# Patient Record
Sex: FEMALE | Race: WHITE | NOT HISPANIC OR LATINO | Employment: FULL TIME | URBAN - METROPOLITAN AREA
[De-identification: names, ages, dates, MRNs, and addresses within clinical notes are randomized per-mention and may not be internally consistent; named-entity substitution may affect disease eponyms.]

---

## 2017-05-12 ENCOUNTER — OFFICE VISIT (OUTPATIENT)
Dept: FAMILY MEDICINE | Facility: CLINIC | Age: 22
End: 2017-05-12
Payer: COMMERCIAL

## 2017-05-12 VITALS
SYSTOLIC BLOOD PRESSURE: 131 MMHG | DIASTOLIC BLOOD PRESSURE: 83 MMHG | HEIGHT: 67 IN | WEIGHT: 175.2 LBS | HEART RATE: 81 BPM | OXYGEN SATURATION: 100 % | TEMPERATURE: 97.9 F | BODY MASS INDEX: 27.5 KG/M2

## 2017-05-12 DIAGNOSIS — Z23 ENCOUNTER FOR IMMUNIZATION: ICD-10-CM

## 2017-05-12 DIAGNOSIS — Z30.011 ENCOUNTER FOR INITIAL PRESCRIPTION OF CONTRACEPTIVE PILLS: Primary | ICD-10-CM

## 2017-05-12 DIAGNOSIS — Z11.3 SCREEN FOR STD (SEXUALLY TRANSMITTED DISEASE): ICD-10-CM

## 2017-05-12 PROCEDURE — 87591 N.GONORRHOEAE DNA AMP PROB: CPT | Performed by: FAMILY MEDICINE

## 2017-05-12 PROCEDURE — 87491 CHLMYD TRACH DNA AMP PROBE: CPT | Performed by: FAMILY MEDICINE

## 2017-05-12 PROCEDURE — 90471 IMMUNIZATION ADMIN: CPT | Performed by: FAMILY MEDICINE

## 2017-05-12 PROCEDURE — 90649 4VHPV VACCINE 3 DOSE IM: CPT | Performed by: FAMILY MEDICINE

## 2017-05-12 PROCEDURE — 99203 OFFICE O/P NEW LOW 30 MIN: CPT | Mod: 25 | Performed by: FAMILY MEDICINE

## 2017-05-12 RX ORDER — LEVONORGESTREL/ETHIN.ESTRADIOL 0.1-0.02MG
1 TABLET ORAL DAILY
Qty: 84 TABLET | Refills: 4 | Status: SHIPPED | OUTPATIENT
Start: 2017-05-12 | End: 2018-04-23

## 2017-05-12 NOTE — MR AVS SNAPSHOT
After Visit Summary   5/12/2017    Blanche Lyman    MRN: [id number]           Patient Information     Date Of Birth          1995        Visit Information        Provider Department      5/12/2017 7:00 AM Wilder Whitfield MD [facility]        Today's Diagnoses     Encounter for initial prescription of contraceptive pills    -  1      Care Instructions    ORAL CONTRACEPTIVE START INSTRUCTION  If the first day of period is day 1 then start the birth control on day 5-7     Take pill by mouth daily at same time every day  Expect the period during blank Pill section  Start next packet on time, if late start use condom in addition that month  - should use  back up the pill with a condom during the first cycle.  - You  need  additional protection, such as a condom, to prevent exposure to sexually transmitted diseases  and HIV     MISSED PILL INSTRUCTION ( expect breakthrough bleeding)   - If miss one pill take it when you remember, if its the next day take 2 at once.   - If you miss 2 pills in a row - take 2 pills for the next 2 days and use a back up condom until the next  cycle    SIDE EFFECTS  - bleeding in between periods is normal for first 2 months - should decrease by the third month  - blood clots in the leg that can travel to heart, lungs or brain are rare.    smoking increases that risk.   - redness, heat, swelling in the legs may represent a blood clot so call immediately  - Hormones can cause mood changes, headaches, and stomach upset.  These usually go down after a few months but if they don't we can change the pill to a different hormone pill.    MEDICATION INTERACTION  - some medication can interfere with oral contraceptive pills such as antibiotics. Use condoms during the month on antibiotics or other prescription medication that may decrease oral contraceptive pill  effectiveness          Thank you for choosing [facility] ([facility]  You may be receiving a survey in the  "mail from Enriqueta Ocampo regarding your visit today.  Please take a few minutes to complete and return the survey to let us know how we are doing.      If you have questions or concerns, please contact us via Jeeran or you can contact your care team at [phone].    Our Clinic hours are:  Monday 6:40 am  to 7:00 pm  Tuesday -Friday 6:40 am to 5:00 pm    The [place] outpatient lab hours are:  Monday - Friday 6:10 am to 4:45 pm  Saturdays 7:00 am to 11:00 am  Appointments are required, call [phone]    If you have clinical questions after hours or would like to schedule an appointment,  call the clinic at [phone].        Follow-ups after your visit        Who to contact     If you have questions or need follow up information about today's clinic visit or your schedule please contact [facility] directly at [phone].  Normal or non-critical lab and imaging results will be communicated to you by CipherHealthhart, letter or phone within 4 business days after the clinic has received the results. If you do not hear from us within 7 days, please contact the clinic through Jeeran or phone. If you have a critical or abnormal lab result, we will notify you by phone as soon as possible.  Submit refill requests through Jeeran or call your pharmacy and they will forward the refill request to us. Please allow 3 business days for your refill to be completed.          Additional Information About Your Visit        Jeeran Information     Jeeran lets you send messages to your doctor, view your test results, renew your prescriptions, schedule appointments and more. To sign up, go to [email or web address] . Click on \"Log in\" on the left side of the screen, which will take you to the Welcome page. Then click on \"Sign up Now\" on the right side of the page.     You will be asked to enter the access code listed below, as well as some personal information. Please follow the directions to create your username and " "password.     Your access code is: [email or web address]  Expires: 8/10/2017  7:29 AM     Your access code will  in 90 days. If you need help or a new code, please call your [facility] or [phone].        Care EveryWhere ID     This is your Care EveryWhere ID. This could be used by other organizations to access your [facility] medical records  [id number]        Your Vitals Were     Pulse Temperature Height Last Period Pulse Oximetry BMI (Body Mass Index)    81 97.9  F (36.6  C) (Tympanic) 5' 6.5\" (1.689 m) 2017 (Exact Date) 100% 27.85 kg/m2       Blood Pressure from Last 3 Encounters:   17 131/83   11 143/77   11 135/84    Weight from Last 3 Encounters:   17 175 lb 3.2 oz (79.5 kg)   11 158 lb 12.8 oz (72 kg) (92 %)*   11 154 lb 6.4 oz (70 kg) (90 %)*     * Growth percentiles are based on [facility] 2-20 Years data.              Today, you had the following     No orders found for display         Today's Medication Changes          These changes are accurate as of: 17  7:29 AM.  If you have any questions, ask your nurse or doctor.               Start taking these medicines.        Dose/Directions    levonorgestrel-ethinyl estradiol 0.1-20 MG-MCG per tablet   Commonly known as:  AVIANE,DEIDRE,KIMBERLY   Used for:  Encounter for initial prescription of contraceptive pills   Started by:  Wilder Whitfield MD        Dose:  1 tablet   Take 1 tablet by mouth daily   Quantity:  84 tablet   Refills:  4            Where to get your medicines      These [facility] - [address]  [address], [place] [address]    Hours:  [place] [place] [facility] Phone:  [phone]     levonorgestrel-ethinyl estradiol 0.1-20 MG-MCG per tablet                Primary Care Provider Office Phone # Fax #    Mar Gretta Salcedo [phone]       [facility] [address] [facility] [address]        Thank you!     Thank you for choosing [facility]  for your care. Our goal is always to provide you with excellent care. Hearing back from our patients is one way we can continue to improve our services. Please take a few minutes to complete the written survey that you may receive in the mail after your visit with us. Thank you!             Your Updated Medication List - Protect others around you: Learn how to safely use, store and throw away your medicines at www.disposemymeds.org.          This list is accurate as of: 5/12/17  7:29 AM.  Always use your most recent med list.                   Brand Name Dispense Instructions for use    levonorgestrel-ethinyl estradiol 0.1-20 MG-MCG per tablet    AVIANE,ALESSE,LESSINA    84 tablet    Take 1 tablet by mouth daily       NO ACTIVE MEDICATIONS

## 2017-05-12 NOTE — NURSING NOTE
"Chief Complaint   Patient presents with     Contraception     wants to discuss options for birth control       Initial /83  Pulse 81  Temp 97.9  F (36.6  C) (Tympanic)  Ht 5' 6.5\" (1.689 m)  Wt 175 lb 3.2 oz (79.5 kg)  LMP 04/20/2017 (Exact Date)  SpO2 100%  BMI 27.85 kg/m2 Estimated body mass index is 27.85 kg/(m^2) as calculated from the following:    Height as of this encounter: 5' 6.5\" (1.689 m).    Weight as of this encounter: 175 lb 3.2 oz (79.5 kg).  Medication Reconciliation: complete    Screening Questionnaire for Adult Immunization    Are you sick today?   No   Do you have allergies to medications, food, a vaccine component or latex?   No   Have you ever had a serious reaction after receiving a vaccination?   No   Do you have a long-term health problem with heart disease, lung disease, asthma, kidney disease, metabolic disease (e.g. diabetes), anemia, or other blood disorder?   No   Do you have cancer, leukemia, HIV/AIDS, or any other immune system problem?   No   In the past 3 months, have you taken medications that affect  your immune system, such as prednisone, other steroids, or anticancer drugs; drugs for the treatment of rheumatoid arthritis, Crohn s disease, or psoriasis; or have you had radiation treatments?   No   Have you had a seizure, or a brain or other nervous system problem?   No   During the past year, have you received a transfusion of blood or blood     products, or been given immune (gamma) globulin or antiviral drug?   No   For women: Are you pregnant or is there a chance you could become        pregnant during the next month?   No   Have you received any vaccinations in the past 4 weeks?   No     Immunization questionnaire answers were all negative.      MNVFC doesn't apply on this patient    Per orders of Dr. Wilder Whitfield, injection of Gardasil given by Angela Milner. Patient instructed to remain in clinic for 20 minutes afterwards, and to report any adverse reaction to me " immediately.       Screening performed by Angela Milner on 5/12/2017 at 7:08 AM.

## 2017-05-12 NOTE — PATIENT INSTRUCTIONS
ORAL CONTRACEPTIVE START INSTRUCTION  If the first day of period is day 1 then start the birth control on day 5-7     Take pill by mouth daily at same time every day  Expect the period during blank Pill section  Start next packet on time, if late start use condom in addition that month  - should use  back up the pill with a condom during the first cycle.  - You  need  additional protection, such as a condom, to prevent exposure to sexually transmitted diseases  and HIV     MISSED PILL INSTRUCTION ( expect breakthrough bleeding)   - If miss one pill take it when you remember, if its the next day take 2 at once.   - If you miss 2 pills in a row - take 2 pills for the next 2 days and use a back up condom until the next  cycle    SIDE EFFECTS  - bleeding in between periods is normal for first 2 months - should decrease by the third month  - blood clots in the leg that can travel to heart, lungs or brain are rare.    smoking increases that risk.   - redness, heat, swelling in the legs may represent a blood clot so call immediately  - Hormones can cause mood changes, headaches, and stomach upset.  These usually go down after a few months but if they don't we can change the pill to a different hormone pill.    MEDICATION INTERACTION  - some medication can interfere with oral contraceptive pills such as antibiotics. Use condoms during the month on antibiotics or other prescription medication that may decrease oral contraceptive pill  effectiveness          Thank you for choosing [facility].  You may be receiving a survey in the mail from Enriqueta Ocampo regarding your visit today.  Please take a few minutes to complete and return the survey to let us know how we are doing.      If you have questions or concerns, please contact us via Joroto or you can contact your care team at [phone].    Our Clinic hours are:  Monday 6:40 am  to 7:00 pm  Tuesday -Friday 6:40 am to 5:00 pm    The [place] outpatient lab hours  are:  Monday - Friday 6:10 am to 4:45 pm  Saturdays 7:00 am to 11:00 am  Appointments are required, call [phone]    If you have clinical questions after hours or would like to schedule an appointment,  call the clinic at [phone].

## 2017-05-12 NOTE — PROGRESS NOTES
"  SUBJECTIVE:                                                    Blanche Lyman is a 22 year old female who presents to clinic today for the following health issues:      Chief Complaint   Patient presents with     Contraception     wants to discuss options for birth control     Blanche Lyman is a 22 year old  female   Here for contraception.  Periods are regular occuring every 28 days, takes IBP for cramping pain  Last Menstrual Period Patient's last menstrual period was 04/20/2017 (exact date).  Is sexually active, with male partner.  Has used Plan B in the past.  Denies hx of migraine, seizure, liver disease, high blood pressure or blood clots. No aura with migraine.  Non Smoker.    Denies fam hx of cancers or blood clots.         Review of Systems:   RESP: no significant cough, no shortness of breath  CV: no chest pain, no palpitations, no new or worsening peripheral edema  GI: no nausea, no vomiting, no constipation, no diarrhea  : no frequency, no dysuria, no hematuria, no vaginal discharge or itching  CONSTITUTIONAL: no fever, no chills,   SKIN: no worrisome rashes, no worrisome lesions          Physical Exam:     Vitals:    05/12/17 0704   BP: 131/83   Pulse: 81   Temp: 97.9  F (36.6  C)   TempSrc: Tympanic   SpO2: 100%   Weight: 175 lb 3.2 oz (79.5 kg)   Height: 5' 6.5\" (1.689 m)     Body mass index is 27.85 kg/(m^2).  GENERAL:: healthy, alert and no distress  PSYCH: Alert and oriented times 3; speech- coherent , normal rate and volume; able to articulate logical thoughts, able to abstract reason, no tangential thoughts, no hallucinations or delusions, affect- normal  No results found for this or any previous visit (from the past 24 hour(s)).  Assessment and Plan   Blanche was seen today for contraception and imm/inj.    Diagnoses and all orders for this visit:    Encounter for initial prescription of contraceptive pills  --discussed risks, benefits, and side effects of this new medication. Patient " understands and is in agreement.    -     levonorgestrel-ethinyl estradiol (AVIANE,DEIDRE,LESSINA) 0.1-20 MG-MCG per tablet; Take 1 tablet by mouth daily    Screen for STD (sexually transmitted disease)  -     Chlamydia trachomatis PCR  -     Neisseria gonorrhoeae PCR    Encounter for immunization  -     HUMAN PAPILLOMA VIRUS VACCINE  -     ADMIN 1st VACCINE      Patient interested in birth control.  Reviewed risks, benefits, of choices including abstinence, OCP, Patch, Nuvaring, Depo-Provera, IUD, and condoms.  Reviewed need for back-up contraception for the first month of hormonal methods. Reviewed that only abstinence and condoms provide protection from STD's.    Options for treatment and follow-up care were reviewed with the patient and/or guardian. Blanche Lyman and/or guardian engaged in the decision making process and verbalized understanding of the options discussed and agreed with the final plan.  Return to clinic for physical for PAP, offered today and patient declined.  Wilder Whitfield MD  [facility]

## 2017-05-14 LAB
C TRACH DNA SPEC QL NAA+PROBE: NORMAL
N GONORRHOEA DNA SPEC QL NAA+PROBE: NORMAL
SPECIMEN SOURCE: NORMAL
SPECIMEN SOURCE: NORMAL

## 2017-10-29 ENCOUNTER — HEALTH MAINTENANCE LETTER (OUTPATIENT)
Age: 22
End: 2017-10-29

## 2018-03-04 ENCOUNTER — HEALTH MAINTENANCE LETTER (OUTPATIENT)
Age: 23
End: 2018-03-04

## 2018-04-23 DIAGNOSIS — Z30.011 ENCOUNTER FOR INITIAL PRESCRIPTION OF CONTRACEPTIVE PILLS: ICD-10-CM

## 2018-04-23 NOTE — TELEPHONE ENCOUNTER
"Requested Prescriptions   Pending Prescriptions Disp Refills     levonorgestrel-ethinyl estradiol (AVIANE,DEIDRE,LESSINA) 0.1-20 MG-MCG per tablet  Last Written Prescription Date:  05/12/17  Last Fill Quantity: 84,  # refills: 4   Last office visit: 5/12/2017 with prescribing provider:  05/12/17   Future Office Visit:     84 tablet 4     Sig: Take 1 tablet by mouth daily    Contraceptives Protocol Passed    4/23/2018  2:37 PM       Passed - Patient is not a current smoker if age is 35 or older       Passed - Recent (12 mo) or future (30 days) visit within the authorizing provider's specialty    Patient had office visit in the last 12 months or has a visit in the next 30 days with authorizing provider or within the authorizing provider's specialty.  See \"Patient Info\" tab in inbasket, or \"Choose Columns\" in Meds & Orders section of the refill encounter.         Passed - No active pregnancy on record       Passed - No positive pregnancy test in past 12 months          "

## 2018-04-25 RX ORDER — LEVONORGESTREL/ETHIN.ESTRADIOL 0.1-0.02MG
1 TABLET ORAL DAILY
Qty: 84 TABLET | Refills: 0 | Status: SHIPPED | OUTPATIENT
Start: 2018-04-25 | End: 2018-07-06

## 2018-04-25 NOTE — TELEPHONE ENCOUNTER
Prescription approved per [facility] Refill Protocol.    Lilliana ARMENTA RN

## 2018-05-27 ENCOUNTER — APPOINTMENT (OUTPATIENT)
Dept: GENERAL RADIOLOGY | Facility: CLINIC | Age: 23
End: 2018-05-27
Attending: NURSE PRACTITIONER
Payer: COMMERCIAL

## 2018-05-27 ENCOUNTER — HOSPITAL ENCOUNTER (EMERGENCY)
Facility: CLINIC | Age: 23
Discharge: HOME | End: 2018-05-27
Attending: NURSE PRACTITIONER | Admitting: NURSE PRACTITIONER
Payer: COMMERCIAL

## 2018-05-27 VITALS
WEIGHT: 163 LBS | OXYGEN SATURATION: 100 % | HEART RATE: 84 BPM | TEMPERATURE: 98.8 F | DIASTOLIC BLOOD PRESSURE: 102 MMHG | SYSTOLIC BLOOD PRESSURE: 154 MMHG | BODY MASS INDEX: 25.91 KG/M2

## 2018-05-27 DIAGNOSIS — S60.112A CONTUSION OF LEFT THUMB WITH DAMAGE TO NAIL, INITIAL ENCOUNTER: Primary | ICD-10-CM

## 2018-05-27 PROCEDURE — 73140 X-RAY EXAM OF FINGER(S): CPT | Mod: LT

## 2018-05-27 PROCEDURE — G0463 HOSPITAL OUTPT CLINIC VISIT: HCPCS | Mod: 25 | Performed by: NURSE PRACTITIONER

## 2018-05-27 PROCEDURE — 29130 APPL FINGER SPLINT STATIC: CPT | Mod: FA | Performed by: NURSE PRACTITIONER

## 2018-05-27 PROCEDURE — 99214 OFFICE O/P EST MOD 30 MIN: CPT | Mod: Z6 | Performed by: NURSE PRACTITIONER

## 2018-05-27 ASSESSMENT — ENCOUNTER SYMPTOMS
ABDOMINAL PAIN: 0
SORE THROAT: 0
VOMITING: 0
SHORTNESS OF BREATH: 0
NAUSEA: 0
CHILLS: 0
DIFFICULTY URINATING: 0
WOUND: 1
FEVER: 0
SEIZURES: 0
CONSTIPATION: 0
EYE PAIN: 0
DIARRHEA: 0
FATIGUE: 0
COUGH: 0
DYSURIA: 0

## 2018-05-27 NOTE — ED AVS SNAPSHOT
[facility] Emergency Department    [address]    [facility] [address]    Phone:  [phone]    Fax:  [phone]                                       Blanche Lyman   MRN: [id number]    Department:  [facility] Emergency Department   Date of Visit:  5/27/2018           Patient Information     Date Of Birth          1995        Your diagnoses for this visit were:     Contusion of left thumb with damage to nail, initial encounter        You were seen by Leida Falcon APRN CNP.      Follow-up Information     Follow up with Mar Salcedo MD In 1 week.    Specialty:  Family Practice    Why:  If symptoms worsen, As needed    Contact information:    [facility]  [address] [address]  [phone]          Discharge Instructions         Finger Contusion  You have a contusion. This is also called a bruise. There is swelling and some bleeding under the skin, but no broken bones. This injury generally takes a few days to a few weeks to heal. During that time, the bruise will typically change in color from reddish, to purple-blue, to greenish-yellow, then to yellow-brown.  A finger contusion may be treated with a splint or buddy tape (taping the injured finger to the one next to it for support). Minor contusions likely will need no other treatment.  Home care    Elevate the hand to reduce pain and swelling. As much as possible, sit or lie down with the hand raised about the level of your heart. This is especially important during the first 48 hours.    Ice the finger to help reduce pain and swelling. Wrap a cold source (ice pack or ice cubes in a plastic bag) in a thin towel. Apply to the bruised finger for 20 minutes every 1 to 2 hours the first day. Continue this 3 to 4 times a day until the pain and swelling goes away.    If buddy tape was applied and it becomes wet or dirty, change it. You may replace it with paper, plastic, or cloth  tape. Before taping, put a thin strip of cotton or gauze between the fingers to absorb sweat. This will help prevent any breakdown of skin or fungal infections.     Unless another medicine was prescribed, you can take acetaminophen, ibuprofen, or naproxen to control pain. (If you have chronic liver or kidney disease or ever had a stomach ulcer or gastrointestinal bleeding, talk with your doctor before using these medicines.)  Follow up  Follow up with your healthcare provider, or as advised. Call if you are not improving within 1 to 2 weeks.  When to seek medical advice   Call your healthcare provider right away if you have any of the following:    Increased pain or swelling    Hand or arm becomes cold, blue, numb or tingly    Signs of infection: Warmth, drainage, or increased redness or pain around the bruise    Inability to move the injured finger or hand     Frequent bruising for unknown reasons  Date Last Reviewed: 5/1/2017 2000-2017 [employer]. [address] [place] [address]. All rights reserved. This information is not intended as a substitute for professional medical care. Always follow your healthcare professional's instructions.          24 Hour Appointment Hotline       To make an appointment at any [facility], call [phone] ([phone]). If you don't have a family doctor or clinic, we will help you find one. [place] clinics are conveniently located to serve the needs of you and your family.             Review of your medicines      Our records show that you are taking the medicines listed below. If these are incorrect, please call your family doctor or clinic.        Dose / Directions Last dose taken    levonorgestrel-ethinyl estradiol 0.1-20 MG-MCG per tablet   Commonly known as:  DEIDRE ESPARZA LESSINA   Dose:  1 tablet   Quantity:  84 tablet        Take 1 tablet by mouth daily   Refills:  0                Procedures and tests performed during your visit      "Fingers XR, 2-3 views, left      Orders Needing Specimen Collection     None      Pending Results     Date and Time Order Name Status Description    2018 1303 Fingers XR, 2-3 views, left Preliminary             Pending Culture Results     No orders found from 2018 to 2018.            Pending Results Instructions     If you had any lab results that were not finalized at the time of your Discharge, you can call the ED Lab Result RN at [phone]. You will be contacted by this team for any positive Lab results or changes in treatment. The nurses are available 7 days a week from 10A to 6:30P.  You can leave a message 24 hours per day and they will return your call.        Test Results From Your Hospital Stay        2018  1:26 PM      Narrative     LEFT FINGER TWO OR MORE VIEWS   2018 1:13 PM     HISTORY: Smashed thumb 4 days ago.     COMPARISON: None.        Impression     IMPRESSION: Normal.                Thank you for choosing [facility]       Thank you for choosing [facility] for your care. Our goal is always to provide you with excellent care. Hearing back from our patients is one way we can continue to improve our services. Please take a few minutes to complete the written survey that you may receive in the mail after you visit with us. Thank you!        NoiseFreehart Information     [email or web address] lets you send messages to your doctor, view your test results, renew your prescriptions, schedule appointments and more. To sign up, go to [email or web address] . Click on \"Log in\" on the left side of the screen, which will take you to the Welcome page. Then click on \"Sign up Now\" on the right side of the page.     You will be asked to enter the access code listed below, as well as some personal information. Please follow the directions to create your username and password.     Your access code is: [email or web address]  Expires: 2018  1:33 PM     Your access code will  in 90 days. If you need help or a " new code, please call your [place] clinic or [phone].        Care EveryWhere ID     This is your Care EveryWhere ID. This could be used by other organizations to access your [facility] medical records  [id number]        Equal Access to Services     YONI HARMAN : Blade colon Sostevenson, waaxda luqadaha, qaybta kaalmada santa, idris velasquez. So [facility] [phone].    ATENCIÓN: Si habla español, tiene a lobato disposición servicios gratuitos de asistencia lingüística. Llame al [phone].    We comply with applicable federal civil rights laws and [place] laws. We do not discriminate on the basis of race, color, national origin, age, disability, sex, sexual orientation, or gender identity.            After Visit Summary       This is your record. Keep this with you and show to your community pharmacist(s) and doctor(s) at your next visit.

## 2018-05-27 NOTE — DISCHARGE INSTRUCTIONS
Finger Contusion  You have a contusion. This is also called a bruise. There is swelling and some bleeding under the skin, but no broken bones. This injury generally takes a few days to a few weeks to heal. During that time, the bruise will typically change in color from reddish, to purple-blue, to greenish-yellow, then to yellow-brown.  A finger contusion may be treated with a splint or pratibha tape (taping the injured finger to the one next to it for support). Minor contusions likely will need no other treatment.  Home care    Elevate the hand to reduce pain and swelling. As much as possible, sit or lie down with the hand raised about the level of your heart. This is especially important during the first 48 hours.    Ice the finger to help reduce pain and swelling. Wrap a cold source (ice pack or ice cubes in a plastic bag) in a thin towel. Apply to the bruised finger for 20 minutes every 1 to 2 hours the first day. Continue this 3 to 4 times a day until the pain and swelling goes away.    If pratibha tape was applied and it becomes wet or dirty, change it. You may replace it with paper, plastic, or cloth tape. Before taping, put a thin strip of cotton or gauze between the fingers to absorb sweat. This will help prevent any breakdown of skin or fungal infections.     Unless another medicine was prescribed, you can take acetaminophen, ibuprofen, or naproxen to control pain. (If you have chronic liver or kidney disease or ever had a stomach ulcer or gastrointestinal bleeding, talk with your doctor before using these medicines.)  Follow up  Follow up with your healthcare provider, or as advised. Call if you are not improving within 1 to 2 weeks.  When to seek medical advice   Call your healthcare provider right away if you have any of the following:    Increased pain or swelling    Hand or arm becomes cold, blue, numb or tingly    Signs of infection: Warmth, drainage, or increased redness or pain around the  bruise    Inability to move the injured finger or hand     Frequent bruising for unknown reasons  Date Last Reviewed: 5/1/2017 2000-2017 [employer]. [address] [place] [address], [place] [address]. All rights reserved. This information is not intended as a substitute for professional medical care. Always follow your healthcare professional's instructions.

## 2018-05-27 NOTE — ED AVS SNAPSHOT
[facility] Emergency Department    [address] [address]    Phone:  [phone]    Fax:  [phone]                                       Blanche Lyman   MRN: [id number]    Department:  [facility] Emergency Department   Date of Visit:  5/27/2018           After Visit Summary Signature Page     I have received my discharge instructions, and my questions have been answered. I have discussed any challenges I see with this plan with the nurse or doctor.    ..........................................................................................................................................  Patient/Patient Representative Signature      ..........................................................................................................................................  Patient Representative Print Name and Relationship to Patient    ..................................................               ................................................  Date                                            Time    ..........................................................................................................................................  Reviewed by Signature/Title    ...................................................              ..............................................  Date                                                            Time

## 2018-06-27 ENCOUNTER — HOSPITAL ENCOUNTER (EMERGENCY)
Facility: CLINIC | Age: 23
Discharge: HOME | End: 2018-06-27
Attending: PHYSICIAN ASSISTANT | Admitting: PHYSICIAN ASSISTANT
Payer: COMMERCIAL

## 2018-06-27 VITALS
OXYGEN SATURATION: 99 % | RESPIRATION RATE: 16 BRPM | SYSTOLIC BLOOD PRESSURE: 140 MMHG | DIASTOLIC BLOOD PRESSURE: 88 MMHG | TEMPERATURE: 98.7 F

## 2018-06-27 DIAGNOSIS — M72.2 PLANTAR FASCIITIS: ICD-10-CM

## 2018-06-27 PROCEDURE — G0463 HOSPITAL OUTPT CLINIC VISIT: HCPCS

## 2018-06-27 PROCEDURE — 99213 OFFICE O/P EST LOW 20 MIN: CPT | Performed by: PHYSICIAN ASSISTANT

## 2018-06-27 NOTE — ED AVS SNAPSHOT
[facility] Emergency Department    [address] [address]    Phone:  [phone]    Fax:  [phone]                                       Blanche Lyman   MRN: [id number]    Department:  [facility] Emergency Department   Date of Visit:  6/27/2018           After Visit Summary Signature Page     I have received my discharge instructions, and my questions have been answered. I have discussed any challenges I see with this plan with the nurse or doctor.    ..........................................................................................................................................  Patient/Patient Representative Signature      ..........................................................................................................................................  Patient Representative Print Name and Relationship to Patient    ..................................................               ................................................  Date                                            Time    ..........................................................................................................................................  Reviewed by Signature/Title    ...................................................              ..............................................  Date                                                            Time

## 2018-06-27 NOTE — ED PROVIDER NOTES
History     Chief Complaint   Patient presents with     Foreign Body in Skin     sense Sunday rt foot      HPI  Blanche Lyman is a 23 year old female who presents to urgent care with concern over possible foreign body in her right foot.  Beginning Monday patient began complaining of pain in her right heel.  Pain is worse upon waking and intravaginally improved throughout the day. However has been present for the last three days.  She denies remembering stepping on anything however believes that there could have been something on the day preceding onset of symptoms.  Her boyfriend states concern that he was able to see something with a magnifying glass in the area.  She has not had any surrounding erythema.  No distal numbness or paresthesias. She has not attempted any OTC treatments.  She denies any recent changes in footwear.  No new changes in activity level or new exercise programs.      Problem List:    There are no active problems to display for this patient.       Past Medical History:    History reviewed. No pertinent past medical history.    Past Surgical History:    History reviewed. No pertinent surgical history.    Family History:    Family History   Problem Relation Age of Onset     HEART DISEASE Maternal Grandfather      Cancer Paternal Grandfather      HEART DISEASE Paternal Grandfather      Social History:  Marital Status:  Single [1]  Social History   Substance Use Topics     Smoking status: Never Smoker     Smokeless tobacco: Never Used     Alcohol use Not on file      Medications:      levonorgestrel-ethinyl estradiol (AVIANE,ALESSE,LESSINA) 0.1-20 MG-MCG per tablet     Review of Systems  CONSTITUTIONAL:NEGATIVE for fever, chills, change in weight  INTEGUMENTARY/SKIN: NEGATIVE for worrisome rashes, moles or lesions  RESP:NEGATIVE for significant cough or SOB  MUSCULOSKELETAL: POSITIVE  for right foot pain and NEGATIVE for other significant arthralgias or myalgias   NEURO: NEGATIVE for numbness  or paresthesias  Physical Exam   BP: 140/88  Heart Rate: 78  Temp: 98.7  F (37.1  C)  Resp: 16  SpO2: 99 %  Physical Exam   Constitutional: She is oriented to person, place, and time. She appears well-developed and well-nourished. No distress.   Cardiovascular:   Pulses:       Dorsalis pedis pulses are 2+ on the right side        Posterior tibial pulses are 2+ on the right side   Musculoskeletal:        Right ankle: Normal.        Right foot: There is tenderness (right heel and medial arch, pain exacerbated by dorsiflexion of foot). There is normal range of motion, no swelling, normal capillary refill, no crepitus, no deformity and no laceration.   Neurological: She is alert and oriented to person, place, and time. No sensory deficit.   Skin: Skin is warm and dry. No erythema.   No visible puncture wound in patients are of pain      ED Course     ED Course     Procedures        Critical Care time:  none          No results found for this or any previous visit (from the past 24 hour(s)).  Medications - No data to display  Assessments & Plan (with Medical Decision Making)     I have reviewed the nursing notes.    I have reviewed the findings, diagnosis, plan and need for follow up with the patient.       New Prescriptions    No medications on file     Final diagnoses:   Plantar fasciitis     23 year old female presents to  with concern over suspected foreign body after developing right heel pain without known injury.  She had stable vital signs upon arrival.  Physical exam findings as described above were classic for plantar fasciitis. There was no evidence of recent puncture wound or foreign body.  Symptomatic treatment including rest, ice, stretching discussed with patient.  follow up with PCP if no improvement in 5-7 days. Worrisome reasons to return to ER/UC sooner discussed.     Disclaimer: This note consists of symbols derived from keyboarding, dictation, and/or voice recognition software. As a result, there  may be errors in the script that have gone undetected.  Please consider this when interpreting information found in the chart.         6/27/2018   [facility] EMERGENCY DEPARTMENT     Whitney Weinberg PA-C  07/01/18 1935

## 2018-06-27 NOTE — ED AVS SNAPSHOT
[facility] Emergency Department    [address] [address]    Phone:  [phone]    Fax:  [phone]                                       Blanche Lyman   MRN: [id number]    Department:  [facility] Emergency Department   Date of Visit:  6/27/2018           Patient Information     Date Of Birth          1995        Your diagnoses for this visit were:     Plantar fasciitis        You were seen by Whitney Weinberg PA-C.      Follow-up Information     Follow up with Wilder Whitfield MD In 7 days.    Specialty:  Family Practice    Why:  if no improvement or sooner if new or worsening symptoms     Contact information:    [address] [address]  [phone]        Discharge References/Attachments     PLANTAR FASCIITIS (ENGLISH)      Your next 10 appointments already scheduled     Jul 06, 2018 11:00 AM CDT   PHYSICAL with Wilder Whitfield MD   [facility]    [address] [address] [phone]              24 Hour Appointment Hotline       To make an appointment at any [facility], call [phone] ([phone]). If you don't have a family doctor or clinic, we will help you find one. [facility] are conveniently located to serve the needs of you and your family.             Review of your medicines      Our records show that you are taking the medicines listed below. If these are incorrect, please call your family doctor or clinic.        Dose / Directions Last dose taken    levonorgestrel-ethinyl estradiol 0.1-20 MG-MCG per tablet   Commonly known as:  DEIDRE ESPARZA LESSINA   Dose:  1 tablet   Quantity:  84 tablet        Take 1 tablet by mouth daily   Refills:  0                Orders Needing Specimen Collection     None      Pending Results     No orders found from 6/25/2018 to 6/28/2018.            Pending Culture Results     No orders found from 6/25/2018 to 6/28/2018.           "  Pending Results Instructions     If you had any lab results that were not finalized at the time of your Discharge, you can call the ED Lab Result RN at [phone]. You will be contacted by this team for any positive Lab results or changes in treatment. The nurses are available 7 days a week from 10A to 6:30P.  You can leave a message 24 hours per day and they will return your call.        Test Results From Your Hospital Stay               Thank you for choosing [facility]       Thank you for choosing [facility] for your care. Our goal is always to provide you with excellent care. Hearing back from our patients is one way we can continue to improve our services. Please take a few minutes to complete the written survey that you may receive in the mail after you visit with us. Thank you!        Local [employer] Information     [employer] lets you send messages to your doctor, view your test results, renew your prescriptions, schedule appointments and more. To sign up, go to [email or web address] . Click on \"Log in\" on the left side of the screen, which will take you to the Welcome page. Then click on \"Sign up Now\" on the right side of the page.     You will be asked to enter the access code listed below, as well as some personal information. Please follow the directions to create your username and password.     Your access code is: [email or web address]  Expires: 2018  1:33 PM     Your access code will  in 90 days. If you need help or a new code, please call your [facility] clinic or [phone].        Care EveryWhere ID     This is your Care EveryWhere ID. This could be used by other organizations to access your [facility] medical records  [id number]        Equal Access to Services     [facility] : Blade Godfrey, efren carrasco, idris bai. So [facility] [phone].    ATENCIÓN: Si habla español, tiene a lobato disposición servicios gratuitos de asistencia " roberto Rodriguezriver al [phone].    We comply with applicable federal civil rights laws and [place] laws. We do not discriminate on the basis of race, color, national origin, age, disability, sex, sexual orientation, or gender identity.            After Visit Summary       This is your record. Keep this with you and show to your community pharmacist(s) and doctor(s) at your next visit.

## 2018-07-06 ENCOUNTER — OFFICE VISIT (OUTPATIENT)
Dept: FAMILY MEDICINE | Facility: CLINIC | Age: 23
End: 2018-07-06
Payer: COMMERCIAL

## 2018-07-06 VITALS
BODY MASS INDEX: 33.06 KG/M2 | SYSTOLIC BLOOD PRESSURE: 128 MMHG | WEIGHT: 210.6 LBS | TEMPERATURE: 98.4 F | HEART RATE: 73 BPM | HEIGHT: 67 IN | DIASTOLIC BLOOD PRESSURE: 78 MMHG | OXYGEN SATURATION: 100 %

## 2018-07-06 DIAGNOSIS — Z11.3 SCREEN FOR STD (SEXUALLY TRANSMITTED DISEASE): ICD-10-CM

## 2018-07-06 DIAGNOSIS — Z30.011 ENCOUNTER FOR INITIAL PRESCRIPTION OF CONTRACEPTIVE PILLS: ICD-10-CM

## 2018-07-06 DIAGNOSIS — Z01.419 WELL WOMAN EXAM WITH ROUTINE GYNECOLOGICAL EXAM: Primary | ICD-10-CM

## 2018-07-06 DIAGNOSIS — Z23 ENCOUNTER FOR IMMUNIZATION: ICD-10-CM

## 2018-07-06 PROCEDURE — 90471 IMMUNIZATION ADMIN: CPT | Performed by: FAMILY MEDICINE

## 2018-07-06 PROCEDURE — 90472 IMMUNIZATION ADMIN EACH ADD: CPT | Performed by: FAMILY MEDICINE

## 2018-07-06 PROCEDURE — 87491 CHLMYD TRACH DNA AMP PROBE: CPT | Performed by: FAMILY MEDICINE

## 2018-07-06 PROCEDURE — 90714 TD VACC NO PRESV 7 YRS+ IM: CPT | Performed by: FAMILY MEDICINE

## 2018-07-06 PROCEDURE — 90651 9VHPV VACCINE 2/3 DOSE IM: CPT | Performed by: FAMILY MEDICINE

## 2018-07-06 PROCEDURE — 99395 PREV VISIT EST AGE 18-39: CPT | Performed by: FAMILY MEDICINE

## 2018-07-06 PROCEDURE — G0145 SCR C/V CYTO,THINLAYER,RESCR: HCPCS | Performed by: FAMILY MEDICINE

## 2018-07-06 RX ORDER — LEVONORGESTREL/ETHIN.ESTRADIOL 0.1-0.02MG
1 TABLET ORAL DAILY
Qty: 84 TABLET | Refills: 4 | Status: SHIPPED | OUTPATIENT
Start: 2018-07-06 | End: 2019-08-28

## 2018-07-06 NOTE — MR AVS SNAPSHOT
After Visit Summary   7/6/2018    Blanche Lyman    MRN: [id number]           Patient Information     Date Of Birth          1995        Visit Information        Provider Department      7/6/2018 11:00 AM Wilder Whitfield MD [facility]        Today's Diagnoses     Well woman exam with routine gynecological exam    -  1    Encounter for initial prescription of contraceptive pills        Screen for STD (sexually transmitted disease)          Care Instructions    Keep up the exercise! Good for you for starting this.  One pound is 3500 calories.  So to lose one pound per week need to cut out 500 calories per day or 3500 calories per week.  Do not count exercise toward or against your calories.  Write down everything that you eat and count calories or use an nory like [employer] or MyFitness Pal or website like [employer] or [employer] or [employer]    1500 Calorie Menu: This is a well balanced healthy 1500 calorie menu so you can follow it for as long as you need!    Day 1    Breakfast - 2 whole grain toast, 1 tablespoon of jelly, 1 teaspoon of butter, 1 cup of tea or coffee,   cup of orange juice.  Snack -   bagel, 1 cup of yogurt.  Lunch - 1 oz of sliced turkey or chicken breast, 1 tossed vegetable salad with 1 tablespoon of olive oil and lemon juice, 1 whole grain roll.  Snack -   cup of fresh strawberries, 1 cup of yogurt, 1 tablespoon of granola cereal.  Dinner - 3 oz of beef, grilled or broiled, 1 cup of rice, 1 teaspoon of butter,   cup of steamed carrots, mixed green salad with olive oil and lemon juice.  Snack - 1 apple or orange.    Day 2    Breakfast - 1 orange, 1 cup of whole grain cereal, 1 cup of milk, 1 cup of strawberries.  Snack - 2 teaspoons of peanut butter, 2 rice cakes.  Lunch - 1 cup of vegetable soup, 1 oz of mozzarella cheese, 1 mixed greens salad with olive oil and lemon juice, 1 cup of yogurt, few whole grain crackers.  Snack - 1 apple.  Dinner - 5 oz of white  fish, baked, broiled or grilled, 1 baked potato, 1 cup of steamed broccoli, mixed greens salad with oil and lemon juice, 1 whole grain roll.  Snack - 3 cups of plain popcorn.    Day 3    Breakfast - 2 pancakes with 1 tablespoon of maple syrup or fruit spread.  Snack - 1 cup of milk, 1 peach.  Lunch - 6 oz of fish, grilled or broiled, mixed greens salad with 1 tablespoon of olive oil and lemon juice, 1 apple, and few whole grain crackers.  Snack - 1 granola bar.  Dinner - 2 cup of whole grain pasta,   cup of tomato sauce, mixed greens salad with olive oil and lemon juice.  Snack - 1 cup of milk, few peanuts.    Day 4    Breakfast -   cup of oatmeal, cooked with 1 teaspoons of brown sugar, 1 cup of milk, 1 orange.  Snack - 1 apple, 2 oz of almonds.  Lunch - 1 oz of sliced chicken or turkey breast, 1 teaspoon of mustard, 1 slices of whole wheat bread, 2 slices of tomato, 1   cup of sliced raw vegetables.  Snack -   cup of milk, 1 cup of strawberries.  Dinner - 3 oz skinless chicken breast, baked, broiled or grilled, 1 medium baked potato, 2 teaspoons of butter, mixed greens salad with 1 tablespoon of olive oil and lemon juice.  Snack - 1/2 cup of cottage cheese, 1 pear.    Day 5    Breakfast - 1 whole wheat bagel, 1 table spoon of cream cheese, 1 cup of orange juice.  Snack - 1 cup of yogurt, 1 apple.  Lunch - 2 oz lean hamburger, grilled or broiled, 1 cup of steamed asparagus, large tossed vegetable salad with yogurt dressing,   cup of cottage cheese.  Dinner - 2 cup of pasta with 3 oz of cooked shrimp and 1 /2 cup of broccoli, 1 slice of Italian bread, 1 teaspoon of garlic olive oil, mixed greens salad with oil and vinegar.    Day 6    Breakfast - 1 poached egg, 1 tomato, 1 whole wheat muffin,   grapefruit.  Snack - 1 cup of fruit salad, 1 cup of yogurt, 1 granola bar.  Lunch - 3 oz of sliced turkey or chicken breast, 1 yosef bread, 1 cup of sliced carrots and celery.  Snack - 1 peach,   cup of cottage cheese.  Dinner  - 3 oz of cheese, few crackers, 1 mixed greens salad with olive oil and lemon juice, 1 glass of dry red wine.  Snack - 1 cup of fresh strawberries.    Day 7    Breakfast - 1 whole wheat toast, 1 hard boiled egg,   cup of blueberries, 1 cup of yogurt.  Snack - 1 pear, 1 oz of pretzels.  Lunch - 4 oz of cheese, 1 large vegetable salad with olive oil and lemon juice, 1 cup of milk.  Snack -   cup of fruit salad, 1 granola bar.  Dinner - 3 oz of broiled or baked white fish, 1   cup of rice, 1 cup of steamed vegetables, 2 teaspoons of butter.            Thank you for choosing [facility].  You may be receiving a survey in the mail from Enriqueta Ocampo regarding your visit today.  Please take a few minutes to complete and return the survey to let us know how we are doing.      If you have questions or concerns, please contact us via [employer] or you can contact your care team at [phone].    Our Clinic hours are:  Monday 6:40 am  to 7:00 pm  Tuesday -Friday 6:40 am to 5:00 pm    The [place] outpatient lab hours are:  Monday - Friday 6:10 am to 4:45 pm  Saturdays 7:00 am to 11:00 am  Appointments are required, call [phone]    If you have clinical questions after hours or would like to schedule an appointment,  call the clinic at [phone].    Preventive Health Recommendations  Female Ages 21 to 25     Yearly exam:     See your health care provider every year in order to  o Review health changes.   o Discuss preventive care.    o Review your medicines if your doctor has prescribed any.      You should be tested each year for STDs (sexually transmitted diseases).       Talk to your provider about how often you should have cholesterol testing.      Get a Pap test every three years. If you have an abnormal result, your doctor may have you test more often.      If you are at risk for diabetes, you should have a diabetes test (fasting glucose).     Shots:     Get a flu shot each year.     Get a tetanus shot every 10  "years.     Consider getting the shot (vaccine) that prevents cervical cancer (Gardasil).    Nutrition:     Eat at least 5 servings of fruits and vegetables each day.    Eat whole-grain bread, whole-wheat pasta and brown rice instead of white grains and rice.    Get adequate Calcium and Vitamin D.     Lifestyle    Exercise at least 150 minutes a week each week (30 minutes a day, 5 days a week). This will help you control your weight and prevent disease.    Limit alcohol to one drink per day.    No smoking.     Wear sunscreen to prevent skin cancer.    See your dentist every six months for an exam and cleaning.          Follow-ups after your visit        Who to contact     If you have questions or need follow up information about today's clinic visit or your schedule please contact [facility] directly at [phone].  Normal or non-critical lab and imaging results will be communicated to you by MyChart, letter or phone within 4 business days after the clinic has received the results. If you do not hear from us within 7 days, please contact the clinic through MyChart or phone. If you have a critical or abnormal lab result, we will notify you by phone as soon as possible.  Submit refill requests through [employer] or call your pharmacy and they will forward the refill request to us. Please allow 3 business days for your refill to be completed.          Additional Information About Your Visit        Care EveryWhere ID     This is your Care EveryWhere ID. This could be used by other organizations to access your [facility] medical records  [id number]        Your Vitals Were     Pulse Temperature Height Last Period Pulse Oximetry BMI (Body Mass Index)    73 98.4  F (36.9  C) (Tympanic) 5' 6.75\" (1.695 m) 06/12/2018 (Approximate) 100% 33.23 kg/m2       Blood Pressure from Last 3 Encounters:   07/06/18 128/78   06/27/18 140/88   05/27/18 (!) 154/102    Weight from Last 3 Encounters:   07/06/18 210 lb 9.6 oz (95.5 " kg)   05/27/18 163 lb (73.9 kg)   05/12/17 175 lb 3.2 oz (79.5 kg)              We Performed the Following     Chlamydia trachomatis PCR     Pap imaged thin layer screen only - recommended age 21 - 24 years          Where to get your medicines      These medications were sent to [place] [facility] - - BHARATHI Schwab - [id number] [facility]  [id number] [place] [address], [place] [address]    Hours:  [place] Phone:  [phone]     levonorgestrel-ethinyl estradiol 0.1-20 MG-MCG per tablet          Primary Care Provider Office Phone # Fax #    Wilder Whitfield [phone] [address] [facility] [address] [facility] [address]        Equal Access to Services     YONI HARMAN : Hadal colon Sostevenson, waaxda luqadaha, qaybta kaalmada adeegyaaugusta, idris esquivel . So [facility] [phone].    ATENCIÓN: Si habla español, tiene a lobato disposición servicios gratuitos de asistencia lingüística. Kermit al [phone].    We comply with applicable federal civil rights laws and [place] laws. We do not discriminate on the basis of race, color, national origin, age, disability, sex, sexual orientation, or gender identity.            Thank you!     Thank you for choosing [facility]  for your care. Our goal is always to provide you with excellent care. Hearing back from our patients is one way we can continue to improve our services. Please take a few minutes to complete the written survey that you may receive in the mail after your visit with us. Thank you!             Your Updated Medication List - Protect others around you: Learn how to safely use, store and throw away your medicines at www.disposemymeds.org.          This list is accurate as of 7/6/18 11:48 AM.  Always use your most recent med list.                   Brand Name Dispense Instructions for use Diagnosis    levonorgestrel-ethinyl estradiol 0.1-20 MG-MCG per tablet    AVIANE,ALESSE,LESSINA     84 tablet    Take 1 tablet by mouth daily    Encounter for initial prescription of contraceptive pills

## 2018-07-06 NOTE — LETTER
July 11, 2018      Blanche Lyman  [address]   [facility] [address]    Dear ,      I am happy to inform you that your recent cervical cancer screening test (PAP smear) was normal.      Preventative screenings such as this help to ensure your health for years to come. You should repeat a pap smear in 3 years, unless otherwise directed.      You will still need to return to the clinic every year for your annual exam and other preventive tests.     Please contact the clinic at [phone] if you have further questions.       Sincerely,      Wilder Whitfield MD/lionel

## 2018-07-06 NOTE — PATIENT INSTRUCTIONS
Keep up the exercise! Good for you for starting this.  One pound is 3500 calories.  So to lose one pound per week need to cut out 500 calories per day or 3500 calories per week.  Do not count exercise toward or against your calories.  Write down everything that you eat and count calories or use an nory like [employer] or MyFitness Pal or website like [employer] or MyPlate or [employer]    1500 Calorie Menu: This is a well balanced healthy 1500 calorie menu so you can follow it for as long as you need!    Day 1    Breakfast   2 whole grain toast, 1 tablespoon of jelly, 1 teaspoon of butter, 1 cup of tea or coffee,   cup of orange juice.  Snack     bagel, 1 cup of yogurt.  Lunch   1 oz of sliced turkey or chicken breast, 1 tossed vegetable salad with 1 tablespoon of olive oil and lemon juice, 1 whole grain roll.  Snack     cup of fresh strawberries, 1 cup of yogurt, 1 tablespoon of granola cereal.  Dinner   3 oz of beef, grilled or broiled, 1 cup of rice, 1 teaspoon of butter,   cup of steamed carrots, mixed green salad with olive oil and lemon juice.  Snack   1 apple or orange.    Day 2    Breakfast   1 orange, 1 cup of whole grain cereal, 1 cup of milk, 1 cup of strawberries.  Snack   2 teaspoons of peanut butter, 2 rice cakes.  Lunch   1 cup of vegetable soup, 1 oz of mozzarella cheese, 1 mixed greens salad with olive oil and lemon juice, 1 cup of yogurt, few whole grain crackers.  Snack   1 apple.  Dinner   5 oz of white fish, baked, broiled or grilled, 1 baked potato, 1 cup of steamed broccoli, mixed greens salad with oil and lemon juice, 1 whole grain roll.  Snack   3 cups of plain popcorn.    Day 3    Breakfast   2 pancakes with 1 tablespoon of maple syrup or fruit spread.  Snack   1 cup of milk, 1 peach.  Lunch   6 oz of fish, grilled or broiled, mixed greens salad with 1 tablespoon of olive oil and lemon juice, 1 apple, and few whole grain crackers.  Snack   1 granola bar.  Dinner   2 cup of whole grain pasta,    cup of tomato sauce, mixed greens salad with olive oil and lemon juice.  Snack   1 cup of milk, few peanuts.    Day 4    Breakfast     cup of oatmeal, cooked with 1 teaspoons of brown sugar, 1 cup of milk, 1 orange.  Snack   1 apple, 2 oz of almonds.  Lunch   1 oz of sliced chicken or turkey breast, 1 teaspoon of mustard, 1 slices of whole wheat bread, 2 slices of tomato, 1   cup of sliced raw vegetables.  Snack     cup of milk, 1 cup of strawberries.  Dinner   3 oz skinless chicken breast, baked, broiled or grilled, 1 medium baked potato, 2 teaspoons of butter, mixed greens salad with 1 tablespoon of olive oil and lemon juice.  Snack   1/2 cup of cottage cheese, 1 pear.    Day 5    Breakfast   1 whole wheat bagel, 1 table spoon of cream cheese, 1 cup of orange juice.  Snack   1 cup of yogurt, 1 apple.  Lunch   2 oz lean hamburger, grilled or broiled, 1 cup of steamed asparagus, large tossed vegetable salad with yogurt dressing,   cup of cottage cheese.  Dinner   2 cup of pasta with 3 oz of cooked shrimp and 1 /2 cup of broccoli, 1 slice of Italian bread, 1 teaspoon of garlic olive oil, mixed greens salad with oil and vinegar.    Day 6    Breakfast   1 poached egg, 1 tomato, 1 whole wheat muffin,   grapefruit.  Snack   1 cup of fruit salad, 1 cup of yogurt, 1 granola bar.  Lunch   3 oz of sliced turkey or chicken breast, 1 yosef bread, 1 cup of sliced carrots and celery.  Snack   1 peach,   cup of cottage cheese.  Dinner   3 oz of cheese, few crackers, 1 mixed greens salad with olive oil and lemon juice, 1 glass of dry red wine.  Snack   1 cup of fresh strawberries.    Day 7    Breakfast   1 whole wheat toast, 1 hard boiled egg,   cup of blueberries, 1 cup of yogurt.  Snack   1 pear, 1 oz of pretzels.  Lunch   4 oz of cheese, 1 large vegetable salad with olive oil and lemon juice, 1 cup of milk.  Snack     cup of fruit salad, 1 granola bar.  Dinner   3 oz of broiled or baked white fish, 1   cup of rice, 1 cup of  steamed vegetables, 2 teaspoons of butter.            Thank you for choosing [facility].  You may be receiving a survey in the mail from [employer] regarding your visit today.  Please take a few minutes to complete and return the survey to let us know how we are doing.      If you have questions or concerns, please contact us via [employer] or you can contact your care team at [phone].    Our Clinic hours are:  Monday 6:40 am  to 7:00 pm  Tuesday -Friday 6:40 am to 5:00 pm    The [place] outpatient lab hours are:  Monday - Friday 6:10 am to 4:45 pm  Saturdays 7:00 am to 11:00 am  Appointments are required, call [phone]    If you have clinical questions after hours or would like to schedule an appointment,  call the clinic at [phone].    Preventive Health Recommendations  Female Ages 21 to 25     Yearly exam:     See your health care provider every year in order to  o Review health changes.   o Discuss preventive care.    o Review your medicines if your doctor has prescribed any.      You should be tested each year for STDs (sexually transmitted diseases).       Talk to your provider about how often you should have cholesterol testing.      Get a Pap test every three years. If you have an abnormal result, your doctor may have you test more often.      If you are at risk for diabetes, you should have a diabetes test (fasting glucose).     Shots:     Get a flu shot each year.     Get a tetanus shot every 10 years.     Consider getting the shot (vaccine) that prevents cervical cancer (Gardasil).    Nutrition:     Eat at least 5 servings of fruits and vegetables each day.    Eat whole-grain bread, whole-wheat pasta and brown rice instead of white grains and rice.    Get adequate Calcium and Vitamin D.     Lifestyle    Exercise at least 150 minutes a week each week (30 minutes a day, 5 days a week). This will help you control your weight and prevent disease.    Limit alcohol to one drink per day.    No  smoking.     Wear sunscreen to prevent skin cancer.    See your dentist every six months for an exam and cleaning.

## 2018-07-06 NOTE — PROGRESS NOTES
SUBJECTIVE:   CC: Blanche Lyman is an 23 year old woman who presents for preventive health visit.     Healthy Habits:    Do you get at least three servings of calcium containing foods daily (dairy, green leafy vegetables, etc.)? yes    Amount of exercise or daily activities, outside of work: 4 day(s) per week    Problems taking medications regularly No    Medication side effects: No    Have you had an eye exam in the past two years? no    Do you see a dentist twice per year? no    Do you have sleep apnea, excessive snoring or daytime drowsiness?no      PROBLEMS TO ADD ON...  none    Today's PHQ-2 Score:   PHQ-2 ( 1999 Pfizer) 7/6/2018 5/12/2017   Q1: Little interest or pleasure in doing things 0 0   Q2: Feeling down, depressed or hopeless 0 0   PHQ-2 Score 0 0       Abuse: Current or Past(Physical, Sexual or Emotional)- No  Do you feel safe in your environment - Yes    Social History   Substance Use Topics     Smoking status: Never Smoker     Smokeless tobacco: Never Used     Alcohol use Not on file     If you drink alcohol do you typically have >3 drinks per day or >7 drinks per week? No                     Reviewed orders with patient.  Reviewed health maintenance and updated orders accordingly - Yes  BP Readings from Last 3 Encounters:   07/06/18 128/78   06/27/18 140/88   05/27/18 (!) 154/102    Wt Readings from Last 3 Encounters:   07/06/18 210 lb 9.6 oz (95.5 kg)   05/27/18 163 lb (73.9 kg)   05/12/17 175 lb 3.2 oz (79.5 kg)                    Mammogram not appropriate for this patient based on age.    Pertinent mammograms are reviewed under the imaging tab.  History of abnormal Pap smear: NO - age 21-29 PAP every 3 years recommended     Reviewed and updated as needed this visit by clinical staff  Tobacco  Allergies  Meds  Med Hx  Surg Hx  Fam Hx  Soc Hx        Reviewed and updated as needed this visit by Provider            ROS:  CONSTITUTIONAL: NEGATIVE for fever, chills, change in  "weight  INTEGUMENTARU/SKIN: NEGATIVE for worrisome rashes, moles or lesions  EYES: NEGATIVE for vision changes or irritation  ENT: NEGATIVE for ear, mouth and throat problems  RESP: NEGATIVE for significant cough or SOB  BREAST: NEGATIVE for masses, tenderness or discharge  CV: NEGATIVE for chest pain, palpitations or peripheral edema  GI: NEGATIVE for nausea, abdominal pain, heartburn, or change in bowel habits  : NEGATIVE for unusual urinary or vaginal symptoms. Periods are regular.  MUSCULOSKELETAL: NEGATIVE for significant arthralgias or myalgia  NEURO: NEGATIVE for weakness, dizziness or paresthesias  PSYCHIATRIC: NEGATIVE for changes in mood or affect    OBJECTIVE:   /78  Pulse 73  Temp 98.4  F (36.9  C) (Tympanic)  Ht 5' 6.75\" (1.695 m)  Wt 210 lb 9.6 oz (95.5 kg)  LMP 06/12/2018 (Approximate)  SpO2 100%  BMI 33.23 kg/m2  EXAM:  GENERAL: healthy, alert and no distress  EYES: Eyes grossly normal to inspection, PERRL and conjunctivae and sclerae normal  HENT: ear canals and TM's normal, nose and mouth without ulcers or lesions  NECK: no adenopathy, no asymmetry, masses, or scars and thyroid normal to palpation  RESP: lungs clear to auscultation - no rales, rhonchi or wheezes  BREAST: normal without masses, tenderness or nipple discharge and no palpable axillary masses or adenopathy  CV: regular rate and rhythm, normal S1 S2, no S3 or S4, no murmur, click or rub, no peripheral edema and peripheral pulses strong  ABDOMEN: soft, nontender, no hepatosplenomegaly, no masses and bowel sounds normal   (female): normal female external genitalia, normal urethral meatus, vaginal mucosa pink, moist, well rugated, and normal cervix/adnexa/uterus without masses or discharge  MS: no gross musculoskeletal defects noted, no edema  SKIN: no suspicious lesions or rashes  NEURO: Normal strength and tone, mentation intact and speech normal  PSYCH: mentation appears normal, affect normal/bright    Diagnostic Test " "Results:  none     ASSESSMENT/PLAN:   Blanche was seen today for physical.    Diagnoses and all orders for this visit:    Well woman exam with routine gynecological exam  -     Pap imaged thin layer screen only - recommended age 21 - 24 years    Encounter for initial prescription of contraceptive pills  -     levonorgestrel-ethinyl estradiol (AVIANE,ALESSE,LESSINA) 0.1-20 MG-MCG per tablet; Take 1 tablet by mouth daily    Screen for STD (sexually transmitted disease)  -     Chlamydia trachomatis PCR        COUNSELING:   Reviewed preventive health counseling, as reflected in patient instructions       Regular exercise       Healthy diet/nutrition       Vision screening       Contraception       Family planning    BP Readings from Last 1 Encounters:   07/06/18 128/78     Estimated body mass index is 33.23 kg/(m^2) as calculated from the following:    Height as of this encounter: 5' 6.75\" (1.695 m).    Weight as of this encounter: 210 lb 9.6 oz (95.5 kg).      Weight management plan: Discussed healthy diet and exercise guidelines and patient will follow up in 12 months in clinic to re-evaluate.     reports that she has never smoked. She has never used smokeless tobacco.      Counseling Resources:  ATP IV Guidelines  Pooled Cohorts Equation Calculator  Breast Cancer Risk Calculator  FRAX Risk Assessment  ICSI Preventive Guidelines  Dietary Guidelines for Americans, 2010  USDA's MyPlate  ASA Prophylaxis  Lung CA Screening    Wilder Whitfield MD  [facility]  "

## 2018-07-10 LAB
C TRACH DNA SPEC QL NAA+PROBE: NEGATIVE
COPATH REPORT: NORMAL
PAP: NORMAL
SPECIMEN SOURCE: NORMAL

## 2019-07-17 ENCOUNTER — HOSPITAL ENCOUNTER (EMERGENCY)
Facility: CLINIC | Age: 24
Discharge: HOME | End: 2019-07-17
Attending: NURSE PRACTITIONER | Admitting: NURSE PRACTITIONER
Payer: COMMERCIAL

## 2019-07-17 ENCOUNTER — APPOINTMENT (OUTPATIENT)
Dept: GENERAL RADIOLOGY | Facility: CLINIC | Age: 24
End: 2019-07-17
Attending: NURSE PRACTITIONER
Payer: COMMERCIAL

## 2019-07-17 VITALS
HEIGHT: 67 IN | RESPIRATION RATE: 16 BRPM | WEIGHT: 210 LBS | BODY MASS INDEX: 32.96 KG/M2 | HEART RATE: 74 BPM | TEMPERATURE: 98.1 F | SYSTOLIC BLOOD PRESSURE: 134 MMHG | OXYGEN SATURATION: 100 % | DIASTOLIC BLOOD PRESSURE: 94 MMHG

## 2019-07-17 DIAGNOSIS — S93.402A MODERATE LEFT ANKLE SPRAIN, INITIAL ENCOUNTER: Primary | ICD-10-CM

## 2019-07-17 PROCEDURE — 73610 X-RAY EXAM OF ANKLE: CPT | Mod: LT

## 2019-07-17 PROCEDURE — 99214 OFFICE O/P EST MOD 30 MIN: CPT | Mod: Z6 | Performed by: NURSE PRACTITIONER

## 2019-07-17 PROCEDURE — G0463 HOSPITAL OUTPT CLINIC VISIT: HCPCS | Performed by: NURSE PRACTITIONER

## 2019-07-17 ASSESSMENT — MIFFLIN-ST. JEOR: SCORE: 1740.18

## 2019-07-17 NOTE — DISCHARGE INSTRUCTIONS
You may take 3 tablets or 4 tablets of ibuprofen 3 times daily as needed for pain management.  You may utilize ice and elevation to help with pain and swelling as well.  Follow-up with orthopedics for recheck of the ankle.  Return sooner if you are having worsening pain or difficulty.   no fever and no chills.

## 2019-07-17 NOTE — LETTER
July 17, 2019      To Whom It May Concern:      Blanche Lyman was seen in our Emergency Department today, 07/17/19.  I expect her condition to improve over the next several days.      Sincerely,        MARIO Pierre CNP

## 2019-07-17 NOTE — ED AVS SNAPSHOT
[facility] Emergency Department  [address] [address]  Phone:  [phone]  Fax:  [phone]                                    Blanche Lyman   MRN: [id number]    Department:  [facility] Emergency Department   Date of Visit:  7/17/2019           After Visit Summary Signature Page    I have received my discharge instructions, and my questions have been answered. I have discussed any challenges I see with this plan with the nurse or doctor.    ..........................................................................................................................................  Patient/Patient Representative Signature      ..........................................................................................................................................  Patient Representative Print Name and Relationship to Patient    ..................................................               ................................................  Date                                   Time    ..........................................................................................................................................  Reviewed by Signature/Title    ...................................................              ..............................................  Date                                               Time          22EPIC Rev 08/18

## 2019-07-17 NOTE — ED PROVIDER NOTES
History     Chief Complaint   Patient presents with     Ankle Pain     twisted left ankle     HPI  Blanche Lyman is a 24 year old female who presents here urgent care with left ankle injury.  Patient reports that she was at home and was walking her dog and her foot stepped in a hole in the yard and twisted with immediate popping noise, crunching noise, and pain.  Patient describes the pain as aching and throbbing and rates the pain a 5 out of 10.  Patient denies loss of sensation and motion distal to the injury.  Patient reports feeling well otherwise and denies any fever, aches, chills, sweats, chest pain, shortness of air, ear pain, eye pain, throat pain, dysuria, vomiting, abdominal pain.    Allergies:  Allergies   Allergen Reactions     Eggs [Chicken-Derived Products (Egg)] Other (See Comments)     Headaches, stomach aches       Milk Protein Extract Other (See Comments)     Headaches, stomach aches       Peanuts [Nuts] Other (See Comments)     Headaches, stomach aches       Wheat Gluten [Gluten Meal] Other (See Comments)     Headaches, stomach aches         Problem List:    There are no active problems to display for this patient.       Past Medical History:    History reviewed. No pertinent past medical history.    Past Surgical History:    History reviewed. No pertinent surgical history.    Family History:    Family History   Problem Relation Age of Onset     Heart Disease Maternal Grandfather      Cancer Paternal Grandfather      Heart Disease Paternal Grandfather        Social History:  Marital Status:  Single [1]  Social History     Tobacco Use     Smoking status: Never Smoker     Smokeless tobacco: Never Used   Substance Use Topics     Alcohol use: None     Drug use: None        Medications:      levonorgestrel-ethinyl estradiol (AVIANE,ALESSE,LESSINA) 0.1-20 MG-MCG per tablet     Review of Systems  Pertinent positives and negatives listed in the HPI, all other systems reviewed and are  "negative.    Physical Exam   BP: (!) 134/94  Pulse: 74  Temp: 98.1  F (36.7  C)  Resp: 16  Height: 170.2 cm (5' 7\")  Weight: 95.3 kg (210 lb)(stated)  SpO2: 100 %      Physical Exam   Constitutional: She appears well-developed and well-nourished. No distress.   HENT:   Head: Normocephalic and atraumatic.   Right Ear: External ear normal.   Left Ear: External ear normal.   Eyes: Conjunctivae are normal. Right eye exhibits no discharge. Left eye exhibits no discharge.   Cardiovascular: Normal rate, regular rhythm and normal heart sounds. Exam reveals no friction rub.   No murmur heard.  Pulmonary/Chest: Effort normal and breath sounds normal. No stridor. No respiratory distress. She has no wheezes. She has no rales. She exhibits no tenderness.   Musculoskeletal:        Left ankle: She exhibits swelling (lateral malleolus). She exhibits normal range of motion, no ecchymosis, no deformity, no laceration and normal pulse. Tenderness. Lateral malleolus, medial malleolus and AITFL tenderness found. Achilles tendon exhibits no pain and no defect.   Neurological: She is alert.   Skin: Skin is warm and dry. No rash noted. She is not diaphoretic. No erythema. No pallor.   Psychiatric: She has a normal mood and affect.   Nursing note and vitals reviewed.      ED Course        Procedures    Results for orders placed or performed during the hospital encounter of 07/17/19 (from the past 24 hour(s))   XR Ankle Left G/E 3 Views    Narrative    Examination:  XR ANKLE LT G/E 3 VW    Date:  7/17/2019 6:15 PM     Clinical Information: Twisted ankle in hole in yard and heard pop and  crunch, immediate pain.    Additional Information: none    Comparison: none    Findings:     Moderate soft tissue swelling in the lateral ankle. Negative for  fracture. No joint malalignment. The ankle mortise is intact. The  talar dome is normal.      Impression    Impression:    1.  Negative for fracture or joint malalignment in the left ankle.  2.  " Moderate soft tissue swelling laterally.    ELANA AGARWAL MD       Medications - No data to display    Assessments & Plan (with Medical Decision Making)     I have reviewed the nursing notes.    I have reviewed the findings, diagnosis, plan and need for follow up with the patient.  Blanche Lyman is a 24 year old female who presents here urgent care with left ankle injury secondary to a twisting fall injury that occurred at home within the last hour.  Patient having difficulty with weightbearing due to pain.  Exam reveals swelling of the lateral malleolus and is not distal to injury.  No obvious deformities noted.  X-ray of the ankle ordered to evaluate for fracture or dislocation with differential diagnosis of sprain.  No obvious fracture noted.  Reviewed ankle sprain care with patient.  Patient options of gel splint versus cam walker boot and benefits explained and patient requesting cam walker boot.  Patient still reports moderate pain with ambulation with the cam walker boot and requesting crutches.  Crutch walking encourage care for discussed.  Patient verbalized understanding.  Discussed pain management with ibuprofen.  Will also order orthopedic referral for ongoing care of the ankle sprain and discussed possible need for physical therapy.  Patient verbalized understanding regarding all of the above.  Patient discharged in stable condition.       Medication List      There are no discharge medications for this visit.         Final diagnoses:   Moderate left ankle sprain, initial encounter       7/17/2019   [facility] EMERGENCY DEPARTMENT     Leida Falcon, MARIO CNP  07/17/19 [id number]

## 2019-07-22 ENCOUNTER — APPOINTMENT (OUTPATIENT)
Dept: GENERAL RADIOLOGY | Facility: CLINIC | Age: 24
End: 2019-07-22
Attending: STUDENT IN AN ORGANIZED HEALTH CARE EDUCATION/TRAINING PROGRAM
Payer: COMMERCIAL

## 2019-07-22 ENCOUNTER — NURSE TRIAGE (OUTPATIENT)
Dept: NURSING | Facility: CLINIC | Age: 24
End: 2019-07-22

## 2019-07-22 ENCOUNTER — HOSPITAL ENCOUNTER (EMERGENCY)
Facility: CLINIC | Age: 24
Discharge: HOME | End: 2019-07-22
Attending: STUDENT IN AN ORGANIZED HEALTH CARE EDUCATION/TRAINING PROGRAM | Admitting: STUDENT IN AN ORGANIZED HEALTH CARE EDUCATION/TRAINING PROGRAM
Payer: COMMERCIAL

## 2019-07-22 VITALS
WEIGHT: 210 LBS | BODY MASS INDEX: 32.96 KG/M2 | DIASTOLIC BLOOD PRESSURE: 82 MMHG | SYSTOLIC BLOOD PRESSURE: 123 MMHG | OXYGEN SATURATION: 100 % | HEIGHT: 67 IN | TEMPERATURE: 98.9 F | RESPIRATION RATE: 16 BRPM

## 2019-07-22 DIAGNOSIS — M79.672 LEFT FOOT PAIN: ICD-10-CM

## 2019-07-22 PROCEDURE — 99284 EMERGENCY DEPT VISIT MOD MDM: CPT | Performed by: STUDENT IN AN ORGANIZED HEALTH CARE EDUCATION/TRAINING PROGRAM

## 2019-07-22 PROCEDURE — 73610 X-RAY EXAM OF ANKLE: CPT | Mod: LT

## 2019-07-22 PROCEDURE — 99284 EMERGENCY DEPT VISIT MOD MDM: CPT | Mod: Z6 | Performed by: STUDENT IN AN ORGANIZED HEALTH CARE EDUCATION/TRAINING PROGRAM

## 2019-07-22 PROCEDURE — 73630 X-RAY EXAM OF FOOT: CPT | Mod: LT

## 2019-07-22 ASSESSMENT — MIFFLIN-ST. JEOR: SCORE: 1740.18

## 2019-07-22 NOTE — ED AVS SNAPSHOT
[facility] Emergency Department  [address] [address]  Phone:  [phone]  Fax:  [phone]                                    Blanche Lyman   MRN: [id number]    Department:  [facility] Emergency Department   Date of Visit:  7/22/2019           After Visit Summary Signature Page    I have received my discharge instructions, and my questions have been answered. I have discussed any challenges I see with this plan with the nurse or doctor.    ..........................................................................................................................................  Patient/Patient Representative Signature      ..........................................................................................................................................  Patient Representative Print Name and Relationship to Patient    ..................................................               ................................................  Date                                   Time    ..........................................................................................................................................  Reviewed by Signature/Title    ...................................................              ..............................................  Date                                               Time          22EPIC Rev 08/18

## 2019-07-23 NOTE — ED PROVIDER NOTES
History     Chief Complaint   Patient presents with     Ankle Pain     Left ankle/ increasing pain/ numbness/ injured last week     HPI  Blanche Lyman is a 24 year old female who presents for evaluation of increasing left lateral foot pain after injury sustained 5 days ago.  Records confirm she was seen at the urgent care, radiographs of left ankle were without identifiable fracture, was placed in a splint and has remained nonweightbearing since the injury.  However the past 24 hours she has noted increasing bruising of the dorsal left foot, left lateral foot tenderness, and also diminished sensation of digits 4 and 5 of the left foot.  She denies new injury, weakness, or other associated symptoms.    Allergies:  Allergies   Allergen Reactions     Eggs [Chicken-Derived Products (Egg)] Other (See Comments)     Headaches, stomach aches       Milk Protein Extract Other (See Comments)     Headaches, stomach aches       Peanuts [Nuts] Other (See Comments)     Headaches, stomach aches       Wheat Gluten [Gluten Meal] Other (See Comments)     Headaches, stomach aches         Problem List:    There are no active problems to display for this patient.       Past Medical History:    No past medical history on file.    Past Surgical History:    No past surgical history on file.    Family History:    Family History   Problem Relation Age of Onset     Heart Disease Maternal Grandfather      Cancer Paternal Grandfather      Heart Disease Paternal Grandfather        Social History:  Marital Status:  Single [1]  Social History     Tobacco Use     Smoking status: Never Smoker     Smokeless tobacco: Never Used   Substance Use Topics     Alcohol use: Not on file     Drug use: Not on file        Medications:      levonorgestrel-ethinyl estradiol (AVIANE,ALESSE,LESSINA) 0.1-20 MG-MCG per tablet         Review of Systems  Constitutional:  Negative for fever or illness.  Musculoskeletal: Positive for pain and swelling of left foot for  "injury 5 days ago.  Neurological: Positive for numbness of left toes 4 and 5.  Skin: Positive for contusions of left.    All others reviewed and are negative.      Physical Exam   BP: 123/82  Heart Rate: 68  Temp: 98.9  F (37.2  C)  Resp: 16  Height: 170.2 cm (5' 7\")  Weight: 95.3 kg (210 lb)  SpO2: 100 %      Physical Exam  Constitutional:  Well developed, well nourished.  Appears nontoxic and in no acute distress.   HENT:  Normocephalic and atraumatic.  Eyes:  Conjunctivae are normal.  Neck:  Neck supple.  Cardiovascular:  No cyanosis.   Respiratory:  Effort normal, no respiratory distress.   Musculoskeletal: Tenderness with moderate swelling of left lateral malleolus and left dorsal and lateral foot, overlying contusions but no appreciable deformity.  L5 dorsiflexion and dorsal foot sensory intact.  S1 plantar flexion and plantar foot sensation intact.  Achilles tendon intact.  Sensation intact of all digits diffusely, including deep fibular distribution of first webbing space.  2/4 palpable dorsalis pedis and posterior tibial pulses.  No cyanosis and capillary refill less than 2 seconds in each digit.  Patient is able to flex/extend all digits suggesting intact tendons.  Neurological:  Patient is alert.  Skin:  Skin is warm and dry.  Psychiatric:  Normal mood and affect.      ED Course        Procedures               Critical Care time:  none               Results for orders placed or performed during the hospital encounter of 07/22/19 (from the past 24 hour(s))   Foot XR, G/E 3 views, left    Narrative    XR LEFT FOOT THREE OR MORE VIEWS   7/22/2019 10:28 PM     INDICATION: Increasing left lateral foot pain after injury 5 days ago.    COMPARISON: None.      Impression    IMPRESSION: Negative.    ANA M STEWART MD   XR Ankle Left G/E 3 Views    Narrative    XR LEFT ANKLE THREE OR MORE VIEWS   7/22/2019 10:28 PM     INDICATION: Persistent lateral pain after fall 1 week ago.    COMPARISON: 7/17/2019.      " Impression    IMPRESSION: No acute fracture or dislocation. The lateral soft tissue  swelling seen previously has improved.    ANA M STEWART MD       Medications - No data to display    Assessments & Plan (with Medical Decision Making)   Blanche Lyman is a 24 year old female who presents to the emergency department for complaint of persistent left ankle pain but increasing left foot pain and contusions since injury last week.  Based on her symptoms and the clinical examination, there is no evidence to suggest bony deformity, skin laceration, Achilles tendon rupture, joint laxity, or neurovascular deficits.  Radiographs were independently reviewed and without identifiable fracture or other acute bony abnormality.  Radiologist read is consistent.  Suspect she suffered soft tissue injuries such as sprains in addition to the noted contusions, she does not have sensory deficits of the left foot or digits but potentially diminished sensation which could be due to the swelling.  Recommend she remain nonweightbearing and schedule orthopedic follow-up for reevaluation, referral order was again placed.        Disclaimer:  This note consists of symbols derived from keyboarding, dictation, and/or voice recognition software.  As a result, there may be errors in the script that have gone undetected.  Please consider this when interpreting information found in the chart.         I have reviewed the nursing notes.    I have reviewed the findings, diagnosis, plan and need for follow up with the patient.          Medication List      There are no discharge medications for this visit.         Final diagnoses:   Left foot pain       7/22/2019   [facility] EMERGENCY DEPARTMENT     Sai Conroy,   07/23/19 1554

## 2019-07-23 NOTE — ED NOTES
Patient was here last week for a sprained ankle. She arrives to today in a cam walker and states she feels her pain has intensified and she is concerned because there is new bruising along her toes. She states the majority of her pain is along the outer malleolar area.

## 2019-07-23 NOTE — TELEPHONE ENCOUNTER
S: Calling about twisted L ankle.  B: Blanche gave writer permission to talk with her boyfriend Reinier about her health.  On 7/17 twisted her ankle by stepping in a hole in the yard.  X-rays done showing no FX. Swelling over Lateral malleolus.  Went home with Cam Walker Boot and Crutches,  Today starting to have an increase in swelling,  bruising, and neuro changes.  With her eyes closed could tell when boyfriend touched great toe, unable to tell which one of the other toes he was touching.  The top of the toes are bruised  and more swollen than yesterday.  Lateral side of her ankle is more painful and swollen.  Has tried to keep foot elevated and iced when not at work.  Wears cam walker boot when up walking.  Is only partial weight bearing with the use of crutches due to pain.   A: Abnormal neuro checks.  Per guideline advised to go to the ED to be evaluated.  R: Boyfriend will drive Blanche to the [facility] ED.    Gianna Holman RN, [facility]      Reason for Disposition    [1] Numbness (new loss of sensation) of toe(s) AND [2] present now    Additional Information    Negative: Serious injury with multiple fractures    Negative: [1] Major bleeding (e.g., actively dripping or spurting) AND [2] can't be stopped    Negative: Amputation    Negative: Looks like a dislocated joint (very crooked or deformed)    Negative: Sounds like a life-threatening emergency to the triager    Negative: Bullet wound, stabbed by knife, or other serious penetrating wound    Negative: Skin is split open or gaping  (or length > 1/2 inch or 12 mm)    Negative: [1] Bleeding AND [2] won't stop after 10 minutes of direct pressure (using correct technique)    Negative: [1] Dirt in the wound AND [2] not removed with 15 minutes of scrubbing    Negative: Can't stand (bear weight) or walk    Protocols used: FOOT AND ANKLE INJURY-A-AH

## 2019-08-02 ENCOUNTER — ANCILLARY PROCEDURE (OUTPATIENT)
Dept: GENERAL RADIOLOGY | Facility: CLINIC | Age: 24
End: 2019-08-02
Attending: FAMILY MEDICINE

## 2019-08-02 ENCOUNTER — OFFICE VISIT (OUTPATIENT)
Dept: ORTHOPEDICS | Facility: CLINIC | Age: 24
End: 2019-08-02
Payer: COMMERCIAL

## 2019-08-02 VITALS — HEIGHT: 67 IN | SYSTOLIC BLOOD PRESSURE: 110 MMHG | BODY MASS INDEX: 32.89 KG/M2 | DIASTOLIC BLOOD PRESSURE: 70 MMHG

## 2019-08-02 DIAGNOSIS — M25.562 ACUTE PAIN OF LEFT KNEE: ICD-10-CM

## 2019-08-02 DIAGNOSIS — S93.402A MODERATE LEFT ANKLE SPRAIN, INITIAL ENCOUNTER: Primary | ICD-10-CM

## 2019-08-02 PROCEDURE — 73590 X-RAY EXAM OF LOWER LEG: CPT | Mod: LT

## 2019-08-02 PROCEDURE — 99203 OFFICE O/P NEW LOW 30 MIN: CPT | Performed by: FAMILY MEDICINE

## 2019-08-02 NOTE — PATIENT INSTRUCTIONS
Diagnosis: Left Lateral Ankle Sprain  Image Findings: Negative for fracture x2 for ankle, no fracture at knee  Treatment: Transition to ankle brace and physical therapy  Medications: Limited ibuprofen/tylenol  Follow-up: In one month if not better, sooner if pain worsening    It was great seeing you today!    Erlin Farris    Patient Education     Treating Ankle Sprains  Treatment will depend on how bad your sprain is. For a severe sprain, healing may take 3 months or more.  Right after your injury: Use R.I.C.E.    BIG: Rest: At first, keep weight off the ankle as much as you can. You may be given crutches to help you walk without putting weight on the ankle.    BIG: Ice: Put an ice pack on the ankle for 20 minutes. Remove the pack and wait at least 30 minutes. Repeat for up to 3 days. This helps reduce swelling.    BIG: Compression: To reduce swelling and keep the joint stable, you may need to wrap the ankle with an elastic bandage. For more severe sprains, you may need an ankle brace, a boot, or a cast.    BIG: Elevation: To reduce swelling, keep your ankle raised above your heart when you sit or lie down.  Medicine  Your healthcare provider may suggest oral nonsteroidal anti-inflammatory medicine (NSAIDs), such as ibuprofen. This relieves the pain and helps reduce swelling. Be sure to take your medicine as directed.  Exercises    After about 2 to 3 weeks, you may be given exercises to strengthen the ligaments and muscles in the ankle. Doing these exercises will help prevent another ankle sprain. Exercises may include standing on your toes and then on your heels and doing ankle curls.    Sit on the edge of a sturdy table or lie on your back.    Pull your toes toward you. Then point them away from you. Repeat for 2 to 3 minutes.  Date Last Reviewed: 1/1/2018 2000-2018 [employer]. [address] [place] [address], [place] [address]. All rights reserved. This information is not intended as a substitute  for professional medical care. Always follow your healthcare professional's instructions.

## 2019-08-02 NOTE — LETTER
8/2/2019         RE: Blanche Lyman  [address] [facility] [id number]        Dear Colleague,    Thank you for referring your patient, Blanche Lyman, to the [facility]. Please see a copy of my visit note below.    ASSESSMENT & PLAN  Blanche was seen today for pain.    Diagnoses and all orders for this visit:    Moderate left ankle sprain, initial encounter  -     [facility]; Future  -     order for DME; Equipment being ordered: medium figure 8 ankle brace    Acute pain of left knee  -     XR Tibia & Fibula Left 2 Views; Future  -     order for DME; Equipment being ordered: medium figure 8 ankle brace      Patient is a 24 year old female presenting for evaluation of   Chief Complaint   Patient presents with     Left Ankle - Pain      Left Ankle Sprain: TTP over ATFL, mild over CFL with pos Talar tilt.  XR neg x2 for ankle fx and Tib/fib neg for Maissoneuve fx.  Pain improved concern for prolong immobilization in IWalk and CAM boot.  Plan to transition pt to ankle brace, refer to formal PT and f/u if not improved  Treatment: transition to ankle brace  Physical Therapy ordered  Injection none  Medications  Limited NSAIDs/Tylenol    Concerning signs/sx that would warrant urgent evaluation were discussed.  All questions were answered, patient understands and agrees with plan.      No follow-ups on file.      -----    SUBJECTIVE  Blanche Lyman is a/an 24 year old female who is seen as an  referral for evaluation of right ankle pain. The patient is seen by themselves.    Onset: 7/17/19, 2.5 week(s) ago. Patient describes injury as walking dog, stepped in hole and twisted ankle.  Felt a pop, crunch and pain.   Location of Pain: left lateral ankle   Rating of Pain at worst: 5/10  Rating of Pain Currently: 3/10  Worsened by: weight bearing, inversion   Better with: non weight bearing   Treatments tried: iwalk, cam boot, Tylenol   Associated symptoms:  "numbness in toes, swelling   Orthopedic history: YES - self limiting ankle injury   Relevant surgical history: NO  No past medical history on file.  Social History     Socioeconomic History     Marital status: Single     Spouse name: None     Number of children: None     Years of education: None     Highest education level: None   Occupational History     None   Social Needs     Financial resource strain: None     Food insecurity:     Worry: None     Inability: None     Transportation needs:     Medical: None     Non-medical: None   Tobacco Use     Smoking status: Never Smoker     Smokeless tobacco: Never Used   Substance and Sexual Activity     Alcohol use: None     Drug use: None     Sexual activity: None   Lifestyle     Physical activity:     Days per week: None     Minutes per session: None     Stress: None   Relationships     Social connections:     Talks on phone: None     Gets together: None     Attends Sikh service: None     Active member of club or organization: None     Attends meetings of clubs or organizations: None     Relationship status: None     Intimate partner violence:     Fear of current or ex partner: None     Emotionally abused: None     Physically abused: None     Forced sexual activity: None   Other Topics Concern     Parent/sibling w/ CABG, MI or angioplasty before 65F 55M? No   Social History Narrative     None         Patient's past medical, surgical, social, and family histories were reviewed today and no changes are noted.    REVIEW OF SYSTEMS:  10 point ROS is negative other than symptoms noted above in HPI, Past Medical History or as stated below  Constitutional: NEGATIVE for fever, chills, change in weight  Skin: NEGATIVE for worrisome rashes, moles or lesions  GI/: NEGATIVE for bowel or bladder changes  Neuro: NEGATIVE for weakness, dizziness or paresthesias    OBJECTIVE:  /70   Ht 1.702 m (5' 7\")   BMI 32.89 kg/m      General: healthy, alert and in no distress  HEENT: " no scleral icterus or conjunctival erythema  Skin: no suspicious lesions or rash. No jaundice.  CV:  no pedal edema  Resp: normal respiratory effort without conversational dyspnea   Psych: normal mood and affect  Gait: normal steady gait with appropriate coordination and balance  Neuro: Normal light sensory exam of lower extremity  MSK:  LEFT ANKLE  Inspection:  Lateral ankle swelling, ecchymosis over dorsal MTP joints  Palpation:    Tender about the ATFL, CFL and mild pain diffusely around swelling with no focal tenderness.  TTP over prox fibula. Remainder of bony and ligamentous landmarks are nontender.  Range of Motion:     Plantarflexion full / dorsiflexion full / inversion limited slightly by pain / eversion limited slightly by pain  Strength:    full  Special Tests:    negative anterior drawer, positive talar tilt, negative valgus stress, negative forced external rotation/eversion, negative Stewart sign, negative squeeze test. Unable to perform heel raise and Unable to hop.    LEFT FOOT  Inspection:    no swelling or ecchymosis  Palpation:    Non-tender.  Range of Motion:     Grossly intact and non-painful  Strength:    Grossly intact in all planes  Special Tests:    Positive: None    Negative: anterior drawer, calcaneal squeeze, MTP stress and Lisfranc joint tenderness    Independent visualization of the below image:  No results found for this or any previous visit (from the past 24 hour(s)).  XR LEFT ANKLE THREE OR MORE VIEWS   7/22/2019 10:28 PM      INDICATION: Persistent lateral pain after fall 1 week ago.     COMPARISON: 7/17/2019.                                                                      IMPRESSION: No acute fracture or dislocation. The lateral soft tissue  swelling seen previously has improved.     ANA M STEWART MD    Patient's conditions were thoroughly discussed during today's visit with greater than 50% of the visit spent counseling the patient with total time spent face-to-face with  the patient being 30 minutes.    Erlin Farris MD [facility]      Again, thank you for allowing me to participate in the care of your patient.        Sincerely,        Erlin Farris MD

## 2019-08-28 DIAGNOSIS — Z30.011 ENCOUNTER FOR INITIAL PRESCRIPTION OF CONTRACEPTIVE PILLS: ICD-10-CM

## 2019-08-28 RX ORDER — LEVONORGESTREL/ETHIN.ESTRADIOL 0.1-0.02MG
1 TABLET ORAL DAILY
Qty: 28 TABLET | Refills: 0 | Status: SHIPPED | OUTPATIENT
Start: 2019-08-28 | End: 2020-12-16

## 2019-08-28 NOTE — TELEPHONE ENCOUNTER
"Requested Prescriptions   Pending Prescriptions Disp Refills     FALMINA 0.1-20 MG-MCG tablet [Pharmacy Med Name: FALMINA 0.1-0.02MG TABLET] 84 tablet 4     Sig: Take 1 tablet by mouth daily   Last Written Prescription Date:  7/6/18  Last Fill Quantity: 84 tab,  # refills: 4   Last office visit: 7/6/2018 with prescribing provider:  Wilder Whitfield     Future Office Visit:        Contraceptives Protocol Failed - 8/28/2019  8:00 AM        Failed - Recent (12 mo) or future (30 days) visit within the authorizing provider's specialty     Patient had office visit in the last 12 months or has a visit in the next 30 days with authorizing provider or within the authorizing provider's specialty.  See \"Patient Info\" tab in inbasket, or \"Choose Columns\" in Meds & Orders section of the refill encounter.              Passed - Patient is not a current smoker if age is 35 or older        Passed - Medication is active on med list        Passed - No active pregnancy on record        Passed - No positive pregnancy test in past 12 months          "

## 2020-02-06 ENCOUNTER — NURSE TRIAGE (OUTPATIENT)
Dept: FAMILY MEDICINE | Facility: CLINIC | Age: 25
End: 2020-02-06

## 2020-02-06 NOTE — TELEPHONE ENCOUNTER
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 and she is coughing and so is her boyfriend. She is wondering if she should be checked for the Corona Virus.    Phone Number Patient can be reached at: Home number on file [phone] (home)    Best Time: any    Can we leave a detailed message on this number? YES   Shefali Delarosa  Clinic Station        Call taken on 2/6/2020 at 11:34 AM by Shefali Tafoya

## 2020-02-06 NOTE — TELEPHONE ENCOUNTER
At this point, I would not recommend testing for coronavirus.  [place] is the area of concern.  Likely URI.  Could be seen if she has concerns.    Yolande Tovar, CNP

## 2020-02-06 NOTE — TELEPHONE ENCOUNTER
S-(situation): cough    B-(background): LOV 07/06/18 Nahid    A-(assessment): Patient asking if she should be checked for Corona Virus. States she traveled by car to [place].     Cough and runny nose started yesterday. Has chest discomfort from coughing. No other symptoms. States cough is similar to bronchitis.     R-(recommendations): Routing to provider. Recommend UC or ok to wait for clinic appt tomorrow? Are we checking for corona virus in clinic or does patient go elsewhere?     ROXANA MolinaN, RN          Additional Information    Negative: Bluish (or gray) lips or face    Negative: Severe difficulty breathing (e.g., struggling for each breath, speaks in single words)    Negative: Rapid onset of cough and has hives    Negative: Coughing started suddenly after medicine, an allergic food or bee sting    Negative: Difficulty breathing after exposure to flames, smoke, or fumes    Negative: Sounds like a life-threatening emergency to the triager    Negative: Chest pain present when not coughing    Negative: Difficulty breathing    Negative: Passed out (i.e., fainted, collapsed and was not responding)    Negative: Patient sounds very sick or weak to the triager    Negative: Coughed up > 1 tablespoon (15 ml) blood (Exception: blood-tinged sputum)    Negative: Fever > 103 F (39.4 C)    Negative: Fever > 101 F (38.3 C) and over 60 years of age    Negative: Fever > 100.0 F (37.8 C) and has diabetes mellitus or a weak immune system (e.g., HIV positive, cancer chemotherapy, organ transplant, splenectomy, chronic steroids)    Negative: Fever > 100.0 F (37.8 C) and bedridden (e.g., nursing home patient, stroke, chronic illness, recovering from surgery)    Negative: Increasing ankle swelling    Negative: Wheezing is present    Negative: SEVERE coughing spells (e.g., whooping sound after coughing, vomiting after coughing)    Negative: Coughing up ruperto-colored (reddish-brown) or blood-tinged sputum    Negative: Fever present  "> 3 days (72 hours)    Negative: Fever returns after gone for over 24 hours and symptoms worse or not improved    Negative: Using nasal washes and pain medicine > 24 hours and sinus pain persists    Negative: Known COPD or other severe lung disease (i.e., bronchiectasis, cystic fibrosis, lung surgery) and worsening symptoms (i.e., increased sputum purulence or amount, increased breathing difficulty)    Patient wants to be seen    Answer Assessment - Initial Assessment Questions  1. ONSET: \"When did the cough begin?\"       Yesterday  2. SEVERITY: \"How bad is the cough today?\"       \"close to bronchitis\"  3. RESPIRATORY DISTRESS: \"Describe your breathing.\"       No  4. FEVER: \"Do you have a fever?\" If so, ask: \"What is your temperature, how was it measured, and when did it start?\"      No  5. HEMOPTYSIS: \"Are you coughing up any blood?\" If so ask: \"How much?\" (flecks, streaks, tablespoons, etc.)      No  6. TREATMENT: \"What have you done so far to treat the cough?\" (e.g., meds, fluids, humidifier)      Cough drops, tea  7. CARDIAC HISTORY: \"Do you have any history of heart disease?\" (e.g., heart attack, congestive heart failure)       No  8. LUNG HISTORY: \"Do you have any history of lung disease?\"  (e.g., pulmonary embolus, asthma, emphysema)      No  9. PE RISK FACTORS: \"Do you have a history of blood clots?\" (or: recent major surgery, recent prolonged travel, bedridden )      No  10. OTHER SYMPTOMS: \"Do you have any other symptoms? (e.g., runny nose, wheezing, chest pain)        Runny nose  11. PREGNANCY: \"Is there any chance you are pregnant?\" \"When was your last menstrual period?\"        no  12. TRAVEL: \"Have you traveled out of the country in the last month?\" (e.g., travel history, exposures)        No    Protocols used: COUGH-A-[place]    "

## 2020-03-01 ENCOUNTER — HEALTH MAINTENANCE LETTER (OUTPATIENT)
Age: 25
End: 2020-03-01

## 2020-04-30 ENCOUNTER — NURSE TRIAGE (OUTPATIENT)
Dept: NURSING | Facility: CLINIC | Age: 25
End: 2020-04-30

## 2020-05-01 NOTE — TELEPHONE ENCOUNTER
"Blanche reports that she has bruising on the side of her right big toe. First noticed it after working on her yard 1 week ago, no known trauma to the area. Reports shooting pains which comes and goes, rated 4/10 now. States that it is tender to walk on, \"I think it might be fractured.\" Bruising is small, the size of a Tylenol pill.      COVID 19 Nurse Triage Plan/Patient Instructions    Please be aware that novel coronavirus (COVID-19) may be circulating in the community. If you develop symptoms such as fever, cough, or SOB or if you have concerns about the presence of another infection including coronavirus (COVID-19), please contact your health care provider or visit [email or web address].     Disposition/Instructions    Patient to have scheduled clinic Visit with a provider. Follow System Ambulatory Workflow for COVID 19. (within 3 days - face to face visit preferred) She said she'll discuss with partner first and will call back to schedule.    Call Back If: Your symptoms worsen before you are able to complete your Visit with a provider.    Thank you for limiting contact with others, wearing a simple mask to cover your cough, practice good hand hygiene habits and accessing our virtual services where possible to limit the spread of this virus.    For more information about COVID19 and options for caring for yourself at home, please visit the CDC website at https://www.cdc.gov/coronavirus/2019-ncov/about/steps-when-sick.html  For more options for care at [facility], please visit our website at [email or web address]    For more information, please use the [place] Department of Health COVID-19 Website: https://www.health.state.[place]/diseases/coronavirus/index.html  [place] Department of Health ([facility]) COVID-19 Hotlines (Interpreters available):      Health questions: Phone Number: [phone] or 1-535.732.3933 and Hours: 7 a.m. to 7 p.m.    Schools and  questions: Phone Number: " [phone] or [phone] and Hours 7 a.m. to 7 p.m.      Fabiola Olivas RN/[place] Nurse Advisor      Reason for Disposition    Pain in the big toe joint    Additional Information    Negative: Foot is cool or blue in comparison to other foot    Negative: Purple or black skin on toe  (Exception: simple recalled injury with bruise)    Negative: [1] Looks infected (e.g., spreading redness, red streak, pus) AND [2] fever    Negative: Patient sounds very sick or weak to the triager    Negative: [1] SEVERE pain (e.g., excruciating, unable to do any normal activities) AND [2] not improved after 2 hours of pain medicine    Negative: [1] Redness spreading into foot or red streak into foot AND [2] no fever    Negative: Looks like a boil, infected sore, or deep ulcer    Negative: [1] Swollen toe AND [2] no fever  (Exceptions: just localized bump from bunion, corns, insect bite, sting)    Negative: Yellow pus seen in skin around toenail (cuticle area), or pus seen under toenail    Negative: Numbness in one foot (i.e., loss of sensation)    Negative: [1] MODERATE pain (e.g., interferes with normal activities, limping) AND [2] present > 3 days    Negative: Localized redness and swelling of skin around nail (i.e., cuticle area or nail fold)    Protocols used: TOE PAIN-A-AH

## 2020-10-21 ENCOUNTER — TELEPHONE (OUTPATIENT)
Dept: FAMILY MEDICINE | Facility: CLINIC | Age: 25
End: 2020-10-21

## 2020-10-21 ENCOUNTER — HOSPITAL ENCOUNTER (EMERGENCY)
Facility: CLINIC | Age: 25
Discharge: HOME | End: 2020-10-21
Attending: NURSE PRACTITIONER | Admitting: NURSE PRACTITIONER
Payer: COMMERCIAL

## 2020-10-21 VITALS
WEIGHT: 230 LBS | BODY MASS INDEX: 36.1 KG/M2 | HEART RATE: 71 BPM | RESPIRATION RATE: 18 BRPM | DIASTOLIC BLOOD PRESSURE: 101 MMHG | HEIGHT: 67 IN | SYSTOLIC BLOOD PRESSURE: 150 MMHG | OXYGEN SATURATION: 100 % | TEMPERATURE: 98.6 F

## 2020-10-21 DIAGNOSIS — H92.01 OTALGIA, RIGHT: ICD-10-CM

## 2020-10-21 PROCEDURE — G0463 HOSPITAL OUTPT CLINIC VISIT: HCPCS | Performed by: NURSE PRACTITIONER

## 2020-10-21 PROCEDURE — 99213 OFFICE O/P EST LOW 20 MIN: CPT | Performed by: NURSE PRACTITIONER

## 2020-10-21 ASSESSMENT — MIFFLIN-ST. JEOR: SCORE: 1825.9

## 2020-10-21 ASSESSMENT — ENCOUNTER SYMPTOMS
SINUS PRESSURE: 0
LIGHT-HEADEDNESS: 0
SORE THROAT: 0
CHILLS: 0
DIZZINESS: 0
COUGH: 0
FEVER: 0
SHORTNESS OF BREATH: 0
NUMBNESS: 0
TROUBLE SWALLOWING: 0
FATIGUE: 0
RHINORRHEA: 0
WEAKNESS: 0
SINUS PAIN: 0
HEADACHES: 0

## 2020-10-21 NOTE — ED AVS SNAPSHOT
[facility] Emergency Dept  [address] [address]  Phone: [phone]  Fax: [phone]                                    Blanche Lyman   MRN: [id number]    Department: [facility] Emergency Dept   Date of Visit: 10/21/2020           After Visit Summary Signature Page    I have received my discharge instructions, and my questions have been answered. I have discussed any challenges I see with this plan with the nurse or doctor.    ..........................................................................................................................................  Patient/Patient Representative Signature      ..........................................................................................................................................  Patient Representative Print Name and Relationship to Patient    ..................................................               ................................................  Date                                   Time    ..........................................................................................................................................  Reviewed by Signature/Title    ...................................................              ..............................................  Date                                               Time          22EPIC Rev 08/18

## 2020-10-21 NOTE — TELEPHONE ENCOUNTER
She has not tried anything for pain at this point. Advised she take otc pain med. Her bone behind the ear is tender to touch. Advised protocol says  ,Be seen in 2-4 hours. Randee Brandt RN

## 2020-10-21 NOTE — TELEPHONE ENCOUNTER
Reason for call:  Patient reporting a symptom    Symptom or request: Ear pain in the Right side. The pain is going down her jaw line. What can she do at home.    Duration (how long have symptoms been present): 2-3 days    Have you been treated for this before? Yes      Phone Number patient can be reached at:  Home number on file [phone] (home)    Best Time:  any    Can we leave a detailed message on this number:  YES    Call taken on 10/21/2020 at 4:46 PM by Yesenia Hassan

## 2020-10-21 NOTE — ED PROVIDER NOTES
History     Chief Complaint   Patient presents with     Otalgia     HPI  Blanche Lyman is a 25 year old female who presents the urgent care for evaluation of right ear pain for 2 days.  Patient feels that today the pain is progressing and now has tenderness throughout the lower jaw.  Denies fevers, headaches, dizziness, tinnitus, balance disturbance, congestion and sore throat.  Patient suffered from multiple ear infections as a child but not as an adult.  No known sick contacts.  No over-the-counter remedies trialed before arrival.    Allergies:  Allergies   Allergen Reactions     Eggs [Chicken-Derived Products (Egg)] Other (See Comments)     Headaches, stomach aches       Milk Protein Extract Other (See Comments)     Headaches, stomach aches       Peanuts [Nuts] Other (See Comments)     Headaches, stomach aches       Wheat Gluten [Gluten Meal] Other (See Comments)     Headaches, stomach aches       Problem List:    There are no active problems to display for this patient.     Past Medical History:    No past medical history on file.    Past Surgical History:    No past surgical history on file.    Family History:    Family History   Problem Relation Age of Onset     Heart Disease Maternal Grandfather      Cancer Paternal Grandfather      Heart Disease Paternal Grandfather      Social History:  Marital Status:  Single [1]  Social History     Tobacco Use     Smoking status: Never Smoker     Smokeless tobacco: Never Used   Substance Use Topics     Alcohol use: Not on file     Drug use: Not on file      Medications:         levonorgestrel-ethinyl estradiol (FALMINA) 0.1-20 MG-MCG tablet       order for DME      Review of Systems   Constitutional: Negative for chills, fatigue and fever.   HENT: Positive for ear pain. Negative for congestion, ear discharge, rhinorrhea, sinus pressure, sinus pain, sore throat and trouble swallowing.    Respiratory: Negative for cough and shortness of breath.    Cardiovascular:  "Negative for chest pain.   Neurological: Negative for dizziness, weakness, light-headedness, numbness and headaches.   All other systems reviewed and are negative.    Physical Exam   BP: (!) 150/101  Pulse: 71  Temp: 98.6  F (37  C)  Resp: 18  Height: 170.2 cm (5' 7\")  Weight: 104.3 kg (230 lb)  SpO2: 100 %    Physical Exam  Constitutional:       General: She is not in acute distress.     Appearance: She is well-developed. She is not diaphoretic.   HENT:      Head: Normocephalic.      Right Ear: There is impacted cerumen.      Left Ear: Tympanic membrane and ear canal normal.   Eyes:      Conjunctiva/sclera: Conjunctivae normal.      Pupils: Pupils are equal, round, and reactive to light.   Neck:      Musculoskeletal: Normal range of motion and neck supple.   Cardiovascular:      Rate and Rhythm: Normal rate and regular rhythm.      Pulses: Normal pulses.   Pulmonary:      Effort: Pulmonary effort is normal. No respiratory distress.      Breath sounds: Normal breath sounds and air entry. No decreased air movement. No decreased breath sounds, wheezing or rhonchi.   Abdominal:      General: There is no distension.      Palpations: Abdomen is soft.      Tenderness: There is no abdominal tenderness.   Musculoskeletal: Normal range of motion.   Skin:     General: Skin is warm.      Capillary Refill: Capillary refill takes less than 2 seconds.   Neurological:      General: No focal deficit present.      Mental Status: She is alert and oriented to person, place, and time.   Psychiatric:         Mood and Affect: Mood normal.       ED Course        Procedures  No results found for this or any previous visit (from the past 24 hour(s)).    Medications - No data to display    Assessments & Plan (with Medical Decision Making)   Blanche Lyman is a 25 year old female who presents the urgent care for evaluation of right ear pain for 2 days.  Patient feels that today the pain is progressing and now has tenderness throughout the " lower jaw.  Denies fevers, headaches, dizziness, tinnitus, balance disturbance, congestion and sore throat.  Patient suffered from multiple ear infections as a child but not as an adult. Exam as above. Right cerumen impaction. Once removed, bilateral ears normal and without indication of infection. OTC medications as needed. Low suspicion for mastoiditis, trigeminal neuralgia, dental abscess. Return precautions reviewed, all questions answered. Patient is agreeable to plan of care and discharged in no acute distress.     I have reviewed the nursing notes.    I have reviewed the findings, diagnosis, plan and need for follow up with the patient.  New Prescriptions    No medications on file     Final diagnoses:   Otalgia, right     10/21/2020   [facility] EMERGENCY DEPT     Nadia Munoz, APRN CNP  10/21/20 1922

## 2020-10-24 ENCOUNTER — NURSE TRIAGE (OUTPATIENT)
Dept: NURSING | Facility: CLINIC | Age: 25
End: 2020-10-24

## 2020-10-24 NOTE — TELEPHONE ENCOUNTER
Patient says she still has ear pain after earwax was removed in urgent care.  Patient says pain is deep int he ear; pain level is 4-5/10 can spike to 6-7/10; pain is her face and neck.  Patient has no fever and denies any new ear injury.  Reviewed care advice with caller per RN triage protocol guideline to be evaluated w/i 2 weeks.  Caller verbalized understanding and agrees with plan and transferred to schedule in-person clinic visit.    COVID 19 Nurse Triage Plan/Patient Instructions    Please be aware that novel coronavirus (COVID-19) may be circulating in the community. If you develop symptoms such as fever, cough, or SOB or if you have concerns about the presence of another infection including coronavirus (COVID-19), please contact your health care provider or visit [email or web address].     Disposition/Instructions    In-Person Visit with provider recommended. Reference Visit Selection Guide.    Thank you for taking steps to prevent the spread of this virus.  o Limit your contact with others.  o Wear a simple mask to cover your cough.  o Wash your hands well and often.    Resources    [facility]: About COVID-19: [email or web address]    CDC: What to Do If You're Sick: www.cdc.gov/coronavirus/2019-ncov/about/steps-when-sick.html    CDC: Ending Home Isolation: www.cdc.gov/coronavirus/2019-ncov/hcp/disposition-in-home-patients.html     CDC: Caring for Someone: www.cdc.gov/coronavirus/2019-ncov/if-you-are-sick/care-for-someone.html     [facility]: Interim Guidance for Hospital Discharge to Home: www.health.[facility].[place]/diseases/coronavirus/hcp/hospdischarge.pdf    [facility] clinical trials (COVID-19 research studies): clinicalaffairs.[email or web address].edu/um-clinical-trials     Below are the COVID-19 hotlines at the [place] Department of Health ([facility]). Interpreters are available.   o For health questions: Call 533-243-1254 or 1-634.102.9122 (7 a.m. to 7 p.m.)  o For questions about schools and childcare:  Call 479-760-1985 or 1-936.521.8193 (7 a.m. to 7 p.m.)                       Reason for Disposition    [1] Earwax problem AND [2] no improvement using CARE ADVICE    Additional Information    Negative: Patient sounds very sick or weak to the triager    Negative: Sudden onset of ear pain or bleeding after long - thin object was inserted into the ear canal (e.g., pencil, Q-tip)    Negative: [1] Ear pain after ear syringing (i.e., squirting liquid into ear canal to remove wax) AND [2] severe    Negative: [1] Ear pain after ear syringing (i.e., squirting liquid into ear canal to remove wax) AND [2] persists > 1 hour    Negative: Walking is very unsteady  (Exception: brief dizziness that occurs with ear syringing)    Negative: Redness or rash of outer ear    Negative: Pus from the ear canal (e.g., yellow or green discharge)    Negative: History of ear drum perforation, tubes or ear surgery    Negative: Complete hearing loss in either ear    Negative: Diabetes    Protocols used: EARWAX-A-AH

## 2020-10-26 ENCOUNTER — OFFICE VISIT (OUTPATIENT)
Dept: FAMILY MEDICINE | Facility: CLINIC | Age: 25
End: 2020-10-26
Payer: COMMERCIAL

## 2020-10-26 VITALS
SYSTOLIC BLOOD PRESSURE: 124 MMHG | WEIGHT: 230 LBS | HEIGHT: 67 IN | BODY MASS INDEX: 36.1 KG/M2 | RESPIRATION RATE: 14 BRPM | HEART RATE: 76 BPM | DIASTOLIC BLOOD PRESSURE: 86 MMHG | TEMPERATURE: 99 F | OXYGEN SATURATION: 99 %

## 2020-10-26 DIAGNOSIS — H66.91 ACUTE BACTERIAL OTITIS MEDIA, RIGHT: ICD-10-CM

## 2020-10-26 DIAGNOSIS — H60.391 INFECTIVE OTITIS EXTERNA, RIGHT: Primary | ICD-10-CM

## 2020-10-26 PROCEDURE — 99214 OFFICE O/P EST MOD 30 MIN: CPT | Performed by: FAMILY MEDICINE

## 2020-10-26 RX ORDER — OFLOXACIN 3 MG/ML
SOLUTION/ DROPS OPHTHALMIC
Qty: 10 ML | Refills: 0 | Status: SHIPPED | OUTPATIENT
Start: 2020-10-26 | End: 2020-10-29

## 2020-10-26 ASSESSMENT — MIFFLIN-ST. JEOR: SCORE: 1825.9

## 2020-10-26 NOTE — PATIENT INSTRUCTIONS
Expect improvement to start in the next 48 hours.  If pain does not improve by Wednesday night, call the ENT clinic scheduler and get an appointment to see the specialist for the ear.    Ibuprofen 200 mg 2-3 tablets with food every 8 hrs as needed for pain.    Contact the care team at anytime if you develop fever.  Patient Education     Middle Ear Infection (Adult)  You have an infection of the middle ear, the space behind the eardrum. This is also called acute otitis media (AOM). Sometimes it is caused by the common cold. This is because congestion can block the internal passage (eustachian tube) that drains fluid from the middle ear. When the middle ear fills with fluid, bacteria can grow there and cause an infection. Oral antibiotics are used to treat this illness, not ear drops. Symptoms usually start to improve within 1 to 2 days of treatment.    Home care  The following are general care guidelines:    Finish all of the antibiotic medicine given, even though you may feel better after the first few days.    You may use over-the-counter medicine, such as acetaminophen or ibuprofen, to control pain and fever, unless something else was prescribed. If you have chronic liver or kidney disease or have ever had a stomach ulcer or gastrointestinal bleeding, talk with your healthcare provider before using these medicines. Do not give aspirin to anyone under 18 years of age who has a fever. It may cause severe illness or death.  Follow-up care  Follow up with your healthcare provider, or as advised, in 2 weeks if all symptoms have not gotten better, or if hearing doesn't go back to normal within 1 month.  When to seek medical advice  Call your healthcare provider right away if any of these occur:    Ear pain gets worse or does not improve after 3 days of treatment    Unusual drowsiness or confusion    Neck pain, stiff neck, or headache    Fluid or blood draining from the ear canal    Fever of 100.4 F (38 C) or as  advised     Seizure  Date Last Reviewed: 6/1/2016 2000-2019 [employer]. [address] [place] [address], [place] [address]. All rights reserved. This information is not intended as a substitute for professional medical care. Always follow your healthcare professional's instructions.

## 2020-10-26 NOTE — PROGRESS NOTES
Subjective     Blanche Lyman is a 25 year old female who presents to clinic today for the following health issues:  Chief Complaint   Patient presents with     ER F/U     Pt here for post e/r f/u for right ear infection.       HPI         ED/UC Followup:    Facility:  [facility]  Date of visit: 10/21/20  Reason for visit: right ear discomfort  Current Status: ear has gotten worse, now going into her right jaw also, hurts to eat, ear also feels itchy   Patient said no abx was given at the ER.  Was told just needs cleaning of the canal.    Pain has worsened since.  Patient denies measuring temp at home.  Denies feeling feverish, sore throat, rhinorrhea, nasal congestion, nausea, dizziness or emesis.  No recent ear infections.      There is no problem list on file for this patient.    History reviewed. No pertinent surgical history.    Social History     Tobacco Use     Smoking status: Never Smoker     Smokeless tobacco: Never Used   Substance Use Topics     Alcohol use: Yes     Comment: occasional     Family History   Problem Relation Age of Onset     Heart Disease Maternal Grandfather      Cancer Paternal Grandfather      Heart Disease Paternal Grandfather          Current Outpatient Medications   Medication Sig Dispense Refill     levonorgestrel-ethinyl estradiol (FALMINA) 0.1-20 MG-MCG tablet Take 1 tablet by mouth daily (Needs follow-up appointment for this medication) 28 tablet 0     order for DME Equipment being ordered: medium figure 8 ankle brace       Allergies   Allergen Reactions     Eggs [Chicken-Derived Products (Egg)] Other (See Comments)     Headaches, stomach aches       Milk Protein Extract Other (See Comments)     Headaches, stomach aches       Peanuts [Nuts] Other (See Comments)     Headaches, stomach aches       Wheat Gluten [Gluten Meal] Other (See Comments)     Headaches, stomach aches           Review of Systems   C: NEGATIVE for fever, chills, change in weight  I: NEGATIVE for worrisome rashes,  "moles or lesions  E: NEGATIVE for vision changes or irritation  ENT/MOUTH: see above  RESP:as above  CV: NEGATIVE for chest pain, palpitations or peripheral edema  GI: NEGATIVE for nausea, abdominal pain, heartburn, or change in bowel habits  M: NEGATIVE for significant arthralgias or myalgia      Objective    /86   Pulse 76   Temp 99  F (37.2  C) (Tympanic)   Resp 14   Ht 1.702 m (5' 7\")   Wt 104.3 kg (230 lb)   SpO2 99%   BMI 36.02 kg/m    Body mass index is 36.02 kg/m .  Physical Exam   GENERAL:  alert and no distress  EYES: pink conjunctivae, no icterus  NECK: mild cervical adenopathy, nontender  HEENT:  nose with mild congestion, no sinus tenderness, throat mildly erythematous, no exudates, no oral ulcers  LEFT EAR: EAM clear, tympanic membrane intact with no injection,  no effusion  RIGHT EAR: EAM moderately indurated and tender on otoscopy and with moderate amt of dry debris, tympanic membrane partially visible moderately injected with dull light reflection, difficult to see if with  effusion  SKIN:  Good turgor, no rashes    No results found for this or any previous visit (from the past 24 hour(s)).        Assessment & Plan     Blanche was seen today for er f/u.    Diagnoses and all orders for this visit:    Infective otitis externa, right  -     ofloxacin (OCUFLOX) 0.3 % ophthalmic solution; 5 drops to right ear twice a day for 7 days  Discussed course and treatment of condition.  Antibiotics have been sent to pharmacy of choice.  Advised to avoid probing external ear canal with any foreign object.  Advised to call clinic if with no improvement in 7-10 days.    Acute bacterial otitis media, right  -     amoxicillin-clavulanate (AUGMENTIN) 875-125 MG tablet; Take 1 tablet by mouth 2 times daily for 10 days  Discussed with patient findings on exam.  Discussed course and treatment of condition.  Antibiotic use discussed.  Analgesia: Ibuprofen 200 mg 2-3 tablets with food every 8 hrs as needed for " pain  Push oral fluids.  Return precautions discussed and given to patient.    Patient was advised of expected response to tx. See ENT if not improving.            Patient Instructions   Expect improvement to start in the next 48 hours.  If pain does not improve by Wednesday night, call the ENT clinic scheduler and get an appointment to see the specialist for the ear.    Ibuprofen 200 mg 2-3 tablets with food every 8 hrs as needed for pain.    Contact the care team at anytime if you develop fever.  Patient Education     Middle Ear Infection (Adult)  You have an infection of the middle ear, the space behind the eardrum. This is also called acute otitis media (AOM). Sometimes it is caused by the common cold. This is because congestion can block the internal passage (eustachian tube) that drains fluid from the middle ear. When the middle ear fills with fluid, bacteria can grow there and cause an infection. Oral antibiotics are used to treat this illness, not ear drops. Symptoms usually start to improve within 1 to 2 days of treatment.    Home care  The following are general care guidelines:    Finish all of the antibiotic medicine given, even though you may feel better after the first few days.    You may use over-the-counter medicine, such as acetaminophen or ibuprofen, to control pain and fever, unless something else was prescribed. If you have chronic liver or kidney disease or have ever had a stomach ulcer or gastrointestinal bleeding, talk with your healthcare provider before using these medicines. Do not give aspirin to anyone under 18 years of age who has a fever. It may cause severe illness or death.  Follow-up care  Follow up with your healthcare provider, or as advised, in 2 weeks if all symptoms have not gotten better, or if hearing doesn't go back to normal within 1 month.  When to seek medical advice  Call your healthcare provider right away if any of these occur:    Ear pain gets worse or does not improve  after 3 days of treatment    Unusual drowsiness or confusion    Neck pain, stiff neck, or headache    Fluid or blood draining from the ear canal    Fever of 100.4 F (38 C) or as advised     Seizure  Date Last Reviewed: 6/1/2016 2000-2019 [employer]. [address] [place] [address]. All rights reserved. This information is not intended as a substitute for professional medical care. Always follow your healthcare professional's instructions.               Return in about 1 week (around 11/2/2020) for see ENT if not resolved..    Dennis Adair MD  [facility]

## 2020-10-29 ENCOUNTER — NURSE TRIAGE (OUTPATIENT)
Dept: NURSING | Facility: CLINIC | Age: 25
End: 2020-10-29

## 2020-10-29 ENCOUNTER — HOSPITAL ENCOUNTER (EMERGENCY)
Facility: CLINIC | Age: 25
Discharge: HOME | End: 2020-10-29
Attending: NURSE PRACTITIONER | Admitting: NURSE PRACTITIONER
Payer: COMMERCIAL

## 2020-10-29 VITALS
SYSTOLIC BLOOD PRESSURE: 152 MMHG | HEIGHT: 67 IN | WEIGHT: 230 LBS | HEART RATE: 90 BPM | TEMPERATURE: 98.1 F | DIASTOLIC BLOOD PRESSURE: 96 MMHG | RESPIRATION RATE: 20 BRPM | BODY MASS INDEX: 36.1 KG/M2 | OXYGEN SATURATION: 100 %

## 2020-10-29 DIAGNOSIS — H60.501 ACUTE OTITIS EXTERNA OF RIGHT EAR: ICD-10-CM

## 2020-10-29 PROCEDURE — 99214 OFFICE O/P EST MOD 30 MIN: CPT | Performed by: NURSE PRACTITIONER

## 2020-10-29 PROCEDURE — G0463 HOSPITAL OUTPT CLINIC VISIT: HCPCS | Performed by: NURSE PRACTITIONER

## 2020-10-29 RX ORDER — CIPROFLOXACIN AND DEXAMETHASONE 3; 1 MG/ML; MG/ML
4 SUSPENSION/ DROPS AURICULAR (OTIC) 2 TIMES DAILY
Qty: 2.8 ML | Refills: 0 | Status: SHIPPED | OUTPATIENT
Start: 2020-10-29 | End: 2020-11-05

## 2020-10-29 ASSESSMENT — ENCOUNTER SYMPTOMS
FEVER: 0
COUGH: 0
VOMITING: 0
NEUROLOGICAL NEGATIVE: 1

## 2020-10-29 ASSESSMENT — MIFFLIN-ST. JEOR: SCORE: 1825.9

## 2020-10-29 NOTE — DISCHARGE INSTRUCTIONS
Stop Ofloxacin.  Start Ciprodex 4 drops to right ear twice a day for 7 days.  Call to make appointment for ENT clinic [phone].

## 2020-10-29 NOTE — ED AVS SNAPSHOT
[facility] Emergency Dept  [address] [address]  Phone: [phone]  Fax: [phone]                                    Blanche Lyman   MRN: [id number]    Department: [facility] Emergency Dept   Date of Visit: 10/29/2020           After Visit Summary Signature Page    I have received my discharge instructions, and my questions have been answered. I have discussed any challenges I see with this plan with the nurse or doctor.    ..........................................................................................................................................  Patient/Patient Representative Signature      ..........................................................................................................................................  Patient Representative Print Name and Relationship to Patient    ..................................................               ................................................  Date                                   Time    ..........................................................................................................................................  Reviewed by Signature/Title    ...................................................              ..............................................  Date                                               Time          22EPIC Rev 08/18

## 2020-10-29 NOTE — TELEPHONE ENCOUNTER
Pt calls in with concern of ongoing pain in ear   See previous note >  10/26/2020  [facility]   Infective otitis externa  Current Status: ear has gotten worse, now going into her right jaw also, hurts to eat, ear also feels itchy    Patient Instructions   Expect improvement to start in the next 48 hours.  If pain does not improve by Wednesday night, call the ENT clinic scheduler and get an appointment to see the specialist for the ear.     Ibuprofen 200 mg 2-3 tablets with food every 8 hrs as needed for pain.    Per pt ear IS getting worst > pain 7/8  No fever    Called our Schedulers - unable to make appt for ENT  Advised pt to call [facility] when open tomorrow and ask for referral/appt with ENT    In meantime - pt advised could go to  yet this evening for re-evaluation     Protocol and care advice reviewed  Caller states understanding of the recommended disposition  Advised to call back if further questions or concerns    Haris Mann RN / [facility]    Reason for Disposition    [1] Caller requesting NON-URGENT health information AND [2] PCP's office is the best resource    Protocols used: INFORMATION ONLY CALL-A-

## 2020-10-30 NOTE — ED PROVIDER NOTES
History     Chief Complaint   Patient presents with     Otalgia     already seen, on antibiotics,  here for stronger pain meds.      HPI  Blanche Lyman is a 25 year old female who presents to urgent care for evaluation of right ear pain.  Patient was evaluated for this 3 days ago and diagnosed with right acute otitis externa.  Patient was started on Augmentin and ofloxacin eardrops.  Symptoms have not improved.  Denies fevers.  T-max 99 at home.  No significant cough or nasal congestion.  No nausea or vomiting.    Allergies:  Allergies   Allergen Reactions     Eggs [Chicken-Derived Products (Egg)] Other (See Comments)     Headaches, stomach aches       Milk Protein Extract Other (See Comments)     Headaches, stomach aches       Peanuts [Nuts] Other (See Comments)     Headaches, stomach aches       Wheat Gluten [Gluten Meal] Other (See Comments)     Headaches, stomach aches         Problem List:    There are no active problems to display for this patient.       Past Medical History:    No past medical history on file.    Past Surgical History:    No past surgical history on file.    Family History:    Family History   Problem Relation Age of Onset     Heart Disease Maternal Grandfather      Cancer Paternal Grandfather      Heart Disease Paternal Grandfather        Social History:  Marital Status:  Single [1]  Social History     Tobacco Use     Smoking status: Never Smoker     Smokeless tobacco: Never Used   Substance Use Topics     Alcohol use: Yes     Comment: occasional     Drug use: Never        Medications:         ciprofloxacin-dexamethasone (CIPRODEX) 0.3-0.1 % otic suspension       amoxicillin-clavulanate (AUGMENTIN) 875-125 MG tablet       levonorgestrel-ethinyl estradiol (FALMINA) 0.1-20 MG-MCG tablet       order for DME          Review of Systems   Constitutional: Negative for fever.   HENT: Positive for ear pain. Negative for congestion.    Respiratory: Negative for cough.    Gastrointestinal:  "Negative for vomiting.   Neurological: Negative.    All other systems reviewed and are negative.      Physical Exam   BP: (!) 152/96  Pulse: 90  Temp: 98.1  F (36.7  C)  Resp: 20  Height: 170.2 cm (5' 7\")  Weight: 104.3 kg (230 lb)  SpO2: 100 %      Physical Exam    GENERAL APPEARANCE: alert and oriented. NAD.   EYES: conjunctiva clear  HENT: bilateral ear canals clear, intact, and without inflammation. Right ear canal edematous, eyrthematous, and exudate present. Right TM normal. Left ear canal and TM normal. Nose normal.  Oropharynx without ulcers, erythema or lesions  RESP: lungs clear to auscultation - no rales, rhonchi or wheezes  CV: regular rates and rhythm, normal S1 S2, no murmur noted      ED Course        Procedures    No results found for this or any previous visit (from the past 24 hour(s)).    Medications - No data to display    Assessments & Plan (with Medical Decision Making)   Right acute otitis externa-  Not improving after 72 hours on Augmentin and Ofloxacin.  Will stop Ofloxacin and Start Ciprodex. Hopefully adding the steroid drop will help with pain. instructed to use Tylenol or Ibuprofen for pain. Referral placed for ENT and patient provided contact information.    I have reviewed the nursing notes.    I have reviewed the findings, diagnosis, plan and need for follow up with the patient.      Discharge Medication List as of 10/29/2020  6:33 PM      START taking these medications    Details   ciprofloxacin-dexamethasone (CIPRODEX) 0.3-0.1 % otic suspension Place 4 drops into the right ear 2 times daily for 7 days, Disp-2.8 mL, R-0, E-Prescribe             Final diagnoses:   Acute otitis externa of right ear       10/29/2020   [facility] EMERGENCY DEPT     Hao, MARIO Sawyer CNP  10/29/20 1910    "

## 2020-11-27 ENCOUNTER — E-VISIT (OUTPATIENT)
Dept: FAMILY MEDICINE | Facility: CLINIC | Age: 25
End: 2020-11-27
Payer: COMMERCIAL

## 2020-11-27 DIAGNOSIS — F32.1 CURRENT MODERATE EPISODE OF MAJOR DEPRESSIVE DISORDER WITHOUT PRIOR EPISODE (H): Primary | ICD-10-CM

## 2020-11-27 DIAGNOSIS — F41.9 ANXIETY: ICD-10-CM

## 2020-11-27 PROCEDURE — 99422 OL DIG E/M SVC 11-20 MIN: CPT | Performed by: FAMILY MEDICINE

## 2020-11-27 ASSESSMENT — PATIENT HEALTH QUESTIONNAIRE - PHQ9
SUM OF ALL RESPONSES TO PHQ QUESTIONS 1-9: 16
10. IF YOU CHECKED OFF ANY PROBLEMS, HOW DIFFICULT HAVE THESE PROBLEMS MADE IT FOR YOU TO DO YOUR WORK, TAKE CARE OF THINGS AT HOME, OR GET ALONG WITH OTHER PEOPLE: VERY DIFFICULT
SUM OF ALL RESPONSES TO PHQ QUESTIONS 1-9: 16

## 2020-11-28 ASSESSMENT — PATIENT HEALTH QUESTIONNAIRE - PHQ9: SUM OF ALL RESPONSES TO PHQ QUESTIONS 1-9: 16

## 2020-11-30 NOTE — PATIENT INSTRUCTIONS
"    Warning Signs of Suicide and What You Can Do  If you think a person could be suicidal, ask, \"Have you thought about suicide?\" Asking won't make it more likely that they will try to hurt themselves. In fact, most people with suicidal thoughts say they are relieved when the question is asked.   If they say yes, they may already have a plan for how and when they will attempt it. Find out as much as you can. The more detailed the plan, and the easier it is to carry out, the more danger the person is in right now.     Know the warning signs  The warning signs for suicide include:    Threats or talk of suicide    Talking about death and dying    Changing eating or sleeping habits (for instance, not sleeping or sleeping all of the time)    Feeling hopeless    Suddenly buying a gun or other weapon    Saying things such as \"Soon, I won't be a problem\" or \"Nothing matters\"    Giving away things they own, making out a will, or planning their     Suddenly being happy or calm after being depressed  Things that put a person at a higher risk of attempting suicide include:     A history of suicide in the person's family    Past suicide attempts    Alcohol and drug use, along with impulsive behaviors    Having a diagnosed mood disorder such as depression or bipolar disorder    History of trauma or abuse including bullying    Major losses such as a divorce, death of a loved one, money problems, or legal problems    Having access to a lethal weapon (such as guns in the home)    Long-term (chronic) physical illnesses, including chronic pain    Being around others with suicidal behavior  Get help  Don't try to handle this alone. You can be the most help by getting the person to a trained professional. Suicidal thinking may be a sign of depression. This is a serious but treatable illness.   In an emergency--call 911  Don't leave the person alone. Anyone who is at imminent risk of suicide needs psychiatric services right away. " The person must be constantly watched, and never left out of sight. Call 911 or a 24-hour suicide crisis hotline. You can search for this online. You can also take the person to the nearest hospital emergency room.   Don't keep it a secret and don't wait  Call a mental health clinic or a licensed mental health professional in your area right away. This may be a psychiatrist, clinical psychologist, psychiatric or licensed clinical , marriage and family counselor, or clergy. If you don't know how to contact such professionals and there is an immediate risk, call 911. Tell them you need help for a person who is thinking about suicide.   Resources    National Suicide Prevention Fzlnzgvi978-777-3767 (522-549-MNLT)www.suicidepreventionlifeline.org    National Suicide Itfgeip819-162-9350 (800-SUICIDE)    National [place] of Mental Tlfzqh382-426-5063vcf.[facility].nih.gov    National [place] on Mental Rzvggla975-837-5164oob.adriana.org    Mental Health Zyxtjzp556-678-9505tkj.[facility].org    StayWell last reviewed this educational content on 1/1/2020 2000-2020 The [employer]. [address] [place] [address]. All rights reserved. This information is not intended as a substitute for professional medical care. Always follow your healthcare professional's instructions.          Depression: Tips to Help Yourself    As your healthcare providers help treat your depression, you can also help yourself. Keep in mind that your illness affects you emotionally, physically, mentally, and socially. So full recovery will take time. Take care of your body and your soul, and be patient with yourself as you get better.   Self-care    Educate yourself. Read about treatment and medicine options. If you have the energy, attend local conferences or support groups. Keep a list of useful websites and helpful books and use them as needed. This illness is not your fault. Don t blame yourself for your depression.    Manage  early symptoms. If you notice symptoms returning, experience triggers, or identify other factors that may lead to a depressive episode, get help as soon as possible. Ask trusted friends and family to monitor your behavior and let you know if they see anything of concern.    Work with your provider. Find a provider you can trust. Communicate honestly with that person and share information on your treatment for depression and your reaction to medicines.    Be prepared for a crisis. Know what to do if you experience a crisis. Keep the phone number of a crisis hotline and know the location of your community's urgent care centers and the closest emergency department.    Hold off on big decisions. Depression can cloud your judgment. So wait until you feel better before making major life decisions, such as changing jobs, moving, or getting  or .    Be patient. Recovering from depression is a process. Don t be discouraged if it takes some time to feel better.    Keep it simple. Depression saps your energy and concentration. So you won t be able to do all the things you used to do. Set small goals and do what you can.    Be with others. Don t isolate yourself--you ll only feel worse. Try to be with other people. And take part in fun activities when you can. Go to a movie, ballgame, [employer] service, or social event. Talk openly with people you can trust. And accept help when it s offered.    Take care of your body  People with depression often lose the desire to take care of themselves. That only makes their problems worse. During treatment and afterward, make a point to:     Exercise. It s a great way to take care of your body. And studies have shown that exercise helps fight depression. Aim for 30 minutes of moderate activity a day. Walking in small blocks of time (5-10 minutes) is a good way to start, but anything that gets you moving (gardening, house cleaning) counts.    Don't use drugs and alcohol.  These may ease the pain in the short term. But they ll only make your problems worse in the long run.    Get relief from stress. Ask your healthcare provider for relaxation exercises and techniques to help relieve stress. Consider activities like meditation, yoga, or Emeka Chi.    Eat right. A balanced and healthy diet helps keep your body healthy.    Get adequate sleep. Aim for 8 hours per night. Too much or too little sleep can cause other physical and emotional problems.  [employer] last reviewed this educational content on 12/1/2019 2000-2020 The [employer]. [address] [place] [id number]. All rights reserved. This information is not intended as a substitute for professional medical care. Always follow your healthcare professional's instructions.          Depression Affects Your Mind and Body  Everyone feels sad or  blue  from time to time for a few days or weeks. Depression is when these feelings don't go away and they interfere with daily life.  Depression is a real illness that can develop at any age. It is one of the most common mental health problems in the [place] Depression makes you feel sad, helpless, and hopeless. It gets in the way of your life and relationships. It inhibits your ability to think and act. But, with help, you can feel better again.       When I was depressed, I felt awful. I was so tired all the time I could hardly think, but at night I couldn t fall asleep. My head hurt. My stomach hurt. I didn t know what was wrong with me.    Depression affects your whole body  Brain chemicals affect your body as well as your mood. So depression may do more than just make you feel low. You may also feel bad physically. Depression can:     Cause trouble with mental tasks such as remembering, concentrating, or making decisions    Make you feel nervous and jumpy    Cause trouble sleeping. Or you may sleep too much    Change your appetite    Cause headaches, stomachaches, or other  aches and pains    Drain your body of energy  Depression and other illness  It is common for people who have chronic health problems to also have depression. It can often be hard to tell which one caused the other. A person might become depressed after finding out they have a health problem. But some studies suggest being depressed may make certain health problems more likely. And some depressed people stop taking care of themselves. This may make them more likely to get sick.   [employer] last reviewed this educational content on 1/1/2017 2000-2020 The [employer]. [address] [place] [address], [place] [address]. All rights reserved. This information is not intended as a substitute for professional medical care. Always follow your healthcare professional's instructions.        Patient Education     Treating Anxiety Disorders with Therapy    If you have an anxiety disorder, you don t have to suffer anymore. Treatment is available. Therapy (also called counseling) is often a helpful treatment for anxiety disorders. With therapy, a specially trained professional (therapist) helps you face and learn to manage your anxiety. Therapy can be short-term or long-term depending on your needs. In some cases, medicine may also be prescribed with therapy. It may take time before you notice how much therapy is helping, but stick with it. With therapy, you can feel better.  Cognitive behavioral therapy (CBT)  Cognitive behavioral therapy (CBT) teaches you to manage anxiety. It does this by helping you understand how you think and act when you re anxious. Research has shown CBT to be a very effective treatment for anxiety disorders. How CBT is run is almost like a class. It involves homework and activities to build skills that teach you to cope with anxiety step by step. It can be done in a group or one-on-one, and often takes place for a set number of sessions. CBT has two main parts:    Cognitive therapy helps you identify  the negative, irrational thoughts that occur with your anxiety. You ll learn to replace these with more positive, realistic thoughts.    Behavioral therapy helps you change how you react to anxiety. You ll learn coping skills and methods for relaxing to help you better deal with anxiety.  Other forms of therapy  Other therapy methods may work better for you than CBT. Or, you may move from CBT to another form of therapy as your treatment needs change. This may mean meeting with a therapist by yourself or in a group. Therapy can also help you work through problems in your life, such as drug or alcohol dependence, that may be making your anxiety worse.  Getting better takes time  Therapy will help you feel better and teach you skills to help manage anxiety long term. But change doesn t happen right away. It takes a commitment from you. And treatment only works if you learn to face the causes of your anxiety. So, you might feel worse before you feel better. This can sometimes make it hard to stick with it. But remember: Therapy is a very effective treatment. The results will be well worth it.  Helping yourself  If anxiety is wearing you down, here are some things you can do to cope:    Check with your doctor and rule out any physical problems that may be causing the anxiety symptoms.    If an anxiety disorder is diagnosed, seek mental healthcare. This is an illness and it can respond to treatment. Most types of anxiety disorders will respond to talk therapy and medicine.    Educate yourself about anxiety disorders. Keep track of helpful online resources and books you can use during stressful periods.    Try stress management techniques such as meditation.    Consider online or in-person support groups.    Don t fight your feelings. Anxiety feeds itself. The more you worry about it, the worse it gets. Instead, try to identify what might have triggered your anxiety. Then try to put this threat in perspective.    Keep in  mind that you can t control everything about a situation. Change what you can and let the rest take its course.    Exercise -- it s a great way to relieve tension and help your body feel relaxed.    Examine your life for stress, and try to find ways to reduce it.    Avoid caffeine and nicotine, which can make anxiety symptoms worse.    Fight the temptation to turn to alcohol or unprescribed drugs for relief. They only make things worse in the long run.   [employer] last reviewed this educational content on 1/1/2017 2000-2020 The [employer]. [address] [place] [id number]. All rights reserved. This information is not intended as a substitute for professional medical care. Always follow your healthcare professional's instructions.

## 2020-12-14 ENCOUNTER — HEALTH MAINTENANCE LETTER (OUTPATIENT)
Age: 25
End: 2020-12-14

## 2020-12-14 ENCOUNTER — MYC MEDICAL ADVICE (OUTPATIENT)
Dept: FAMILY MEDICINE | Facility: CLINIC | Age: 25
End: 2020-12-14

## 2020-12-15 ENCOUNTER — OFFICE VISIT (OUTPATIENT)
Dept: FAMILY MEDICINE | Facility: CLINIC | Age: 25
End: 2020-12-15
Payer: COMMERCIAL

## 2020-12-15 VITALS
TEMPERATURE: 98.6 F | BODY MASS INDEX: 37.7 KG/M2 | SYSTOLIC BLOOD PRESSURE: 136 MMHG | WEIGHT: 240.2 LBS | OXYGEN SATURATION: 99 % | HEART RATE: 71 BPM | DIASTOLIC BLOOD PRESSURE: 88 MMHG | HEIGHT: 67 IN

## 2020-12-15 DIAGNOSIS — H66.004 RECURRENT ACUTE SUPPURATIVE OTITIS MEDIA OF RIGHT EAR WITHOUT SPONTANEOUS RUPTURE OF TYMPANIC MEMBRANE: ICD-10-CM

## 2020-12-15 DIAGNOSIS — H60.391 INFECTIVE OTITIS EXTERNA, RIGHT: Primary | ICD-10-CM

## 2020-12-15 PROCEDURE — 99213 OFFICE O/P EST LOW 20 MIN: CPT | Performed by: INTERNAL MEDICINE

## 2020-12-15 RX ORDER — CIPROFLOXACIN AND DEXAMETHASONE 3; 1 MG/ML; MG/ML
4 SUSPENSION/ DROPS AURICULAR (OTIC) 2 TIMES DAILY
Qty: 2.8 ML | Refills: 0 | Status: SHIPPED | OUTPATIENT
Start: 2020-12-15 | End: 2020-12-22

## 2020-12-15 ASSESSMENT — MIFFLIN-ST. JEOR: SCORE: 1866.67

## 2020-12-15 ASSESSMENT — PAIN SCALES - GENERAL: PAINLEVEL: MODERATE PAIN (5)

## 2020-12-15 NOTE — PROGRESS NOTES
"Subjective     Blanche Lyman is a 25 year old female who presents to clinic today for the following health issues:  Chief Complaint   Patient presents with     Otitis Media     x 1 week, right ear drainage, a little blood and pain        HPI         Acute Illness  Acute illness concerns: right ear pain, drainage - looks like ear wax with a bit of blood initially  Onset/Duration: x 1 week   Symptoms:  Fever: no  Chills/Sweats: no  Headache (location?): YES - but on new medication started x 2 weeks ago, Zoloft  Sinus Pressure: YES- started today, bilateral behind the nose   Conjunctivitis:  no  Ear Pain: YES: right, hearing is very diminished right now  Rhinorrhea: YES  Congestion: YES- this a.m., not currently   Sore Throat: no  Cough: no  Wheeze: no  Decreased Appetite: YES- but may be medication associated   Nausea: YES- but may be medication related- seems to be improving some  Vomiting: no  Diarrhea: no  Dysuria/Freq.: no  Dysuria or Hematuria: no  Fatigue/Achiness: no  Sick/Strep Exposure: no  Therapies tried and outcome: ibuprofen, Excedrin     She had cerumen impaction in October then was subsequently treated wtih Augmentin and ofloxacin for posible infection.  She returned to the ER three days later with ongoing symptom, and ofloxacin was changed to Ciprodex.     Review of Systems   Constitutional, HEENT, pulm, GI,  systems are negative, except as otherwise noted.      Objective    /88 (BP Location: Right arm, Patient Position: Sitting, Cuff Size: Adult Large)   Pulse 71   Temp 98.6  F (37  C) (Tympanic)   Ht 1.693 m (5' 6.65\")   Wt 109 kg (240 lb 3.2 oz)   LMP 12/09/2020 (Exact Date)   SpO2 99%   BMI 38.01 kg/m    Body mass index is 38.01 kg/m .  Physical Exam   GENERAL: healthy, alert and no distress  HENT: right ear: initially occluded with purulent material with erythematous canal, after flushing some of the material out, TM is still partially occluded and not well visualized, left ear: " normal: no effusions, no erythema, normal landmarks, nose and mouth without ulcers or lesions, oropharynx clear and oral mucous membranes moist  NECK: no adenopathy, no asymmetry, masses, or scars and thyroid normal to palpation  RESP: lungs clear to auscultation - no rales, rhonchi or wheezes  CV: regular rate and rhythm, normal S1 S2, no S3 or S4, no murmur, click or rub, no peripheral edema and peripheral pulses strong            Assessment & Plan     Infective otitis externa, right  and  Possible recurrent acute suppurative otitis media of right ear without spontaneous rupture of tympanic membrane    Right canal is erythematous with purulent material present.  After flushing some of the material out, some remained on the TM and I was unable to well visualize the TM to be able to rule out otitis media.  We will therefore treat for both otitis externa and media with the combo of Augmentin and Ciprodex with which she was successfully treated in October.  Unclear why infection has recurred- she reports no obvious precipitating factors.  If symptoms do not resolve or recur again, would recommend culture.    - ciprofloxacin-dexamethasone (CIPRODEX) 0.3-0.1 % otic suspension; Place 4 drops into the right ear 2 times daily for 7 days  - amoxicillin-clavulanate (AUGMENTIN) 875-125 MG tablet; Take 1 tablet by mouth 2 times daily for 7 days     Follow-up as needed if not improving in a week or new/worsening symptoms develop.      Charli Araujo MD  [facility]

## 2021-01-22 ENCOUNTER — OFFICE VISIT (OUTPATIENT)
Dept: FAMILY MEDICINE | Facility: CLINIC | Age: 26
End: 2021-01-22
Payer: COMMERCIAL

## 2021-01-22 VITALS
RESPIRATION RATE: 14 BRPM | HEIGHT: 67 IN | HEART RATE: 75 BPM | WEIGHT: 239.4 LBS | BODY MASS INDEX: 37.57 KG/M2 | SYSTOLIC BLOOD PRESSURE: 134 MMHG | OXYGEN SATURATION: 99 % | DIASTOLIC BLOOD PRESSURE: 92 MMHG | TEMPERATURE: 98.3 F

## 2021-01-22 DIAGNOSIS — H66.004 RECURRENT ACUTE SUPPURATIVE OTITIS MEDIA OF RIGHT EAR WITHOUT SPONTANEOUS RUPTURE OF TYMPANIC MEMBRANE: ICD-10-CM

## 2021-01-22 DIAGNOSIS — H60.391 INFECTIVE OTITIS EXTERNA, RIGHT: Primary | ICD-10-CM

## 2021-01-22 PROCEDURE — 87077 CULTURE AEROBIC IDENTIFY: CPT | Performed by: INTERNAL MEDICINE

## 2021-01-22 PROCEDURE — 87107 FUNGI IDENTIFICATION MOLD: CPT | Performed by: INTERNAL MEDICINE

## 2021-01-22 PROCEDURE — 99213 OFFICE O/P EST LOW 20 MIN: CPT | Performed by: INTERNAL MEDICINE

## 2021-01-22 PROCEDURE — 87070 CULTURE OTHR SPECIMN AEROBIC: CPT | Performed by: INTERNAL MEDICINE

## 2021-01-22 PROCEDURE — 87102 FUNGUS ISOLATION CULTURE: CPT | Performed by: INTERNAL MEDICINE

## 2021-01-22 RX ORDER — LEVONORGESTREL AND ETHINYL ESTRADIOL 0.15-0.03
KIT ORAL
COMMUNITY
Start: 2020-11-09 | End: 2021-09-08

## 2021-01-22 RX ORDER — CIPROFLOXACIN AND DEXAMETHASONE 3; 1 MG/ML; MG/ML
4 SUSPENSION/ DROPS AURICULAR (OTIC) 2 TIMES DAILY
Qty: 7.5 ML
Start: 2021-01-22 | End: 2021-01-26

## 2021-01-22 ASSESSMENT — MIFFLIN-ST. JEOR: SCORE: 1860.6

## 2021-01-22 NOTE — PROGRESS NOTES
"  Assessment & Plan     Infective otitis externa, right  and  Recurrent acute suppurative otitis media of right ear without spontaneous rupture of tympanic membrane    Blanche presents again with recurrent R ear pain and drainage with exam showing likely otitis externa, possible otitis media.  Ear culture taken for further eval.  She wished to restart treatment while waiting for culture, we'll treat again with the same regimen used in December as this was at least temporarily effective.  Follow-up with ENT if clear explanation for recurrent symptoms (yeast?) found on culture.      - Ear Culture Aerobic Bacterial  - Yeast culture  - OTOLARYNGOLOGY REFERRAL  - amoxicillin-clavulanate (AUGMENTIN) 875-125 MG tablet; Take 1 tablet by mouth 2 times daily for 7 days  - ciprofloxacin-dexamethasone (CIPRODEX) 0.3-0.1 % otic suspension; Place 4 drops into the right ear 2 times daily    Charli Araujo MD  [facility]    Subjective     Blanche is a 25 year old who presents to clinic today for the following health issues     Chief Complaint   Patient presents with     Ear Problem     R ear. 3rd time in 2-3 months. has been seen for them       HPI     I saw Blanche on 12/15: \"Right canal is erythematous with purulent material present.  After flushing some of the material out, some remained on the TM and I was unable to well visualize the TM to be able to rule out otitis media.  We will therefore treat for both otitis externa and media with the combo of Augmentin and Ciprodex with which she was successfully treated in October\" Planned to culture if symptoms again recurred.     She reports that symptoms did completely resolve but then returned again a few days ago without clear provoking factor.        Acute Illness  Acute illness concerns: pain, nausea due to pain and blood drainage out of right ear  Onset/Duration: about 3 days  Symptoms:  Fever: no  Chills/Sweats: no  Headache (location?): YES- front  Sinus " "Pressure: YES  Conjunctivitis:  no  Ear Pain: YES: right  Rhinorrhea: YES- past week  Congestion: YES  Sore Throat: no  Cough: no  Wheeze: no  Decreased Appetite: no  Nausea: YES- due to pain  Vomiting: no  Diarrhea: no  Dysuria/Freq.: no  Dysuria or Hematuria: no  Fatigue/Achiness: no  Sick/Strep Exposure: no  Therapies tried and outcome: tried water irrigation at home. Excedrin to help with pain        Review of Systems   Constitutional, HEENT systems are negative, except as otherwise noted.      Objective    BP (!) 134/92 (BP Location: Left arm, Patient Position: Chair, Cuff Size: Adult Large)   Pulse 75   Temp 98.3  F (36.8  C) (Tympanic)   Resp 14   Ht 1.689 m (5' 6.5\")   Wt 108.6 kg (239 lb 6.4 oz)   SpO2 99%   BMI 38.06 kg/m    Body mass index is 38.06 kg/m .  Physical Exam   GENERAL: healthy, alert and no distress  HENT: R canal is erythematous and tender with small amount of purulent discharge, TM is opaque and yellow with overlying discharge          "

## 2021-01-23 ENCOUNTER — TELEPHONE (OUTPATIENT)
Dept: FAMILY MEDICINE | Facility: CLINIC | Age: 26
End: 2021-01-23

## 2021-01-23 DIAGNOSIS — F32.1 CURRENT MODERATE EPISODE OF MAJOR DEPRESSIVE DISORDER WITHOUT PRIOR EPISODE (H): ICD-10-CM

## 2021-01-23 DIAGNOSIS — F41.9 ANXIETY: ICD-10-CM

## 2021-01-23 NOTE — LETTER
January 27, 2021      Blanche Lyman  [address] [facility] [address]        Dear Blanche,       We received a refill request for your sertraline medication.  This medication has been refilled for 30 days as you are due to update two questionnaires for further refills.  These questionnaires are helpful in letting us know how well your anxiety/depression medication is working.  We have sent them electronically via [employer].  Please log in to complete those questionnaires.        Sincerely,        Wilder Whitfield MD

## 2021-01-25 LAB
BACTERIA SPEC CULT: ABNORMAL
Lab: ABNORMAL
Lab: ABNORMAL
SPECIMEN SOURCE: ABNORMAL
SPECIMEN SOURCE: ABNORMAL
YEAST SPEC QL CULT: ABNORMAL

## 2021-01-25 RX ORDER — CLOTRIMAZOLE 1 G/ML
SOLUTION TOPICAL 2 TIMES DAILY
Qty: 60 ML | Refills: 0 | Status: SHIPPED | OUTPATIENT
Start: 2021-01-25 | End: 2021-02-08

## 2021-01-25 NOTE — PROGRESS NOTES
Your culture grew a couple of Staph bacteria which are likely just normal colonizers of the ear canal rather than causing infection.  There was also some aspergillus fungus that grew though, so we should go ahead and start treatment with clotrimazole antifungal solution.  I sent in a prescription to the pharmacy.  You can stop the antibiotics for now.  I'd recommend going ahead and schedule the appointment with the ENT specialist as it would be good to have them recheck things.  You can cancel the appointment you have with me on 2/2.      Charli Araujo MD

## 2021-01-27 NOTE — TELEPHONE ENCOUNTER
PHQ 11/27/2020   PHQ-9 Total Score 16   Q9: Thoughts of better off dead/self-harm past 2 weeks Several days   F/U: Thoughts of suicide or self-harm Yes   F/U: Self harm-plan Yes   F/U: Self-harm action No   F/U: Safety concerns No         Nery MANSFIELD RN, BSN            
"Requested Prescriptions   Pending Prescriptions Disp Refills     sertraline (ZOLOFT) 50 MG tablet [Pharmacy Med Name: sertraline 50 mg tablet] 30 tablet 1     Sig: Take 0.5 tablets (25 mg) by mouth daily for 8 days, THEN 1 tablet (50 mg) daily.       SSRIs Protocol Failed - 1/23/2021  8:00 AM        Failed - PHQ-9 score less than 5 in past 6 months     Please review last PHQ-9 score.           Passed - Medication is active on med list        Passed - Patient is age 18 or older        Passed - No active pregnancy on record        Passed - No positive pregnancy test in last 12 months        Passed - Recent (6 mo) or future (30 days) visit within the authorizing provider's specialty     Patient had office visit in the last 6 months or has a visit in the next 30 days with authorizing provider or within the authorizing provider's specialty.  See \"Patient Info\" tab in inbasket, or \"Choose Columns\" in Meds & Orders section of the refill encounter.                 "
One refill sent, please contact patient to update PHQ9.    Thanks,  Charli Araujo MD   
Patient sent letter and mychart with questionnaires.  Alina DEVINE MSN, RN    
DC instructions

## 2021-01-29 ENCOUNTER — TELEPHONE (OUTPATIENT)
Dept: FAMILY MEDICINE | Facility: CLINIC | Age: 26
End: 2021-01-29

## 2021-01-29 NOTE — TELEPHONE ENCOUNTER
Reason for call:  Patient reporting a symptom    Symptom or request: Patient has a fungal infection in her ear, fell today, and is a bit dizzy and nauseous. It went away after 10-15 minutes. Patient has an appt with ENT on Mon, can she wait> She recently saw Dr. Araujo.    Duration (how long have symptoms been present):Renetta, 1/29/2021    Have you been treated for this before? No    Phone Number patient can be reached at:  Home number on file [phone] (home)    Best Time:  Any    Can we leave a detailed message on this number:  YES    Call taken on 1/29/2021 at 1:37 PM by Shefali Zamora

## 2021-01-29 NOTE — TELEPHONE ENCOUNTER
She fell tripping up the stairs entering home. Went down on her knees and hands. Did scratch up the knees. She did not hit head. She felt dizzy and nauseated after for 10-15 min. It happened about an hour ago. No symptoms now. Since she is feeling ok now advised she monitor her symptoms for now. If having symptoms again she may need to be seen or call back for advise.Randee Brandt RN

## 2021-01-29 NOTE — PROGRESS NOTES
Chief Complaint   Patient presents with     Ear Problem     diagnosed with fungal infection in right ear on 1/22/21 and started on Lotrimin - using 10 drops BID to affected ear- patient state ear still painful, feeliing plugged , some itching  notged     History of Present Illness  Blanche Lyman is a 25 year old female who presents to today for ear evaluation.  I am seeing this patient in consultation for right infective otitis externa at the request of the provider Dr. Araujo. Patient reports having several episodes of acute otitis external requiring topical or oral antibiotic treatment.  Most recently, she was diagnosed with fungal otitis externa in the right ear.  She was started on Lotrimin drops and referred to ENT.  She does have a low-grade dull ache but no vianca pain.  No bloody otorrhea or vianca otorrhea.  No significant dizziness or vertigo.  No previous history of ear surgery.  No significant noise exposure history.  She has been using the Lotrimin drops in the right ear.  The hearing is down in the right ear.     Past Medical History  There is no problem list on file for this patient.    Current Medications     Current Outpatient Medications:      clotrimazole (LOTRIMIN) 1 % external solution, Apply topically 2 times daily for 14 days Into the right ear canal, Disp: 60 mL, Rfl: 0     levonorgestrel-ethinyl estradiol (NORDETTE) 0.15-30 MG-MCG tablet, , Disp: , Rfl:      sertraline (ZOLOFT) 50 MG tablet, Take 1 tablets (50 mg) by mouth daily, Disp: 30 tablet, Rfl: 0    Allergies  Allergies   Allergen Reactions     Milk Protein Extract Other (See Comments)     Headaches, stomach aches       Peanuts [Nuts] Other (See Comments)     Headaches, stomach aches       Wheat Gluten [Gluten Meal] Other (See Comments)     Headaches, stomach aches         Social History   Social History     Socioeconomic History     Marital status: Single     Spouse name: Not on file     Number of children: Not on file     Years of  education: Not on file     Highest education level: Not on file   Occupational History     Not on file   Social Needs     Financial resource strain: Not on file     Food insecurity     Worry: Not on file     Inability: Not on file     Transportation needs     Medical: Not on file     Non-medical: Not on file   Tobacco Use     Smoking status: Never Smoker     Smokeless tobacco: Never Used   Substance and Sexual Activity     Alcohol use: Yes     Comment: occasional     Drug use: Never     Sexual activity: Yes     Partners: Male     Birth control/protection: Pill   Lifestyle     Physical activity     Days per week: Not on file     Minutes per session: Not on file     Stress: Not on file   Relationships     Social connections     Talks on phone: Not on file     Gets together: Not on file     Attends [employer] service: Not on file     Active member of club or organization: Not on file     Attends meetings of clubs or organizations: Not on file     Relationship status: Not on file     Intimate partner violence     Fear of current or ex partner: Not on file     Emotionally abused: Not on file     Physically abused: Not on file     Forced sexual activity: Not on file   Other Topics Concern     Parent/sibling w/ CABG, MI or angioplasty before 65F 55M? No   Social History Narrative     Not on file       Family History  Family History   Problem Relation Age of Onset     Heart Disease Maternal Grandfather      Cancer Paternal Grandfather      Heart Disease Paternal Grandfather        Review of Systems  As per HPI and PMHx, otherwise 10+ comprehensive system review is negative.    Physical Exam  /78 (BP Location: Left arm, Patient Position: Chair, Cuff Size: Adult Large)   Pulse 71   Temp 98.2  F (36.8  C) (Tympanic)   Wt 108.4 kg (239 lb)   BMI 38.00 kg/m    GENERAL: Patient is a pleasant, cooperative 25 year old female in no acute distress.  HEAD: Normocephalic, atraumatic.  Hair and scalp are normal.  EYES: Pupils  are equal, round, reactive to light and accommodation.  Extraocular movements are intact.  The sclera nonicteric without injection.  The extraocular structures are normal.  EARS:   NEUROLOGIC: Cranial nerves II through XII are grossly intact.  Voice is strong.  Patient is House-Brackmann I/VI bilaterally.  CARDIOVASCULAR: Extremities are warm and well-perfused.  No significant peripheral edema.  RESPIRATORY: Patient has nonlabored breathing without cough, wheeze, stridor.  PSYCHIATRIC: Patient is alert and oriented.  Mood and affect appear normal.  SKIN: Warm and dry.  No scalp, face, or neck lesions noted.    Physical Exam and Procedure    EARS: Normal shape and symmetry.  No tenderness when palpating the mastoid or tragal areas bilaterally.  The ears were then examined under the otic binocular microscope to perform cerumen removal.  Otoscopic exam on the right reveals impacted white ceruminous debris and fungal elements down to the level of the tympanic membrane into the anterior sulcus.  The cerumen was removed with a #3 #5 suction.  The right tympanic membrane was round, intact without evidence of effusion.  No retraction, granulation, drainage, or evidence of cholesteatoma.      Attention was turned to the left ear.  Otoscopic exam on the left reveals a moderate amount of cerumen cerumen down to the level of the tympanic membrane.  The cerumen was removed with a curette, #5 suction, and alligator forceps.  The left tympanic membrane was round, intact without evidence of effusion.  No retraction, granulation, drainage, or evidence of cholesteatoma.      Assessment and Plan     ICD-10-CM    1. Otitis externa, fungal, right ear  B36.9 Remove Cerumen    H62.41    2. History of acute otitis externa  Z86.69 Remove Cerumen     It was my pleasure seeing Blanche Lyman today in clinic.  The patient has a right fungal otitis externa.  I debrided the canal under the microscope.  I will have her stop the Lotrimin drops  at this point to decrease moisture.  We did place some clotrimazole powder in the ear today.  I had like to see her back in 3 to 4 days to recheck her ear for examination and debridement.  The discussed keeping the ear dry, and to not place anything instruments in the ear.      Aniceto Ca MD  Department of Otolarygology-Head and Neck Surgery  [facility]

## 2021-01-31 ENCOUNTER — MYC REFILL (OUTPATIENT)
Dept: FAMILY MEDICINE | Facility: CLINIC | Age: 26
End: 2021-01-31

## 2021-01-31 DIAGNOSIS — F32.1 CURRENT MODERATE EPISODE OF MAJOR DEPRESSIVE DISORDER WITHOUT PRIOR EPISODE (H): ICD-10-CM

## 2021-01-31 DIAGNOSIS — F41.9 ANXIETY: ICD-10-CM

## 2021-02-01 ENCOUNTER — OFFICE VISIT (OUTPATIENT)
Dept: OTOLARYNGOLOGY | Facility: CLINIC | Age: 26
End: 2021-02-01
Payer: COMMERCIAL

## 2021-02-01 VITALS
BODY MASS INDEX: 38 KG/M2 | DIASTOLIC BLOOD PRESSURE: 78 MMHG | TEMPERATURE: 98.2 F | WEIGHT: 239 LBS | SYSTOLIC BLOOD PRESSURE: 136 MMHG | HEART RATE: 71 BPM

## 2021-02-01 DIAGNOSIS — B36.9 OTITIS EXTERNA, FUNGAL, RIGHT EAR: Primary | ICD-10-CM

## 2021-02-01 DIAGNOSIS — H62.41 OTITIS EXTERNA, FUNGAL, RIGHT EAR: Primary | ICD-10-CM

## 2021-02-01 DIAGNOSIS — Z86.69 HISTORY OF ACUTE OTITIS EXTERNA: ICD-10-CM

## 2021-02-01 PROCEDURE — 69210 REMOVE IMPACTED EAR WAX UNI: CPT | Performed by: OTOLARYNGOLOGY

## 2021-02-01 PROCEDURE — 99203 OFFICE O/P NEW LOW 30 MIN: CPT | Mod: 25 | Performed by: OTOLARYNGOLOGY

## 2021-02-01 ASSESSMENT — PAIN SCALES - GENERAL: PAINLEVEL: MODERATE PAIN (4)

## 2021-02-01 NOTE — LETTER
2/1/2021         RE: Blanche Lyman  [address] [facility] [address]        Dear Colleague,    Thank you for referring your patient, Blanche Lyman, to the [facility]. Please see a copy of my visit note below.    Chief Complaint   Patient presents with     Ear Problem     diagnosed with fungal infection in right ear on 1/22/21 and started on Lotrimin - using 10 drops BID to affected ear- patient state ear still painful, feeliing plugged , some itching  notged     History of Present Illness  Blanche Lyman is a 25 year old female who presents to today for ear evaluation.  I am seeing this patient in consultation for right infective otitis externa at the request of the provider Dr. Araujo. Patient reports having several episodes of acute otitis external requiring topical or oral antibiotic treatment.  Most recently, she was diagnosed with fungal otitis externa in the right ear.  She was started on Lotrimin drops and referred to ENT.  She does have a low-grade dull ache but no vianca pain.  No bloody otorrhea or vianca otorrhea.  No significant dizziness or vertigo.  No previous history of ear surgery.  No significant noise exposure history.  She has been using the Lotrimin drops in the right ear.  The hearing is down in the right ear.     Past Medical History  There is no problem list on file for this patient.    Current Medications     Current Outpatient Medications:      clotrimazole (LOTRIMIN) 1 % external solution, Apply topically 2 times daily for 14 days Into the right ear canal, Disp: 60 mL, Rfl: 0     levonorgestrel-ethinyl estradiol (NORDETTE) 0.15-30 MG-MCG tablet, , Disp: , Rfl:      sertraline (ZOLOFT) 50 MG tablet, Take 1 tablets (50 mg) by mouth daily, Disp: 30 tablet, Rfl: 0    Allergies  Allergies   Allergen Reactions     Milk Protein Extract Other (See Comments)     Headaches, stomach aches       Peanuts [Nuts] Other (See Comments)     Headaches, stomach aches        Wheat Gluten [Gluten Meal] Other (See Comments)     Headaches, stomach aches         Social History   Social History     Socioeconomic History     Marital status: Single     Spouse name: Not on file     Number of children: Not on file     Years of education: Not on file     Highest education level: Not on file   Occupational History     Not on file   Social Needs     Financial resource strain: Not on file     Food insecurity     Worry: Not on file     Inability: Not on file     Transportation needs     Medical: Not on file     Non-medical: Not on file   Tobacco Use     Smoking status: Never Smoker     Smokeless tobacco: Never Used   Substance and Sexual Activity     Alcohol use: Yes     Comment: occasional     Drug use: Never     Sexual activity: Yes     Partners: Male     Birth control/protection: Pill   Lifestyle     Physical activity     Days per week: Not on file     Minutes per session: Not on file     Stress: Not on file   Relationships     Social connections     Talks on phone: Not on file     Gets together: Not on file     Attends Sikhism service: Not on file     Active member of club or organization: Not on file     Attends meetings of clubs or organizations: Not on file     Relationship status: Not on file     Intimate partner violence     Fear of current or ex partner: Not on file     Emotionally abused: Not on file     Physically abused: Not on file     Forced sexual activity: Not on file   Other Topics Concern     Parent/sibling w/ CABG, MI or angioplasty before 65F 55M? No   Social History Narrative     Not on file       Family History  Family History   Problem Relation Age of Onset     Heart Disease Maternal Grandfather      Cancer Paternal Grandfather      Heart Disease Paternal Grandfather        Review of Systems  As per HPI and PMHx, otherwise 10+ comprehensive system review is negative.    Physical Exam  /78 (BP Location: Left arm, Patient Position: Chair, Cuff Size: Adult Large)   Pulse  71   Temp 98.2  F (36.8  C) (Tympanic)   Wt 108.4 kg (239 lb)   BMI 38.00 kg/m    GENERAL: Patient is a pleasant, cooperative 25 year old female in no acute distress.  HEAD: Normocephalic, atraumatic.  Hair and scalp are normal.  EYES: Pupils are equal, round, reactive to light and accommodation.  Extraocular movements are intact.  The sclera nonicteric without injection.  The extraocular structures are normal.  EARS:   NEUROLOGIC: Cranial nerves II through XII are grossly intact.  Voice is strong.  Patient is House-Brackmann I/VI bilaterally.  CARDIOVASCULAR: Extremities are warm and well-perfused.  No significant peripheral edema.  RESPIRATORY: Patient has nonlabored breathing without cough, wheeze, stridor.  PSYCHIATRIC: Patient is alert and oriented.  Mood and affect appear normal.  SKIN: Warm and dry.  No scalp, face, or neck lesions noted.    Physical Exam and Procedure    EARS: Normal shape and symmetry.  No tenderness when palpating the mastoid or tragal areas bilaterally.  The ears were then examined under the otic binocular microscope to perform cerumen removal.  Otoscopic exam on the right reveals impacted white ceruminous debris and fungal elements down to the level of the tympanic membrane into the anterior sulcus.  The cerumen was removed with a #3 #5 suction.  The right tympanic membrane was round, intact without evidence of effusion.  No retraction, granulation, drainage, or evidence of cholesteatoma.      Attention was turned to the left ear.  Otoscopic exam on the left reveals a moderate amount of cerumen cerumen down to the level of the tympanic membrane.  The cerumen was removed with a curette, #5 suction, and alligator forceps.  The left tympanic membrane was round, intact without evidence of effusion.  No retraction, granulation, drainage, or evidence of cholesteatoma.      Assessment and Plan     ICD-10-CM    1. Otitis externa, fungal, right ear  B36.9 Remove Cerumen    H62.41    2. History  of acute otitis externa  Z86.69 Remove Cerumen     It was my pleasure seeing Blanche Lyman today in clinic.  The patient has a right fungal otitis externa.  I debrided the canal under the microscope.  I will have her stop the Lotrimin drops at this point to decrease moisture.  We did place some clotrimazole powder in the ear today.  I had like to see her back in 3 to 4 days to recheck her ear for examination and debridement.  The discussed keeping the ear dry, and to not place anything instruments in the ear.      Aniceto Ca MD  Department of Otolarygology-Head and Neck Surgery  [facility]         Again, thank you for allowing me to participate in the care of your patient.        Sincerely,        Aniceto Ca MD

## 2021-02-01 NOTE — NURSING NOTE
"Initial /78 (BP Location: Left arm, Patient Position: Chair, Cuff Size: Adult Large)   Pulse 71   Temp 98.2  F (36.8  C) (Tympanic)   Wt 108.4 kg (239 lb)   BMI 38.00 kg/m   Estimated body mass index is 38 kg/m  as calculated from the following:    Height as of 1/22/21: 1.689 m (5' 6.5\").    Weight as of this encounter: 108.4 kg (239 lb). .    Marina Michelle LPN    "

## 2021-02-02 ENCOUNTER — MYC MEDICAL ADVICE (OUTPATIENT)
Dept: FAMILY MEDICINE | Facility: CLINIC | Age: 26
End: 2021-02-02

## 2021-02-02 DIAGNOSIS — F41.9 ANXIETY: ICD-10-CM

## 2021-02-02 DIAGNOSIS — F32.1 CURRENT MODERATE EPISODE OF MAJOR DEPRESSIVE DISORDER WITHOUT PRIOR EPISODE (H): ICD-10-CM

## 2021-02-11 ENCOUNTER — OFFICE VISIT (OUTPATIENT)
Dept: OTOLARYNGOLOGY | Facility: CLINIC | Age: 26
End: 2021-02-11
Payer: COMMERCIAL

## 2021-02-11 VITALS
HEIGHT: 67 IN | SYSTOLIC BLOOD PRESSURE: 124 MMHG | BODY MASS INDEX: 37.51 KG/M2 | HEART RATE: 64 BPM | DIASTOLIC BLOOD PRESSURE: 76 MMHG | TEMPERATURE: 97.5 F | WEIGHT: 239 LBS

## 2021-02-11 DIAGNOSIS — Z86.69 HISTORY OF ACUTE OTITIS EXTERNA: Primary | ICD-10-CM

## 2021-02-11 PROCEDURE — 99213 OFFICE O/P EST LOW 20 MIN: CPT | Mod: 25 | Performed by: OTOLARYNGOLOGY

## 2021-02-11 PROCEDURE — 69210 REMOVE IMPACTED EAR WAX UNI: CPT | Performed by: OTOLARYNGOLOGY

## 2021-02-11 ASSESSMENT — MIFFLIN-ST. JEOR: SCORE: 1858.79

## 2021-02-11 NOTE — PROGRESS NOTES
Chief Complaint   Patient presents with     Ear Problem     Follow up fungal infection in right ear- is getting better     History of Present Illness  Blanche Lyman is a 25 year old female who presents today for follow-up.  I last saw the patient on 2/1/2021.  She had signs of right fungal otitis externa.  Her ear was debrided in office and her ear was powdered in office.  We had her stop the Lotrimin eardrops and had to keep the ear dry.  She returns today for follow-up.    Since last seeing the patient, she reports improvement in the ear drainage, plugging, fullness, and itching.  She feels like the hearing is improved.  No other ENT related concerns.    Past Medical History  There is no problem list on file for this patient.    Current Medications    Current Outpatient Medications:      levonorgestrel-ethinyl estradiol (NORDETTE) 0.15-30 MG-MCG tablet, , Disp: , Rfl:      sertraline (ZOLOFT) 50 MG tablet, Take 1 tablets (50 mg) by mouth daily, Disp: 90 tablet, Rfl: 0    Allergies  Allergies   Allergen Reactions     Milk Protein Extract Other (See Comments)     Headaches, stomach aches       Peanuts [Nuts] Other (See Comments)     Headaches, stomach aches       Wheat Gluten [Gluten Meal] Other (See Comments)     Headaches, stomach aches         Social History  Social History     Socioeconomic History     Marital status: Single     Spouse name: None     Number of children: None     Years of education: None     Highest education level: None   Occupational History     None   Social Needs     Financial resource strain: None     Food insecurity     Worry: None     Inability: None     Transportation needs     Medical: None     Non-medical: None   Tobacco Use     Smoking status: Never Smoker     Smokeless tobacco: Never Used   Substance and Sexual Activity     Alcohol use: Yes     Comment: occasional     Drug use: Never     Sexual activity: Yes     Partners: Male     Birth control/protection: Pill   Lifestyle      "Physical activity     Days per week: None     Minutes per session: None     Stress: None   Relationships     Social connections     Talks on phone: None     Gets together: None     Attends Mormon service: None     Active member of club or organization: None     Attends meetings of clubs or organizations: None     Relationship status: None     Intimate partner violence     Fear of current or ex partner: None     Emotionally abused: None     Physically abused: None     Forced sexual activity: None   Other Topics Concern     Parent/sibling w/ CABG, MI or angioplasty before 65F 55M? No   Social History Narrative     None       Family History  Family History   Problem Relation Age of Onset     Heart Disease Maternal Grandfather      Cancer Paternal Grandfather      Heart Disease Paternal Grandfather        Review of Systems  As per HPI and PMHx, otherwise 10 system review including the head and neck, constitutional, eyes, respiratory, GI, skin, neurologic, lymphatic, endocrine, and allergy systems is negative.    Physical Exam  /76 (BP Location: Right arm, Patient Position: Sitting, Cuff Size: Adult Regular)   Pulse 64   Temp 97.5  F (36.4  C) (Tympanic)   Ht 1.689 m (5' 6.5\")   Wt 108.4 kg (239 lb)   BMI 38.00 kg/m    GENERAL: Patient is a pleasant, cooperative 25 year old female in no acute distress.  HEAD: Normocephalic, atraumatic.  Hair and scalp are normal.  EYES: Pupils are equal, round, reactive to light and accommodation.  Extraocular movements are intact.  The sclera nonicteric without injection.  The extraocular structures are normal.  EARS: See below.  NEUROLOGIC: Cranial nerves II through XII are grossly intact.  Voice is strong.  Patient is House-Brackmann I/VI bilaterally.  CARDIOVASCULAR: Extremities are warm and well-perfused.  No significant peripheral edema.  RESPIRATORY: Patient has nonlabored breathing without cough, wheeze, stridor.  PSYCHIATRIC: Patient is alert and oriented.  Mood " and affect appear normal.  SKIN: Warm and dry.  No scalp, face, or neck lesions noted.     Physical Exam and Procedure     EARS: Normal shape and symmetry.  No tenderness when palpating the mastoid or tragal areas bilaterally.  The ears were then examined under the otic binocular microscope to perform cerumen removal.  Otoscopic exam on the right reveals a mostly clear canal with a slight amount of powder residue.  There is no significant debris or drainage. The right tympanic membrane was round, intact without evidence of effusion.  No retraction, granulation, drainage, or evidence of cholesteatoma.       Attention was turned to the left ear.  Otoscopic exam on the left reveals a mole amount of cerumen.  I removed the cerumen with an alligator forceps. The left tympanic membrane was round, intact without evidence of effusion.  No retraction, granulation, drainage, or evidence of cholesteatoma.     Assessment and Plan     ICD-10-CM    1. History of acute otitis externa  Z86.69 Remove Cerumen      It was my pleasure seeing Blanche Lyman today in clinic.  Patient appears to have resolution of her right fungal otitis externa.  I recommend observation at this point in time.  I will have her hold onto the Lotrimin drops but she can use them in the future if she begins to have symptoms.  The patient knows to contact me if she has drainage or plugging in the future I had be happy to see her.  We discussed water precautions for the ear.  The patient can return to clinic as needed.    Aniceto Ca MD  Department of Otolarygology-Head and Neck Surgery  [facility]

## 2021-02-11 NOTE — NURSING NOTE
"Initial /76 (BP Location: Right arm, Patient Position: Sitting, Cuff Size: Adult Regular)   Pulse 64   Temp 97.5  F (36.4  C) (Tympanic)   Ht 1.689 m (5' 6.5\")   Wt 108.4 kg (239 lb)   BMI 38.00 kg/m   Estimated body mass index is 38 kg/m  as calculated from the following:    Height as of this encounter: 1.689 m (5' 6.5\").    Weight as of this encounter: 108.4 kg (239 lb). .    Mar Mendes CMA    "

## 2021-02-11 NOTE — PATIENT INSTRUCTIONS
Per physician instructions      If you have questions or concerns on any instructions given to you by your provider today or if you need to schedule an appointment, you can reach us at [phone].

## 2021-02-11 NOTE — LETTER
2/11/2021         RE: Blanche Lyman  [address] [facility] [address]        Dear Colleague,    Thank you for referring your patient, Blanche Lyman, to the [facility]. Please see a copy of my visit note below.    Chief Complaint   Patient presents with     Ear Problem     Follow up fungal infection in right ear- is getting better     History of Present Illness  Blanche Lyman is a 25 year old female who presents today for follow-up.  I last saw the patient on 2/1/2021.  She had signs of right fungal otitis externa.  Her ear was debrided in office and her ear was powdered in office.  We had her stop the Lotrimin eardrops and had to keep the ear dry.  She returns today for follow-up.    Since last seeing the patient, she reports improvement in the ear drainage, plugging, fullness, and itching.  She feels like the hearing is improved.  No other ENT related concerns.    Past Medical History  There is no problem list on file for this patient.    Current Medications    Current Outpatient Medications:      levonorgestrel-ethinyl estradiol (NORDETTE) 0.15-30 MG-MCG tablet, , Disp: , Rfl:      sertraline (ZOLOFT) 50 MG tablet, Take 1 tablets (50 mg) by mouth daily, Disp: 90 tablet, Rfl: 0    Allergies  Allergies   Allergen Reactions     Milk Protein Extract Other (See Comments)     Headaches, stomach aches       Peanuts [Nuts] Other (See Comments)     Headaches, stomach aches       Wheat Gluten [Gluten Meal] Other (See Comments)     Headaches, stomach aches         Social History  Social History     Socioeconomic History     Marital status: Single     Spouse name: None     Number of children: None     Years of education: None     Highest education level: None   Occupational History     None   Social Needs     Financial resource strain: None     Food insecurity     Worry: None     Inability: None     Transportation needs     Medical: None     Non-medical: None   Tobacco Use     Smoking  "status: Never Smoker     Smokeless tobacco: Never Used   Substance and Sexual Activity     Alcohol use: Yes     Comment: occasional     Drug use: Never     Sexual activity: Yes     Partners: Male     Birth control/protection: Pill   Lifestyle     Physical activity     Days per week: None     Minutes per session: None     Stress: None   Relationships     Social connections     Talks on phone: None     Gets together: None     Attends Mu-ism service: None     Active member of club or organization: None     Attends meetings of clubs or organizations: None     Relationship status: None     Intimate partner violence     Fear of current or ex partner: None     Emotionally abused: None     Physically abused: None     Forced sexual activity: None   Other Topics Concern     Parent/sibling w/ CABG, MI or angioplasty before 65F 55M? No   Social History Narrative     None       Family History  Family History   Problem Relation Age of Onset     Heart Disease Maternal Grandfather      Cancer Paternal Grandfather      Heart Disease Paternal Grandfather        Review of Systems  As per HPI and PMHx, otherwise 10 system review including the head and neck, constitutional, eyes, respiratory, GI, skin, neurologic, lymphatic, endocrine, and allergy systems is negative.    Physical Exam  /76 (BP Location: Right arm, Patient Position: Sitting, Cuff Size: Adult Regular)   Pulse 64   Temp 97.5  F (36.4  C) (Tympanic)   Ht 1.689 m (5' 6.5\")   Wt 108.4 kg (239 lb)   BMI 38.00 kg/m    GENERAL: Patient is a pleasant, cooperative 25 year old female in no acute distress.  HEAD: Normocephalic, atraumatic.  Hair and scalp are normal.  EYES: Pupils are equal, round, reactive to light and accommodation.  Extraocular movements are intact.  The sclera nonicteric without injection.  The extraocular structures are normal.  EARS: See below.  NEUROLOGIC: Cranial nerves II through XII are grossly intact.  Voice is strong.  Patient is " House-Brackmann I/VI bilaterally.  CARDIOVASCULAR: Extremities are warm and well-perfused.  No significant peripheral edema.  RESPIRATORY: Patient has nonlabored breathing without cough, wheeze, stridor.  PSYCHIATRIC: Patient is alert and oriented.  Mood and affect appear normal.  SKIN: Warm and dry.  No scalp, face, or neck lesions noted.     Physical Exam and Procedure     EARS: Normal shape and symmetry.  No tenderness when palpating the mastoid or tragal areas bilaterally.  The ears were then examined under the otic binocular microscope to perform cerumen removal.  Otoscopic exam on the right reveals a mostly clear canal with a slight amount of powder residue.  There is no significant debris or drainage. The right tympanic membrane was round, intact without evidence of effusion.  No retraction, granulation, drainage, or evidence of cholesteatoma.       Attention was turned to the left ear.  Otoscopic exam on the left reveals a mole amount of cerumen.  I removed the cerumen with an alligator forceps. The left tympanic membrane was round, intact without evidence of effusion.  No retraction, granulation, drainage, or evidence of cholesteatoma.     Assessment and Plan     ICD-10-CM    1. History of acute otitis externa  Z86.69 Remove Cerumen      It was my pleasure seeing Blanche Lyman today in clinic.  Patient appears to have resolution of her right fungal otitis externa.  I recommend observation at this point in time.  I will have her hold onto the Lotrimin drops but she can use them in the future if she begins to have symptoms.  The patient knows to contact me if she has drainage or plugging in the future I had be happy to see her.  We discussed water precautions for the ear.  The patient can return to clinic as needed.    Aniceto Ca MD  Department of Otolarygology-Head and Neck Surgery  [facility]         Again, thank you for allowing me to participate in the care of your patient.         Sincerely,        Aniceto Ca MD

## 2021-02-19 ENCOUNTER — MYC REFILL (OUTPATIENT)
Dept: FAMILY MEDICINE | Facility: CLINIC | Age: 26
End: 2021-02-19

## 2021-02-19 DIAGNOSIS — F41.9 ANXIETY: ICD-10-CM

## 2021-02-19 DIAGNOSIS — F32.1 CURRENT MODERATE EPISODE OF MAJOR DEPRESSIVE DISORDER WITHOUT PRIOR EPISODE (H): ICD-10-CM

## 2021-02-25 ENCOUNTER — MYC MEDICAL ADVICE (OUTPATIENT)
Dept: FAMILY MEDICINE | Facility: CLINIC | Age: 26
End: 2021-02-25

## 2021-03-17 ENCOUNTER — HOSPITAL ENCOUNTER (EMERGENCY)
Facility: CLINIC | Age: 26
Discharge: HOME | End: 2021-03-18
Attending: EMERGENCY MEDICINE | Admitting: EMERGENCY MEDICINE
Payer: COMMERCIAL

## 2021-03-17 DIAGNOSIS — H66.002 ACUTE SUPPURATIVE OTITIS MEDIA OF LEFT EAR WITHOUT SPONTANEOUS RUPTURE OF TYMPANIC MEMBRANE, RECURRENCE NOT SPECIFIED: ICD-10-CM

## 2021-03-17 PROCEDURE — 99283 EMERGENCY DEPT VISIT LOW MDM: CPT | Performed by: EMERGENCY MEDICINE

## 2021-03-17 PROCEDURE — 99284 EMERGENCY DEPT VISIT MOD MDM: CPT | Performed by: EMERGENCY MEDICINE

## 2021-03-17 ASSESSMENT — MIFFLIN-ST. JEOR: SCORE: 1866.73

## 2021-03-18 VITALS
OXYGEN SATURATION: 99 % | RESPIRATION RATE: 18 BRPM | TEMPERATURE: 98.4 F | HEIGHT: 67 IN | BODY MASS INDEX: 37.51 KG/M2 | WEIGHT: 239 LBS | DIASTOLIC BLOOD PRESSURE: 101 MMHG | HEART RATE: 80 BPM | SYSTOLIC BLOOD PRESSURE: 143 MMHG

## 2021-03-18 PROCEDURE — 250N000013 HC RX MED GY IP 250 OP 250 PS 637: Performed by: EMERGENCY MEDICINE

## 2021-03-18 RX ADMIN — IBUPROFEN 600 MG: 400 TABLET, FILM COATED ORAL at 00:09

## 2021-03-18 RX ADMIN — AMOXICILLIN AND CLAVULANATE POTASSIUM 1 TABLET: 875; 125 TABLET, FILM COATED ORAL at 00:09

## 2021-03-18 ASSESSMENT — ENCOUNTER SYMPTOMS
CONSTITUTIONAL NEGATIVE: 1
MUSCULOSKELETAL NEGATIVE: 1
FEVER: 0
GASTROINTESTINAL NEGATIVE: 1

## 2021-03-18 NOTE — ED PROVIDER NOTES
History     Chief Complaint   Patient presents with     Otalgia     Left ear pain started ~1 day ago. Took 3 81 mg ASA prior to arrival     HPI  Blanche Lyman is a 25 year old female who has past medical history significant for prior episode of fungal ear infection, now presenting to the emergency department with left ear pain.  Patient had previously had follow-up with ENT, in addition to primary care providers in January 2021 for initial treatment for otitis externa, followed by fungal infection of the right ear.  Patient now with 1 to 2-day history of increased amounts of left ear pain.  Has had muffled hearing.  No fever.  No sore throat.  No history of ear surgeries.  No other concerns.  No recent antibiotics within the last 1 month    Allergies:  Allergies   Allergen Reactions     Milk Protein Extract Other (See Comments)     Headaches, stomach aches       Peanuts [Nuts] Other (See Comments)     Headaches, stomach aches       Wheat Gluten [Gluten Meal] Other (See Comments)     Headaches, stomach aches         Problem List:    There are no active problems to display for this patient.       Past Medical History:    No past medical history on file.    Past Surgical History:    No past surgical history on file.    Family History:    Family History   Problem Relation Age of Onset     Heart Disease Maternal Grandfather      Cancer Paternal Grandfather      Heart Disease Paternal Grandfather        Social History:  Marital Status:  Single [1]  Social History     Tobacco Use     Smoking status: Never Smoker     Smokeless tobacco: Never Used   Substance Use Topics     Alcohol use: Yes     Comment: occasional     Drug use: Never        Medications:    amoxicillin-clavulanate (AUGMENTIN) 875-125 MG tablet  levonorgestrel-ethinyl estradiol (NORDETTE) 0.15-30 MG-MCG tablet  sertraline (ZOLOFT) 50 MG tablet          Review of Systems   Constitutional: Negative.  Negative for fever.   HENT: Positive for ear pain.   "  Gastrointestinal: Negative.    Musculoskeletal: Negative.        Physical Exam   BP: (!) 143/100  Pulse: 80  Temp: 98.4  F (36.9  C)  Resp: 18  Height: 170.2 cm (5' 7\")  Weight: 108.4 kg (239 lb)  SpO2: 99 %      Physical Exam  BP (!) 143/100   Pulse 80   Temp 98.4  F (36.9  C) (Oral)   Resp 18   Ht 1.702 m (5' 7\")   Wt 108.4 kg (239 lb)   SpO2 99%   BMI 37.43 kg/m    General: alert and in no acute distress  Head: atraumatic, normocephalic  Ears:  Right ear normal.    Left ear with some pain with movement of the pinna.  Patient does have mild diffuse swelling, with erythema of the external auditory canal, with tympanic membrane which is erythematous, bulging, with air-fluid level which is present.  Does have some slight whitish crusting of the external auditory canal as well.  Abd: nondistended  Musculoskel/Extremities: normal extremities, no apparent edema, and full AROM of major joints  Skin: no rashes, no diaphoresis and skin color normal  Neuro: Patient awake, alert, oriented, speech is fluent, gait is normal  Psychiatric: affect/mood normal, cooperative, normal judgement/insight and memory intact      ED Course        Procedures               Critical Care time:  none               No results found for this or any previous visit (from the past 24 hour(s)).    Medications   amoxicillin-clavulanate (AUGMENTIN) 875-125 MG per tablet 1 tablet (has no administration in time range)       Assessments & Plan (with Medical Decision Making)  25 year old female presenting to the emergency department with concerns regarding left ear pain.  I reviewed previous medical records, and patient has had prior external otitis of the right ear, treated initially with antibiotics, and antibiotic eardrops, subsequently followed up with ENT and culture that showed fungal external otitis.  This is since resolved of the right ear.  Patient now with left ear symptoms.  I discussed my concerns regarding external otitis, and " recommended antibiotic eardrops, however patient states that she was told previously that she should not be on eardrops.  Therefore, at minimum, I recommended that patient take oral antibiotics.  She will do so, and does have follow-up with ENT next week.  Encouraged to return if new, or worsening symptoms.  Also recommended over-the-counter Tylenol, and ibuprofen as needed for pain.     I have reviewed the nursing notes.    I have reviewed the findings, diagnosis, plan and need for follow up with the patient.       New Prescriptions    AMOXICILLIN-CLAVULANATE (AUGMENTIN) 875-125 MG TABLET    Take 1 tablet by mouth 2 times daily for 10 days       Final diagnoses:   Acute suppurative otitis media of left ear without spontaneous rupture of tympanic membrane, recurrence not specified       3/17/2021   [facility] EMERGENCY DEPT     ChatoAshish llamas MD  03/18/21 0009

## 2021-03-18 NOTE — ED TRIAGE NOTES
~ 24 hours of left ear pain. Hx of frequent ear infections. Took (3) 81 mg ASA prior to arrival for pain. Has an appointment with ENT next week.

## 2021-03-23 NOTE — PROGRESS NOTES
Chief Complaint   Patient presents with     Ear Problem     History of Present Illness  Blanche Lyman is a 26 year old female who presents today for follow-up.  I last saw the patient on 2/11/2021.  She previously had a right fungal otitis externa. She was treated and symptoms improved.  She develop symptoms on the left.  She was seen in the emergency department and placed on an oral antibiotic (Augmentin).  She returns today for follow up.    The patient reports recent onset of left-sided ear discomfort, plugging, drainage.  She is not had any problems with the right ear.  She is been careful about keeping water out of her ear.  No dizziness or vertigo.  No bloody otorrhea.  No previous history of ear surgery. No other ENT related concerns.    Past Medical History  There is no problem list on file for this patient.    Current Medications     Current Outpatient Medications:      amoxicillin-clavulanate (AUGMENTIN) 875-125 MG tablet, Take 1 tablet by mouth 2 times daily for 10 days, Disp: 20 tablet, Rfl: 0     dexamethasone (DECADRON) 0.1 % ophthalmic solution, Place 3 drops Into the left ear 2 times daily, Disp: 5 mL, Rfl: 1     levonorgestrel-ethinyl estradiol (NORDETTE) 0.15-30 MG-MCG tablet, , Disp: , Rfl:      sertraline (ZOLOFT) 50 MG tablet, Take 1 tablets (50 mg) by mouth daily, Disp: 90 tablet, Rfl: 0    Allergies  Allergies   Allergen Reactions     Milk Protein Extract Other (See Comments)     Headaches, stomach aches       Peanuts [Nuts] Other (See Comments)     Headaches, stomach aches       Wheat Gluten [Gluten Meal] Other (See Comments)     Headaches, stomach aches         Social History   Social History     Socioeconomic History     Marital status: Single     Spouse name: Not on file     Number of children: Not on file     Years of education: Not on file     Highest education level: Not on file   Occupational History     Not on file   Social Needs     Financial resource strain: Not on file     Food  insecurity     Worry: Not on file     Inability: Not on file     Transportation needs     Medical: Not on file     Non-medical: Not on file   Tobacco Use     Smoking status: Never Smoker     Smokeless tobacco: Never Used   Substance and Sexual Activity     Alcohol use: Yes     Comment: occasional     Drug use: Never     Sexual activity: Yes     Partners: Male     Birth control/protection: Pill   Lifestyle     Physical activity     Days per week: Not on file     Minutes per session: Not on file     Stress: Not on file   Relationships     Social connections     Talks on phone: Not on file     Gets together: Not on file     Attends Islam service: Not on file     Active member of club or organization: Not on file     Attends meetings of clubs or organizations: Not on file     Relationship status: Not on file     Intimate partner violence     Fear of current or ex partner: Not on file     Emotionally abused: Not on file     Physically abused: Not on file     Forced sexual activity: Not on file   Other Topics Concern     Parent/sibling w/ CABG, MI or angioplasty before 65F 55M? No   Social History Narrative     Not on file       Family History  Family History   Problem Relation Age of Onset     Heart Disease Maternal Grandfather      Cancer Paternal Grandfather      Heart Disease Paternal Grandfather        Review of Systems  As per HPI and PMHx, otherwise 10+ comprehensive system review is negative.    Physical Exam  BP (!) 152/102 (BP Location: Right arm, Patient Position: Sitting, Cuff Size: Adult Large)   Pulse 69   Temp 98  F (36.7  C) (Tympanic)   GENERAL: Patient is a pleasant, cooperative 26 year old female in no acute distress.  HEAD: Normocephalic, atraumatic.  Hair and scalp are normal.  EYES: Pupils are equal, round, reactive to light and accommodation.  Extraocular movements are intact.  The sclera nonicteric without injection.  The extraocular structures are normal.  EARS: See below.  NEUROLOGIC:  Cranial nerves II through XII are grossly intact.  Voice is strong.  Facial nerve examination incomplete due to the patient wearing a mask.  CARDIOVASCULAR: Extremities are warm and well-perfused.  No significant peripheral edema.  RESPIRATORY: Patient has nonlabored breathing without cough, wheeze, stridor.  PSYCHIATRIC: Patient is alert and oriented.  Mood and affect appear normal.  SKIN: Warm and dry.  No scalp, face, or neck lesions noted.    Physical Exam and Procedure    EARS: Normal shape and symmetry.  No tenderness when palpating the mastoid or tragal areas bilaterally.  The ears were then examined under the otic binocular microscope to perform cerumen removal.  Otoscopic exam on the right reveals clear canal.  The right tympanic membrane was round, intact without evidence of effusion.  No retraction, granulation, drainage, or evidence of cholesteatoma.      Attention was turned to the left ear.  Otoscopic exam on the left reveals moist ceruminous debris and some fungal spores impacted down to the level of the tympanic membrane.  The debris and cerumen were removed with a #5 and #7 suction.  There is some mucoid otorrhea present.  The left tympanic membrane was round and intact.  There is some granulation to the posterior portion of the tympanic membrane with some thickening.  There is no vianca evidence of middle ear effusion. No retraction or evidence of cholesteatoma.      Assessment and Plan     ICD-10-CM    1. Acute diffuse otitis externa of left ear  H60.312 Remove Cerumen     dexamethasone (DECADRON) 0.1 % ophthalmic solution   2. Conductive hearing loss of left ear with unrestricted hearing of right ear  H90.12 Remove Cerumen     dexamethasone (DECADRON) 0.1 % ophthalmic solution   3. Granuloma of tympanic membrane of left ear  H71.12      It was my pleasure seeing Blanche Lyman today in clinic.  The patient has a left ear otitis externa that appears fungal in origin.  I debrided the canal under  the microscope.  She has some Lotrimin drops at home.  We will have her start the Lotrimin in addition to some dexamethasone drops.  We discussed 3 drops of each twice daily until she sees me back next week.  I will see her on Monday for repeat examination. I discussed keeping the ear dry, and to not place anything in the ear except for the ear drops.     Blanche to follow up with Primary Care provider regarding elevated blood pressure.    Aniceto Ca MD  Department of Otolarygology-Head and Neck Surgery  [facility]

## 2021-03-24 ENCOUNTER — OFFICE VISIT (OUTPATIENT)
Dept: OTOLARYNGOLOGY | Facility: CLINIC | Age: 26
End: 2021-03-24
Payer: COMMERCIAL

## 2021-03-24 VITALS — DIASTOLIC BLOOD PRESSURE: 102 MMHG | SYSTOLIC BLOOD PRESSURE: 152 MMHG | TEMPERATURE: 98 F | HEART RATE: 69 BPM

## 2021-03-24 DIAGNOSIS — H71.12 GRANULOMA OF TYMPANIC MEMBRANE OF LEFT EAR: ICD-10-CM

## 2021-03-24 DIAGNOSIS — H90.12 CONDUCTIVE HEARING LOSS OF LEFT EAR WITH UNRESTRICTED HEARING OF RIGHT EAR: ICD-10-CM

## 2021-03-24 DIAGNOSIS — H60.312 ACUTE DIFFUSE OTITIS EXTERNA OF LEFT EAR: Primary | ICD-10-CM

## 2021-03-24 PROCEDURE — 92504 EAR MICROSCOPY EXAMINATION: CPT | Performed by: OTOLARYNGOLOGY

## 2021-03-24 PROCEDURE — 99213 OFFICE O/P EST LOW 20 MIN: CPT | Mod: 25 | Performed by: OTOLARYNGOLOGY

## 2021-03-24 RX ORDER — DEXAMETHASONE SODIUM PHOSPHATE 1 MG/ML
3 SOLUTION/ DROPS OPHTHALMIC 2 TIMES DAILY
Qty: 5 ML | Refills: 1 | Status: SHIPPED | OUTPATIENT
Start: 2021-03-24 | End: 2021-04-23

## 2021-03-24 NOTE — NURSING NOTE
"Initial BP (!) 152/102 (BP Location: Right arm, Patient Position: Sitting, Cuff Size: Adult Large)   Pulse 69   Temp 98  F (36.7  C) (Tympanic)  Estimated body mass index is 37.43 kg/m  as calculated from the following:    Height as of 3/17/21: 1.702 m (5' 7\").    Weight as of 3/17/21: 108.4 kg (239 lb). .    Nery Wilkes MA on 3/24/2021 at 3:55 PM    "

## 2021-03-24 NOTE — LETTER
3/24/2021         RE: Blanche Lyman  [address] [facility] [address]        Dear Colleague,    Thank you for referring your patient, Blanche Lyman, to the [facility]. Please see a copy of my visit note below.    Chief Complaint   Patient presents with     Ear Problem     History of Present Illness  Blanche Lyman is a 26 year old female who presents today for follow-up.  I last saw the patient on 2/11/2021.  She previously had a right fungal otitis externa. She was treated and symptoms improved.  She develop symptoms on the left.  She was seen in the emergency department and placed on an oral antibiotic (Augmentin).  She returns today for follow up.    The patient reports recent onset of left-sided ear discomfort, plugging, drainage.  She is not had any problems with the right ear.  She is been careful about keeping water out of her ear.  No dizziness or vertigo.  No bloody otorrhea.  No previous history of ear surgery. No other ENT related concerns.    Past Medical History  There is no problem list on file for this patient.    Current Medications     Current Outpatient Medications:      amoxicillin-clavulanate (AUGMENTIN) 875-125 MG tablet, Take 1 tablet by mouth 2 times daily for 10 days, Disp: 20 tablet, Rfl: 0     dexamethasone (DECADRON) 0.1 % ophthalmic solution, Place 3 drops Into the left ear 2 times daily, Disp: 5 mL, Rfl: 1     levonorgestrel-ethinyl estradiol (NORDETTE) 0.15-30 MG-MCG tablet, , Disp: , Rfl:      sertraline (ZOLOFT) 50 MG tablet, Take 1 tablets (50 mg) by mouth daily, Disp: 90 tablet, Rfl: 0    Allergies  Allergies   Allergen Reactions     Milk Protein Extract Other (See Comments)     Headaches, stomach aches       Peanuts [Nuts] Other (See Comments)     Headaches, stomach aches       Wheat Gluten [Gluten Meal] Other (See Comments)     Headaches, stomach aches         Social History   Social History     Socioeconomic History     Marital status:  Single     Spouse name: Not on file     Number of children: Not on file     Years of education: Not on file     Highest education level: Not on file   Occupational History     Not on file   Social Needs     Financial resource strain: Not on file     Food insecurity     Worry: Not on file     Inability: Not on file     Transportation needs     Medical: Not on file     Non-medical: Not on file   Tobacco Use     Smoking status: Never Smoker     Smokeless tobacco: Never Used   Substance and Sexual Activity     Alcohol use: Yes     Comment: occasional     Drug use: Never     Sexual activity: Yes     Partners: Male     Birth control/protection: Pill   Lifestyle     Physical activity     Days per week: Not on file     Minutes per session: Not on file     Stress: Not on file   Relationships     Social connections     Talks on phone: Not on file     Gets together: Not on file     Attends Gnosticist service: Not on file     Active member of club or organization: Not on file     Attends meetings of clubs or organizations: Not on file     Relationship status: Not on file     Intimate partner violence     Fear of current or ex partner: Not on file     Emotionally abused: Not on file     Physically abused: Not on file     Forced sexual activity: Not on file   Other Topics Concern     Parent/sibling w/ CABG, MI or angioplasty before 65F 55M? No   Social History Narrative     Not on file       Family History  Family History   Problem Relation Age of Onset     Heart Disease Maternal Grandfather      Cancer Paternal Grandfather      Heart Disease Paternal Grandfather        Review of Systems  As per HPI and PMHx, otherwise 10+ comprehensive system review is negative.    Physical Exam  BP (!) 152/102 (BP Location: Right arm, Patient Position: Sitting, Cuff Size: Adult Large)   Pulse 69   Temp 98  F (36.7  C) (Tympanic)   GENERAL: Patient is a pleasant, cooperative 26 year old female in no acute distress.  HEAD: Normocephalic,  atraumatic.  Hair and scalp are normal.  EYES: Pupils are equal, round, reactive to light and accommodation.  Extraocular movements are intact.  The sclera nonicteric without injection.  The extraocular structures are normal.  EARS: See below.  NEUROLOGIC: Cranial nerves II through XII are grossly intact.  Voice is strong.  Facial nerve examination incomplete due to the patient wearing a mask.  CARDIOVASCULAR: Extremities are warm and well-perfused.  No significant peripheral edema.  RESPIRATORY: Patient has nonlabored breathing without cough, wheeze, stridor.  PSYCHIATRIC: Patient is alert and oriented.  Mood and affect appear normal.  SKIN: Warm and dry.  No scalp, face, or neck lesions noted.    Physical Exam and Procedure    EARS: Normal shape and symmetry.  No tenderness when palpating the mastoid or tragal areas bilaterally.  The ears were then examined under the otic binocular microscope to perform cerumen removal.  Otoscopic exam on the right reveals clear canal.  The right tympanic membrane was round, intact without evidence of effusion.  No retraction, granulation, drainage, or evidence of cholesteatoma.      Attention was turned to the left ear.  Otoscopic exam on the left reveals moist ceruminous debris and some fungal spores impacted down to the level of the tympanic membrane.  The debris and cerumen were removed with a #5 and #7 suction.  There is some mucoid otorrhea present.  The left tympanic membrane was round and intact.  There is some granulation to the posterior portion of the tympanic membrane with some thickening.  There is no vianca evidence of middle ear effusion. No retraction or evidence of cholesteatoma.      Assessment and Plan     ICD-10-CM    1. Acute diffuse otitis externa of left ear  H60.312 Remove Cerumen     dexamethasone (DECADRON) 0.1 % ophthalmic solution   2. Conductive hearing loss of left ear with unrestricted hearing of right ear  H90.12 Remove Cerumen     dexamethasone  (DECADRON) 0.1 % ophthalmic solution   3. Granuloma of tympanic membrane of left ear  H71.12      It was my pleasure seeing Blanche Lyman today in clinic.  The patient has a left ear otitis externa that appears fungal in origin.  I debrided the canal under the microscope.  She has some Lotrimin drops at home.  We will have her start the Lotrimin in addition to some dexamethasone drops.  We discussed 3 drops of each twice daily until she sees me back next week.  I will see her on Monday for repeat examination. I discussed keeping the ear dry, and to not place anything in the ear except for the ear drops.     Blanche to follow up with Primary Care provider regarding elevated blood pressure.    Aniceto Ca MD  Department of Otolarygology-Head and Neck Surgery  [facility]         Again, thank you for allowing me to participate in the care of your patient.        Sincerely,        Aniceto Ca MD

## 2021-03-24 NOTE — PATIENT INSTRUCTIONS
Per physician instructions      If you have questions or concerns on any instructions given to you by your provider today or if you need to schedule an appointment, you can reach us at [phone].

## 2021-03-29 ENCOUNTER — OFFICE VISIT (OUTPATIENT)
Dept: OTOLARYNGOLOGY | Facility: CLINIC | Age: 26
End: 2021-03-29
Payer: COMMERCIAL

## 2021-03-29 VITALS
TEMPERATURE: 99 F | BODY MASS INDEX: 37.43 KG/M2 | WEIGHT: 239 LBS | HEART RATE: 75 BPM | SYSTOLIC BLOOD PRESSURE: 132 MMHG | DIASTOLIC BLOOD PRESSURE: 93 MMHG

## 2021-03-29 DIAGNOSIS — Z86.69 HISTORY OF ACUTE OTITIS EXTERNA: Primary | ICD-10-CM

## 2021-03-29 PROCEDURE — 99213 OFFICE O/P EST LOW 20 MIN: CPT | Mod: 25 | Performed by: OTOLARYNGOLOGY

## 2021-03-29 PROCEDURE — 92504 EAR MICROSCOPY EXAMINATION: CPT | Performed by: OTOLARYNGOLOGY

## 2021-03-29 ASSESSMENT — PAIN SCALES - GENERAL: PAINLEVEL: NO PAIN (0)

## 2021-03-29 NOTE — NURSING NOTE
"Initial BP (!) 132/93 (BP Location: Right arm, Patient Position: Chair, Cuff Size: Adult Large)   Pulse 75   Temp 99  F (37.2  C) (Tympanic)   Wt 108.4 kg (239 lb)   BMI 37.43 kg/m   Estimated body mass index is 37.43 kg/m  as calculated from the following:    Height as of 3/17/21: 1.702 m (5' 7\").    Weight as of this encounter: 108.4 kg (239 lb). .    Marina Michelle LPN    "

## 2021-03-29 NOTE — LETTER
3/29/2021         RE: Blanche Lyman  [address] [facility] [address]        Dear Colleague,    Thank you for referring your patient, Blanche Lyman, to the [facility]. Please see a copy of my visit note below.    Chief Complaint   Patient presents with     RECHECK     left otitis externa after using drops- patient states drainage and pain  less today     History of Present Illness  Blanche Lyman is a 26 year old female who presents today for follow-up.  I last saw the patient on 3/24/2021.  She previously had a right fungal otitis externa.  She then developed symptoms on the left.  I started treating her for left fungal otitis externa. She returns today for follow up.     The patient reports significant improvement on the left-hand side.  She is using the Lotrimin and the dexamethasone drops.  She feels like her ear is about 75% of normal.  She is still having some intermittent itching.  No dizziness or vertigo.  No bloody otorrhea.  No previous history of ear surgery. No other ENT related concerns.    Past Medical History  There is no problem list on file for this patient.    Current Medications    Current Outpatient Medications:      dexamethasone (DECADRON) 0.1 % ophthalmic solution, Place 3 drops Into the left ear 2 times daily, Disp: 5 mL, Rfl: 1     levonorgestrel-ethinyl estradiol (NORDETTE) 0.15-30 MG-MCG tablet, , Disp: , Rfl:      sertraline (ZOLOFT) 50 MG tablet, Take 1 tablets (50 mg) by mouth daily, Disp: 90 tablet, Rfl: 0    Allergies  Allergies   Allergen Reactions     Milk Protein Extract Other (See Comments)     Headaches, stomach aches       Peanuts [Nuts] Other (See Comments)     Headaches, stomach aches       Wheat Gluten [Gluten Meal] Other (See Comments)     Headaches, stomach aches         Social History  Social History     Socioeconomic History     Marital status: Single     Spouse name: Not on file     Number of children: Not on file     Years of  education: Not on file     Highest education level: Not on file   Occupational History     Not on file   Social Needs     Financial resource strain: Not on file     Food insecurity     Worry: Not on file     Inability: Not on file     Transportation needs     Medical: Not on file     Non-medical: Not on file   Tobacco Use     Smoking status: Never Smoker     Smokeless tobacco: Never Used   Substance and Sexual Activity     Alcohol use: Yes     Comment: occasional     Drug use: Never     Sexual activity: Yes     Partners: Male     Birth control/protection: Pill   Lifestyle     Physical activity     Days per week: Not on file     Minutes per session: Not on file     Stress: Not on file   Relationships     Social connections     Talks on phone: Not on file     Gets together: Not on file     Attends Confucianist service: Not on file     Active member of club or organization: Not on file     Attends meetings of clubs or organizations: Not on file     Relationship status: Not on file     Intimate partner violence     Fear of current or ex partner: Not on file     Emotionally abused: Not on file     Physically abused: Not on file     Forced sexual activity: Not on file   Other Topics Concern     Parent/sibling w/ CABG, MI or angioplasty before 65F 55M? No   Social History Narrative     Not on file       Family History  Family History   Problem Relation Age of Onset     Heart Disease Maternal Grandfather      Cancer Paternal Grandfather      Heart Disease Paternal Grandfather        Review of Systems  As per HPI and PMHx, otherwise 10 system review including the head and neck, constitutional, eyes, respiratory, GI, skin, neurologic, lymphatic, endocrine, and allergy systems is negative.    Physical Exam  BP (!) 132/93 (BP Location: Right arm, Patient Position: Chair, Cuff Size: Adult Large)   Pulse 75   Temp 99  F (37.2  C) (Tympanic)   Wt 108.4 kg (239 lb)   BMI 37.43 kg/m    GENERAL: Patient is a pleasant, cooperative 26  year old female in no acute distress.  HEAD: Normocephalic, atraumatic.  Hair and scalp are normal.  EYES: Pupils are equal, round, reactive to light and accommodation.  Extraocular movements are intact.  The sclera nonicteric without injection.  The extraocular structures are normal.  EARS: See below.  NEUROLOGIC: Cranial nerves II through XII are grossly intact.  Voice is strong.  Facial nerve examination incomplete due to the patient wearing a mask.  CARDIOVASCULAR: Extremities are warm and well-perfused.  No significant peripheral edema.  RESPIRATORY: Patient has nonlabored breathing without cough, wheeze, stridor.  PSYCHIATRIC: Patient is alert and oriented.  Mood and affect appear normal.  SKIN: Warm and dry.  No scalp, face, or neck lesions noted.     Physical Exam and Procedure     EARS: Normal shape and symmetry.  No tenderness when palpating the mastoid or tragal areas bilaterally.  The ears were then examined under the otic binocular microscope to perform cerumen removal.  Otoscopic exam on the right reveals clear canal.  The right tympanic membrane was round, intact without evidence of effusion.  No retraction, granulation, drainage, or evidence of cholesteatoma.       Attention was turned to the left ear.  Otoscopic exam on the left reveals moist slight moist ceruminous debris down to the level of the tympanic membrane.  The debris and cerumen were removed with a #5 and #7 suction.  The left tympanic membrane was round and intact.  There is some granulation to the posterior portion of the tympanic membrane with some thickening.  There is no vianca evidence of middle ear effusion. No retraction or evidence of cholesteatoma.       Assessment and Plan     ICD-10-CM    1. History of acute otitis externa  Z86.69       It was my pleasure seeing Blanche Colvinson today in clinic.  The patient appears to have resolution of her left fungal otitis externa.  I recommend observation at this point in time.  I will  have her hold onto the Lotrimin drops but she can use them in the future if she begins to have symptoms.  The patient knows to contact me if she has drainage or plugging in the future I had be happy to see her.  We discussed water precautions for the ear.  The patient can return to clinic as needed.    Blanche to follow up with Primary Care provider regarding elevated blood pressure.    Aniceto Ca MD  Department of Otolarygology-Head and Neck Surgery  [facility]         Again, thank you for allowing me to participate in the care of your patient.        Sincerely,        Aniceto Ca MD

## 2021-03-29 NOTE — PROGRESS NOTES
Chief Complaint   Patient presents with     RECHECK     left otitis externa after using drops- patient states drainage and pain  less today     History of Present Illness  Blanche Lyman is a 26 year old female who presents today for follow-up.  I last saw the patient on 3/24/2021.  She previously had a right fungal otitis externa.  She then developed symptoms on the left.  I started treating her for left fungal otitis externa. She returns today for follow up.     The patient reports significant improvement on the left-hand side.  She is using the Lotrimin and the dexamethasone drops.  She feels like her ear is about 75% of normal.  She is still having some intermittent itching.  No dizziness or vertigo.  No bloody otorrhea.  No previous history of ear surgery. No other ENT related concerns.    Past Medical History  There is no problem list on file for this patient.    Current Medications    Current Outpatient Medications:      dexamethasone (DECADRON) 0.1 % ophthalmic solution, Place 3 drops Into the left ear 2 times daily, Disp: 5 mL, Rfl: 1     levonorgestrel-ethinyl estradiol (NORDETTE) 0.15-30 MG-MCG tablet, , Disp: , Rfl:      sertraline (ZOLOFT) 50 MG tablet, Take 1 tablets (50 mg) by mouth daily, Disp: 90 tablet, Rfl: 0    Allergies  Allergies   Allergen Reactions     Milk Protein Extract Other (See Comments)     Headaches, stomach aches       Peanuts [Nuts] Other (See Comments)     Headaches, stomach aches       Wheat Gluten [Gluten Meal] Other (See Comments)     Headaches, stomach aches         Social History  Social History     Socioeconomic History     Marital status: Single     Spouse name: Not on file     Number of children: Not on file     Years of education: Not on file     Highest education level: Not on file   Occupational History     Not on file   Social Needs     Financial resource strain: Not on file     Food insecurity     Worry: Not on file     Inability: Not on file     Transportation needs      Medical: Not on file     Non-medical: Not on file   Tobacco Use     Smoking status: Never Smoker     Smokeless tobacco: Never Used   Substance and Sexual Activity     Alcohol use: Yes     Comment: occasional     Drug use: Never     Sexual activity: Yes     Partners: Male     Birth control/protection: Pill   Lifestyle     Physical activity     Days per week: Not on file     Minutes per session: Not on file     Stress: Not on file   Relationships     Social connections     Talks on phone: Not on file     Gets together: Not on file     Attends Denominational service: Not on file     Active member of club or organization: Not on file     Attends meetings of clubs or organizations: Not on file     Relationship status: Not on file     Intimate partner violence     Fear of current or ex partner: Not on file     Emotionally abused: Not on file     Physically abused: Not on file     Forced sexual activity: Not on file   Other Topics Concern     Parent/sibling w/ CABG, MI or angioplasty before 65F 55M? No   Social History Narrative     Not on file       Family History  Family History   Problem Relation Age of Onset     Heart Disease Maternal Grandfather      Cancer Paternal Grandfather      Heart Disease Paternal Grandfather        Review of Systems  As per HPI and PMHx, otherwise 10 system review including the head and neck, constitutional, eyes, respiratory, GI, skin, neurologic, lymphatic, endocrine, and allergy systems is negative.    Physical Exam  BP (!) 132/93 (BP Location: Right arm, Patient Position: Chair, Cuff Size: Adult Large)   Pulse 75   Temp 99  F (37.2  C) (Tympanic)   Wt 108.4 kg (239 lb)   BMI 37.43 kg/m    GENERAL: Patient is a pleasant, cooperative 26 year old female in no acute distress.  HEAD: Normocephalic, atraumatic.  Hair and scalp are normal.  EYES: Pupils are equal, round, reactive to light and accommodation.  Extraocular movements are intact.  The sclera nonicteric without injection.  The  extraocular structures are normal.  EARS: See below.  NEUROLOGIC: Cranial nerves II through XII are grossly intact.  Voice is strong.  Facial nerve examination incomplete due to the patient wearing a mask.  CARDIOVASCULAR: Extremities are warm and well-perfused.  No significant peripheral edema.  RESPIRATORY: Patient has nonlabored breathing without cough, wheeze, stridor.  PSYCHIATRIC: Patient is alert and oriented.  Mood and affect appear normal.  SKIN: Warm and dry.  No scalp, face, or neck lesions noted.     Physical Exam and Procedure     EARS: Normal shape and symmetry.  No tenderness when palpating the mastoid or tragal areas bilaterally.  The ears were then examined under the otic binocular microscope to perform cerumen removal.  Otoscopic exam on the right reveals clear canal.  The right tympanic membrane was round, intact without evidence of effusion.  No retraction, granulation, drainage, or evidence of cholesteatoma.       Attention was turned to the left ear.  Otoscopic exam on the left reveals moist slight moist ceruminous debris down to the level of the tympanic membrane.  The debris and cerumen were removed with a #5 and #7 suction.  The left tympanic membrane was round and intact.  There is some granulation to the posterior portion of the tympanic membrane with some thickening.  There is no vianca evidence of middle ear effusion. No retraction or evidence of cholesteatoma.       Assessment and Plan     ICD-10-CM    1. History of acute otitis externa  Z86.69       It was my pleasure seeing Blanche Lyman today in clinic.  The patient appears to have resolution of her left fungal otitis externa.  I recommend observation at this point in time.  I will have her hold onto the Lotrimin drops but she can use them in the future if she begins to have symptoms.  The patient knows to contact me if she has drainage or plugging in the future I had be happy to see her.  We discussed water precautions for the  ear.  The patient can return to clinic as needed.    Blanche to follow up with Primary Care provider regarding elevated blood pressure.    Aniceto Ca MD  Department of Otolarygology-Head and Neck Surgery  [facility]

## 2021-03-31 ENCOUNTER — MYC MEDICAL ADVICE (OUTPATIENT)
Dept: FAMILY MEDICINE | Facility: CLINIC | Age: 26
End: 2021-03-31

## 2021-04-09 ENCOUNTER — HOSPITAL ENCOUNTER (EMERGENCY)
Facility: CLINIC | Age: 26
Discharge: HOME | End: 2021-04-09
Attending: FAMILY MEDICINE | Admitting: FAMILY MEDICINE
Payer: COMMERCIAL

## 2021-04-09 VITALS
WEIGHT: 230 LBS | OXYGEN SATURATION: 97 % | HEIGHT: 67 IN | HEART RATE: 82 BPM | TEMPERATURE: 98 F | SYSTOLIC BLOOD PRESSURE: 150 MMHG | BODY MASS INDEX: 36.1 KG/M2 | DIASTOLIC BLOOD PRESSURE: 100 MMHG

## 2021-04-09 DIAGNOSIS — F41.9 ANXIETY: ICD-10-CM

## 2021-04-09 DIAGNOSIS — F41.8 DEPRESSION WITH ANXIETY: ICD-10-CM

## 2021-04-09 DIAGNOSIS — F32.1 CURRENT MODERATE EPISODE OF MAJOR DEPRESSIVE DISORDER WITHOUT PRIOR EPISODE (H): ICD-10-CM

## 2021-04-09 PROCEDURE — 99284 EMERGENCY DEPT VISIT MOD MDM: CPT | Performed by: FAMILY MEDICINE

## 2021-04-09 PROCEDURE — 99285 EMERGENCY DEPT VISIT HI MDM: CPT | Mod: 25 | Performed by: FAMILY MEDICINE

## 2021-04-09 PROCEDURE — 90791 PSYCH DIAGNOSTIC EVALUATION: CPT

## 2021-04-09 RX ORDER — SERTRALINE HYDROCHLORIDE 100 MG/1
TABLET, FILM COATED ORAL
Qty: 90 TABLET | Refills: 0 | Status: SHIPPED | OUTPATIENT
Start: 2021-04-09 | End: 2021-04-23

## 2021-04-09 ASSESSMENT — MIFFLIN-ST. JEOR: SCORE: 1820.9

## 2021-04-09 NOTE — ED NOTES
"Patient reports increased depression over last month. No changes to medications or life situations. Patient believes her current medications are not effective anymore. Patient had healed cuts to her left forearm. Patient reports they ~ 1 month old. Feels like she wants to cut for \"release\" but no intent to kill herself. Denies hx of prior SA.  "

## 2021-04-09 NOTE — ED PROVIDER NOTES
HPI   The patient is a 26-year-old female presenting by private car for worsened depression.  She has a known history of depression and anxiety.  She has been taking Zoloft for approximately 8 weeks.  She was on a small dose for the first week and then increased her dose over the past 7 weeks.  She tells me that she was improved for about a month but then began to worsen 4 weeks ago.  She simply tells me that her depression is worse.  She feels down and tearful.  She has thoughts of cutting herself.  The last time she did that was about a month ago.  She is not suicidal.  She is not homicidal.  She believes her depression is related to the Covid 19 pandemic and relative isolation.  She does live with her boyfriend and she says that that relationship is healthy.  She denies financial burdens.  She denies other relational problems.  She denies symptoms of infection.  No headache.  No chest pain or trouble breathing.  No urinary symptoms.  No vaginal symptoms.  No diarrhea.  No recent weight loss or reported weight gain.        Allergies:  Allergies   Allergen Reactions     Milk Protein Extract Other (See Comments)     Headaches, stomach aches       Peanuts [Nuts] Other (See Comments)     Headaches, stomach aches       Wheat Gluten [Gluten Meal] Other (See Comments)     Headaches, stomach aches       Problem List:    There are no active problems to display for this patient.     Past Medical History:    No past medical history on file.  Past Surgical History:    No past surgical history on file.  Family History:    Family History   Problem Relation Age of Onset     Heart Disease Maternal Grandfather      Cancer Paternal Grandfather      Heart Disease Paternal Grandfather      Social History:  Marital Status:  Single [1]  Social History     Tobacco Use     Smoking status: Never Smoker     Smokeless tobacco: Never Used   Substance Use Topics     Alcohol use: Yes     Comment: occasional     Drug use: Never     "  Medications:    sertraline (ZOLOFT) 100 MG tablet  dexamethasone (DECADRON) 0.1 % ophthalmic solution  levonorgestrel-ethinyl estradiol (NORDETTE) 0.15-30 MG-MCG tablet      Review of Systems   All other systems reviewed and are negative.      PE   BP: (!) 158/107  Pulse: 74  Temp: 98  F (36.7  C)  Height: 170.2 cm (5' 7\")  Weight: 104.3 kg (230 lb)  SpO2: 98 %  Physical Exam  Vitals signs reviewed.   Constitutional:       General: She is not in acute distress.     Appearance: She is well-developed.   HENT:      Head: Normocephalic and atraumatic.      Right Ear: External ear normal.      Left Ear: External ear normal.      Nose: Nose normal.      Mouth/Throat:      Mouth: Mucous membranes are moist.      Pharynx: Oropharynx is clear.   Eyes:      Extraocular Movements: Extraocular movements intact.      Conjunctiva/sclera: Conjunctivae normal.      Pupils: Pupils are equal, round, and reactive to light.   Neck:      Musculoskeletal: Normal range of motion.   Cardiovascular:      Rate and Rhythm: Normal rate and regular rhythm.   Pulmonary:      Effort: Pulmonary effort is normal.   Musculoskeletal: Normal range of motion.   Skin:     General: Skin is warm and dry.   Neurological:      Mental Status: She is alert and oriented to person, place, and time.   Psychiatric:      Comments: Tearful when talking about her depression.         ED COURSE and [facility]   1720.  The patient has increasing depression over the past month.  She is not suicidal or homicidal.  She is looking to increase her Zoloft.  She does not see a therapist or counselor on a regular basis.  I am asking that she be assessed by DEC.    1912.  Recommendations provided.  No emergent need for hospitalization.  I will increase her Zoloft from 50 mg daily to 100 mg daily.  Follow-up as scheduled.  Expect a phone call this weekend for follow-up as well.    LABS  Labs Ordered and Resulted from Time of ED Arrival Up to the Time of Departure from the ED - No " data to display    IMAGING  Images reviewed by me.  Radiology report also reviewed.  No orders to display       Procedures    Medications - No data to display      IMPRESSION       ICD-10-CM    1. Depression with anxiety  F41.8    2. Current moderate episode of major depressive disorder without prior episode (H)  F32.1 sertraline (ZOLOFT) 100 MG tablet   3. Anxiety  F41.9 sertraline (ZOLOFT) 100 MG tablet            Medication List      Modified    sertraline 100 MG tablet  Commonly known as: ZOLOFT  Take 1 tablets (50 mg) by mouth daily  What changed: medication strength                          Reinier Gutierrez MD  04/09/21 [id number]

## 2021-04-10 NOTE — DISCHARGE INSTRUCTIONS
Return to the Emergency Room if the following occurs:     Severely worsened anxiety or depression concern for harm to self or others, or for any concern at anytime.    Or, follow-up with the following provider as we discussed:     Expect a follow-up phone call this weekend from the DEC service.  Follow-up with the appointment is scheduled for later this April.  See your primary doctor either this next week or the following.    Medications discussed:    Increase your Zoloft from 50 mg daily to 100 mg daily.    If you received pain-relieving or sedating medication during your time in the ER, avoid alcohol, driving automobiles, or working with machinery.  Also, a responsible adult must stay with you.        Call the Nurse Advice Line at [phone] or [phone] for any concern at anytime.

## 2021-04-17 ENCOUNTER — HEALTH MAINTENANCE LETTER (OUTPATIENT)
Age: 26
End: 2021-04-17

## 2021-04-23 ENCOUNTER — VIRTUAL VISIT (OUTPATIENT)
Dept: FAMILY MEDICINE | Facility: CLINIC | Age: 26
End: 2021-04-23
Payer: COMMERCIAL

## 2021-04-23 DIAGNOSIS — R03.0 ELEVATED BLOOD PRESSURE READING WITHOUT DIAGNOSIS OF HYPERTENSION: ICD-10-CM

## 2021-04-23 DIAGNOSIS — F41.9 ANXIETY: ICD-10-CM

## 2021-04-23 DIAGNOSIS — F32.1 CURRENT MODERATE EPISODE OF MAJOR DEPRESSIVE DISORDER WITHOUT PRIOR EPISODE (H): Primary | ICD-10-CM

## 2021-04-23 PROCEDURE — 99213 OFFICE O/P EST LOW 20 MIN: CPT | Mod: GT | Performed by: FAMILY MEDICINE

## 2021-04-23 RX ORDER — SERTRALINE HYDROCHLORIDE 100 MG/1
TABLET, FILM COATED ORAL
Qty: 90 TABLET | Refills: 1 | Status: SHIPPED | OUTPATIENT
Start: 2021-04-23 | End: 2021-08-02

## 2021-04-23 SDOH — HEALTH STABILITY: MENTAL HEALTH: HOW OFTEN DO YOU HAVE A DRINK CONTAINING ALCOHOL?: MONTHLY OR LESS

## 2021-04-23 SDOH — HEALTH STABILITY: MENTAL HEALTH: HOW MANY STANDARD DRINKS CONTAINING ALCOHOL DO YOU HAVE ON A TYPICAL DAY?: NOT ASKED

## 2021-04-23 SDOH — HEALTH STABILITY: MENTAL HEALTH: HOW OFTEN DO YOU HAVE 6 OR MORE DRINKS ON ONE OCCASION?: NOT ASKED

## 2021-04-23 ASSESSMENT — ANXIETY QUESTIONNAIRES
5. BEING SO RESTLESS THAT IT IS HARD TO SIT STILL: NOT AT ALL
7. FEELING AFRAID AS IF SOMETHING AWFUL MIGHT HAPPEN: NOT AT ALL
GAD7 TOTAL SCORE: 1
IF YOU CHECKED OFF ANY PROBLEMS ON THIS QUESTIONNAIRE, HOW DIFFICULT HAVE THESE PROBLEMS MADE IT FOR YOU TO DO YOUR WORK, TAKE CARE OF THINGS AT HOME, OR GET ALONG WITH OTHER PEOPLE: NOT DIFFICULT AT ALL
6. BECOMING EASILY ANNOYED OR IRRITABLE: NOT AT ALL
1. FEELING NERVOUS, ANXIOUS, OR ON EDGE: SEVERAL DAYS
2. NOT BEING ABLE TO STOP OR CONTROL WORRYING: NOT AT ALL
3. WORRYING TOO MUCH ABOUT DIFFERENT THINGS: NOT AT ALL

## 2021-04-23 ASSESSMENT — PATIENT HEALTH QUESTIONNAIRE - PHQ9
SUM OF ALL RESPONSES TO PHQ QUESTIONS 1-9: 5
5. POOR APPETITE OR OVEREATING: NOT AT ALL

## 2021-04-23 NOTE — LETTER
My Depression Action Plan  Name: Blanche Lyman   Date of Birth 1995  Date: 4/23/2021    My doctor: Wilder Whitfield   My clinic: [facility]  [address] [facility] [address]  [phone]          GREEN    ZONE   Good Control    What it looks like:     Things are going generally well. You have normal ups and downs. You may even feel depressed from time to time, but bad moods usually last less than a day.   What you need to do:  1. Continue to care for yourself (see self care plan)  2. Check your depression survival kit and update it as needed  3. Follow your physician s recommendations including any medication.  4. Do not stop taking medication unless you consult with your physician first.           YELLOW         ZONE Getting Worse    What it looks like:     Depression is starting to interfere with your life.     It may be hard to get out of bed; you may be starting to isolate yourself from others.    Symptoms of depression are starting to last most all day and this has happened for several days.     You may have suicidal thoughts but they are not constant.   What you need to do:     1. Call your care team. Your response to treatment will improve if you keep your care team informed of your progress. Yellow periods are signs an adjustment may need to be made.     2. Continue your self-care.  Just get dressed and ready for the day.  Don't give yourself time to talk yourself out of it.    3. Talk to someone in your support network.    4. Open up your Depression Self-Care Plan/Wellness Kit.           RED    ZONE Medical Alert - Get Help    What it looks like:     Depression is seriously interfering with your life.     You may experience these or other symptoms: You can t get out of bed most days, can t work or engage in other necessary activities, you have trouble taking care of basic hygiene, or basic responsibilities, thoughts of suicide or death that will  not go away, self-injurious behavior.     What you need to do:  1. Call your care team and request a same-day appointment. If they are not available (weekends or after hours) call your local crisis line, emergency room or 911.          Depression Self-Care Plan / Wellness Kit    Many people find that medication and therapy are helpful treatments for managing depression. In addition, making small changes to your everyday life can help to boost your mood and improve your wellbeing. Below are some tips for you to consider. Be sure to talk with your medical provider and/or behavioral health consultant if your symptoms are worsening or not improving.     Sleep   Sleep hygiene  means all of the habits that support good, restful sleep. It includes maintaining a consistent bedtime and wake time, using your bedroom only for sleeping or sex, and keeping the bedroom dark and free of distractions like a computer, smartphone, or television.     Develop a Healthy Routine  Maintain good hygiene. Get out of bed in the morning, make your bed, brush your teeth, take a shower, and get dressed. Don t spend too much time viewing media that makes you feel stressed. Find time to relax each day.    Exercise  Get some form of exercise every day. This will help reduce pain and release endorphins, the  feel good  chemicals in your brain. It can be as simple as just going for a walk or doing some gardening, anything that will get you moving.      Diet  Strive to eat healthy foods, including fruits and vegetables. Drink plenty of water. Avoid excessive sugar, caffeine, alcohol, and other mood-altering substances.     Stay Connected with Others  Stay in touch with friends and family members.    Manage Your Mood  Try deep breathing, massage therapy, biofeedback, or meditation. Take part in fun activities when you can. Try to find something to smile about each day.     Psychotherapy  Be open to working with a therapist if your provider recommends  it.     Medication  Be sure to take your medication as prescribed. Most anti-depressants need to be taken every day. It usually takes several weeks for medications to work. Not all medicines work for all people. It is important to follow-up with your provider to make sure you have a treatment plan that is working for you. Do not stop your medication abruptly without first discussing it with your provider.    Crisis Resources   These hotlines are for both adults and children. They and are open 24 hours a day, 7 days a week unless noted otherwise.      National Suicide Prevention Lifeline   4-674-878-TALK (0000)      Crisis Text Line    www.crisistextline.org  Text HOME to 443044 from anywhere in the [place], anytime, about any type of crisis. A live, trained crisis counselor will receive the text and respond quickly.      [facility] Lifeline for LGBTQ Youth  A national crisis intervention and suicide lifeline for LGBTQ youth under 25. Provides a safe place to talk without judgement. Call 1-524.695.7487; text START to 223717 or visit www.thetrevorproject.org to talk to a trained counselor.      For [facility] crisis numbers, visit the [facility] website at:  https://[place].gov/dhs/people-we-serve/adults/health-care/mental-health/resources/crisis-contacts.jsp

## 2021-04-23 NOTE — PATIENT INSTRUCTIONS
Schedule a free nurse BP recheck within a month.  If BP is still out of goal after that we can plan appt to discuss start blood pressure medication.     Sertraline 100mg daily, new prescription will be the 100mg pill when you need it.     Patient Education     Controlling High Blood Pressure   High blood pressure (hypertension) is often called the silent killer. This is because many people who have it, don t know it. It can be very dangerous. High blood pressure can raise your risk of heart attack, stroke, heart disease, and heart failure. Controlling your blood pressure can decrease your risk of these problems. It's important to know the appropriate blood pressure range and remember to check your blood pressure regularly. Doing so can save your life.   Blood pressure measurements are given as 2 numbers. Systolic blood pressure is the upper number. This is the pressure when the heart contracts. Diastolic blood pressure is the lower number. This is the pressure when the heart relaxes between beats.   Blood pressure is categorized as normal, elevated, or stage 1 or stage 2 high blood pressure:     Normal blood pressure is systolic of less than 120 and diastolic of less than 80 (120/80)    Elevated blood pressure is systolic of 120 to 129 and diastolic less than 80    Stage 1 high blood pressure is systolic of 130 to 139 or diastolic between 80 to 89    Stage 2 high blood pressure is when systolic is 140 or higher or the diastolic is 90 or higher  A heart-healthy lifestyle can help you control your blood pressure without medicines. Here are some things you can do to pursue a heart-healthy lifestyle:     Choose heart-healthy foods     Select low-salt, low-fat foods. Limit sodium intake to 2,400 mg per day or the amount suggested by your healthcare provider.    Limit canned, dried, cured, packaged, and fast foods. These can contain a lot of salt.    Eat 8 to 10 servings of fruits and vegetables every day.    Choose lean  meats, fish, or chicken.    Eat whole-grain pasta, brown rice, and beans.    Eat 2 to 3 servings of low-fat or fat-free dairy products.    Ask your doctor about the DASH eating plan. This plan helps reduce blood pressure.    When you go to a restaurant, ask that your meal be prepared with no added salt.    Stay at a healthy weight     Ask your healthcare provider how many calories to eat a day. Then stick to that number.    Ask your healthcare provider what weight range is healthiest for you. If you are overweight, a weight loss of only 3% to 5% of your body weight can help lower blood pressure. Generally, a good weight loss goal is to lose 10% of your body weight in a year.    Limit snacks and sweets.    Get regular exercise.    Get up and get active     Find activities you enjoy that can be done alone or with friends or family. Such activities might include bicycling, dancing, walking, or jogging.    Park farther away from building entrances to walk more.    Use stairs instead of the elevator.    When you can, walk or bike instead of driving.    [place] leaves, garden, or do household repairs.    Be active at a moderate to vigorous level of physical activity for at least 40 minutes for a minimum of 3 to 4 days a week.     Manage stress     Make time to relax and enjoy life. Find time to laugh.    Communicate your concerns with your loved ones and your healthcare provider.    Visit with family and friends, and keep up with hobbies.    Limit alcohol and quit smoking     Men should have no more than 2 drinks per day.    Women should have no more than 1 drink per day.    Talk with your healthcare provider about quitting smoking. Smoking significantly increases your risk for heart disease and stroke. Ask your healthcare provider about community smoking cessation programs and other options.    Medicines  If lifestyle changes aren t enough, your healthcare provider may prescribe high blood pressure medicine. Take all  medicines as prescribed. If you have any questions about your medicines, ask your healthcare provider before stopping or changing them.   [employer] last reviewed this educational content on 6/1/2019 2000-2021 [employer]. All rights reserved. This information is not intended as a substitute for professional medical care. Always follow your healthcare professional's instructions.

## 2021-04-23 NOTE — PROGRESS NOTES
"Blanche is a 26 year old who is being evaluated via a billable video visit.      How would you like to obtain your AVS? MyChart  If the video visit is dropped, the invitation should be resent by: Text to cell phone: [phone]  Will anyone else be joining your video visit? No    Video Start Time: 12:59 PM    Assessment & Plan     Current moderate episode of major depressive disorder without prior episode (H)  Improving  Starting therapy next week  Increase of zoloft going well, continue 100mg daily  - DEPRESSION ACTION PLAN (DAP)  - sertraline (ZOLOFT) 100 MG tablet; Take 1 tablets 100 MG by mouth daily    Anxiety  Improving  Starting therapy next week  Increase of zoloft going well, continue 100mg daily.  - DEPRESSION ACTION PLAN (DAP)  - sertraline (ZOLOFT) 100 MG tablet; Take 1 tablets 100 MG by mouth daily    Elevated blood pressure reading without diagnosis of hypertension  Few high BPs but recent was in ER.  Plan RN BP recheck and if still high plan in clinic appt to discuss BP medication and labs.         BMI:   Estimated body mass index is 36.02 kg/m  as calculated from the following:    Height as of 4/9/21: 1.702 m (5' 7\").    Weight as of 4/9/21: 104.3 kg (230 lb).   Weight management plan: Discussed healthy diet and exercise guidelines    See Patient Instructions    No follow-ups on file.    Wilder Whitfield MD  [facility]    Rajesh Mancia is a 26 year old who presents for the following health issues     HPI     ED/UC Followup:    Facility:  [facility] ED  Date of visit: 4/9/2021  Reason for visit: Depression  Current Status: Patient states they increased her Zoloft to 100 MFG daily. Patient states she has been taking that for about two week and is noticing a good difference.       Depression and Anxiety Follow-Up    How are you doing with your depression since your last visit? Improved with increased medication.    How are you doing with your anxiety since " your last visit?  Improved getting better.    Are you having other symptoms that might be associated with depression or anxiety? No    Have you had a significant life event? No     Do you have any concerns with your use of alcohol or other drugs? No  Getting a little bit better.  Therapy; did get set up for therapy.    Working at [employer].    Currently quarantine after testing positive for COVID, tomorrow last day.    BP Readings from Last 3 Encounters:   04/09/21 (!) 150/100   03/29/21 (!) 132/93   03/24/21 (!) 152/102         Social History     Tobacco Use     Smoking status: Never Smoker     Smokeless tobacco: Never Used   Substance Use Topics     Alcohol use: Yes     Comment: occasional     Drug use: Never     PHQ 11/27/2020 1/27/2021 4/23/2021   PHQ-9 Total Score 16 2 5   Q9: Thoughts of better off dead/self-harm past 2 weeks Several days Not at all Not at all   F/U: Thoughts of suicide or self-harm Yes - -   F/U: Self harm-plan Yes - -   F/U: Self-harm action No - -   F/U: Safety concerns No - -     MIGUEL-7 SCORE 1/27/2021 4/23/2021   Total Score 0 (minimal anxiety) -   Total Score 0 1     Last PHQ-9 4/23/2021   1.  Little interest or pleasure in doing things 1   2.  Feeling down, depressed, or hopeless 1   3.  Trouble falling or staying asleep, or sleeping too much 0   4.  Feeling tired or having little energy 1   5.  Poor appetite or overeating 0   6.  Feeling bad about yourself 1   7.  Trouble concentrating 1   8.  Moving slowly or restless 0   Q9: Thoughts of better off dead/self-harm past 2 weeks 0   PHQ-9 Total Score 5   Difficulty at work, home, or with people Somewhat difficult   In the past two weeks have you had thoughts of suicide or self harm? -   Do you have concerns about your personal safety or the safety of others? -   In the past 2 weeks have you thought about a plan or had intention to harm yourself? -   In the past 2 weeks have you acted on these thoughts in any way? -     MIGUEL-7   4/23/2021   1. Feeling nervous, anxious, or on edge 1   2. Not being able to stop or control worrying 0   3. Worrying too much about different things 0   4. Trouble relaxing 0   5. Being so restless that it is hard to sit still 0   6. Becoming easily annoyed or irritable 0   7. Feeling afraid, as if something awful might happen 0   MIGUEL-7 Total Score 1   If you checked any problems, how difficult have they made it for you to do your work, take care of things at home, or get along with other people? Not difficult at all       Suicide Assessment Five-step Evaluation and Treatment (SAFE-T)    How many servings of fruits and vegetables do you eat daily?  2-3    On average, how many sweetened beverages do you drink each day (Examples: soda, juice, sweet tea, etc.  Do NOT count diet or artificially sweetened beverages)?   0    How many days per week do you exercise enough to make your heart beat faster? 3 or less    How many minutes a day do you exercise enough to make your heart beat faster? 20 - 29    How many days per week do you miss taking your medication? 0      Review of Systems   Constitutional, HEENT, cardiovascular, pulmonary, gi and gu systems are negative, except as otherwise noted.      Objective           Vitals:  No vitals were obtained today due to virtual visit.    Physical Exam   GENERAL: Healthy, alert and no distress  EYES: Eyes grossly normal to inspection.  No discharge or erythema, or obvious scleral/conjunctival abnormalities.  RESP: No audible wheeze, cough, or visible cyanosis.  No visible retractions or increased work of breathing.    NEURO: Cranial nerves grossly intact.  Mentation and speech appropriate for age.  PSYCH: Mentation appears normal, affect normal/bright, judgement and insight intact, normal speech and appearance well-groomed.            Video-Visit Details    Type of service:  Video Visit    Video End Time:1:15 PM    Originating Location (pt. Location): Home    Distant Location  (provider location):  [facility]     Platform used for Video Visit: [employer]

## 2021-04-24 ASSESSMENT — ANXIETY QUESTIONNAIRES: GAD7 TOTAL SCORE: 1

## 2021-05-19 ENCOUNTER — OFFICE VISIT (OUTPATIENT)
Dept: FAMILY MEDICINE | Facility: CLINIC | Age: 26
End: 2021-05-19
Payer: COMMERCIAL

## 2021-05-19 ENCOUNTER — ANCILLARY PROCEDURE (OUTPATIENT)
Dept: GENERAL RADIOLOGY | Facility: CLINIC | Age: 26
End: 2021-05-19
Attending: NURSE PRACTITIONER
Payer: COMMERCIAL

## 2021-05-19 VITALS
BODY MASS INDEX: 39.28 KG/M2 | HEART RATE: 78 BPM | OXYGEN SATURATION: 98 % | RESPIRATION RATE: 16 BRPM | HEIGHT: 67 IN | WEIGHT: 250.3 LBS | TEMPERATURE: 99.1 F | DIASTOLIC BLOOD PRESSURE: 84 MMHG | SYSTOLIC BLOOD PRESSURE: 136 MMHG

## 2021-05-19 DIAGNOSIS — S99.912A INJURY OF LEFT ANKLE, INITIAL ENCOUNTER: Primary | ICD-10-CM

## 2021-05-19 DIAGNOSIS — S99.912A INJURY OF LEFT ANKLE, INITIAL ENCOUNTER: ICD-10-CM

## 2021-05-19 PROCEDURE — 73610 X-RAY EXAM OF ANKLE: CPT | Mod: LT | Performed by: RADIOLOGY

## 2021-05-19 PROCEDURE — 99214 OFFICE O/P EST MOD 30 MIN: CPT | Performed by: NURSE PRACTITIONER

## 2021-05-19 ASSESSMENT — MIFFLIN-ST. JEOR: SCORE: 1912.98

## 2021-05-19 NOTE — PROGRESS NOTES
"    Assessment & Plan     Injury of left ankle, initial encounter  -ankle sprain, Xray normal, no fractures  -recommended to continue to use boot cast  -Tylenol or Ibuprofen as needed for pain  -follow up in 2 weeks if no improvement   - XR Ankle Left G/E 3 Views; Future      MARIO Hyde CNP  [facility]    Rajesh Mancia is a 26 year old who presents for the following health issues   HPI     Musculoskeletal problem/pain  Onset/Duration: Incident occurred on Saturday   Description  Location: left ankle   Joint Swelling: YES  Redness: YES and bruised   Pain: YES  Warmth: no  Intensity:  6/10  Progression of Symptoms:  worsening  Accompanying signs and symptoms:   Fevers: no  Numbness/tingling/weakness: no  History  Trauma to the area: YES- States she stepped the wrong way and twisted her ankle   Recent illness:  no  Previous similar problem: YES- has sprained the same ankle before   Previous evaluation:  no  Precipitating or alleviating factors:  Aggravating factors include: walking, putting pressure on it   Therapies tried and outcome: wearing an ace wrap and ibuprofen         Review of Systems   Constitutional, HEENT, cardiovascular, pulmonary, gi and gu systems are negative, except as otherwise noted.      Objective    /84 (BP Location: Right arm, Patient Position: Sitting, Cuff Size: Adult Large)   Pulse 78   Temp 99.1  F (37.3  C) (Tympanic)   Resp 16   Ht 1.702 m (5' 7\")   Wt 113.5 kg (250 lb 4.8 oz)   SpO2 98%   BMI 39.20 kg/m    Body mass index is 39.2 kg/m .  Physical Exam   GENERAL: healthy, alert and no distress  MS: LEFT Ankle: bilateral moderate edema  Normal DP artery pulse.   Normal PT artery pulse.   Pain with dorsiflexion, plantarflexion, inversion and eversion.    No tenderness over achilles insertion at posterior calcaneus.   Achilles palpably intact. Lino test negative.   NEURO: Normal strength and tone, mentation intact and speech " normal  PSYCH: mentation appears normal, affect normal/bright    Results for orders placed or performed in visit on 05/19/21   XR Ankle Left G/E 3 Views     Status: None (Preliminary result)    Narrative    LEFT ANKLE THREE OR MORE VIEWS  5/19/2021 4:47 PM     HISTORY: Injury of left ankle, initial encounter.    COMPARISON: Left ankle x-rays dated 7/22/2019.    FINDINGS:   There is no fracture.  Ankle mortise and talar dome are  intact and symmetric. Minimal enthesopathic changes are seen at the  Achilles tendon insertion into the calcaneus. Mild soft tissue  swelling around the lateral ankle could represent lateral ankle  sprain. Mild edema is seen in the subcutaneous adipose tissues of the  anterior aspect of the visualized portions of the lower leg on the  lateral view.      Impression    IMPRESSION:   1. Minimal edema is seen the subcutaneous adipose tissues anterior  aspect of the lower leg on the lateral view only.  2. Mild soft tissue swelling around the lateral malleolus could be  associated with a lateral ankle sprain.  3. Mild enthesopathic changes at the calcaneus.  4. No acute fracture or malalignment is identified.

## 2021-05-25 ENCOUNTER — MYC MEDICAL ADVICE (OUTPATIENT)
Dept: FAMILY MEDICINE | Facility: CLINIC | Age: 26
End: 2021-05-25

## 2021-05-25 DIAGNOSIS — S93.402A SPRAIN OF LEFT ANKLE, UNSPECIFIED LIGAMENT, INITIAL ENCOUNTER: Primary | ICD-10-CM

## 2021-05-26 NOTE — TELEPHONE ENCOUNTER
Marily,    Patient wanting her order for MRI now. Pended a MRI for your consideration. Yudi BAUTISTA RN

## 2021-05-27 NOTE — TELEPHONE ENCOUNTER
Patient has read provider's MyChart without further response, so closing encounter.     Mary Calderon RN  [facility]

## 2021-05-28 ENCOUNTER — HOSPITAL ENCOUNTER (OUTPATIENT)
Dept: MRI IMAGING | Facility: CLINIC | Age: 26
Discharge: HOME | End: 2021-05-28
Attending: NURSE PRACTITIONER | Admitting: NURSE PRACTITIONER
Payer: COMMERCIAL

## 2021-05-28 DIAGNOSIS — S93.402A SPRAIN OF LEFT ANKLE, UNSPECIFIED LIGAMENT, INITIAL ENCOUNTER: ICD-10-CM

## 2021-05-28 PROCEDURE — 73721 MRI JNT OF LWR EXTRE W/O DYE: CPT | Mod: LT

## 2021-05-28 PROCEDURE — 73721 MRI JNT OF LWR EXTRE W/O DYE: CPT | Mod: 26 | Performed by: RADIOLOGY

## 2021-06-01 ENCOUNTER — MYC MEDICAL ADVICE (OUTPATIENT)
Dept: FAMILY MEDICINE | Facility: CLINIC | Age: 26
End: 2021-06-01

## 2021-06-01 DIAGNOSIS — S93.499A SPRAIN OF ANTERIOR TALOFIBULAR LIGAMENT, UNSPECIFIED LATERALITY, INITIAL ENCOUNTER: Primary | ICD-10-CM

## 2021-06-02 NOTE — TELEPHONE ENCOUNTER
Can you please schedule RN appointment or with CMA to get this patient fitted for new ankle boot, she needs smaller size, please see my chart message    MARIO Hyde CNP

## 2021-06-03 ENCOUNTER — OFFICE VISIT (OUTPATIENT)
Dept: PODIATRY | Facility: CLINIC | Age: 26
End: 2021-06-03
Payer: COMMERCIAL

## 2021-06-03 VITALS
DIASTOLIC BLOOD PRESSURE: 113 MMHG | WEIGHT: 250 LBS | HEART RATE: 72 BPM | BODY MASS INDEX: 39.24 KG/M2 | SYSTOLIC BLOOD PRESSURE: 151 MMHG | HEIGHT: 67 IN

## 2021-06-03 DIAGNOSIS — T14.8XXA CONTUSION OF BONE: Primary | ICD-10-CM

## 2021-06-03 DIAGNOSIS — S93.492A SPRAIN OF ANTERIOR TALOFIBULAR LIGAMENT OF LEFT ANKLE, INITIAL ENCOUNTER: ICD-10-CM

## 2021-06-03 DIAGNOSIS — M76.72 PERONEAL TENDONITIS, LEFT: ICD-10-CM

## 2021-06-03 PROCEDURE — 99203 OFFICE O/P NEW LOW 30 MIN: CPT | Performed by: PODIATRIST

## 2021-06-03 ASSESSMENT — MIFFLIN-ST. JEOR: SCORE: 1911.62

## 2021-06-03 ASSESSMENT — PAIN SCALES - GENERAL: PAINLEVEL: MODERATE PAIN (4)

## 2021-06-03 NOTE — PATIENT INSTRUCTIONS
Next Steps:      1. Support:  a. Wear supportive shoes, sandals, boots and/or inserts that have a rigid supportive sole.    i. This will offload the majority of tension forces that travel through your feet every step you take.    1. [employer] Max Cushioning Elite/Premier   2. [employer] Relax Fit D'Lux Walker  3. Superfeet inserts (www.superfeet.com)  b. It is important that you also wear supportive shoe wear in the house to continue providing support to your feet.    c. You may always use a cushioned liner for your shoes if that makes your feet feel better.  2. Stretching  a. Calf stretching is essential to offload the tension forces that travel through your feet every step you take  b. Preferred calf stretch is the Runner's Stretch  i. Place one foot behind the other foot, flat against the ground (it is important to keep the heel on the ground).  The back leg is the one that will be stretched.  1. Start with the knee straight and lean your hips into the wall, counter or whatever you are leaning into - count to ten.  2. Next, bend the knee.  You should feel the stretch lower in the calf muscle - count to ten.  c. Repeat this stretch once an hour to start off with.  When symptoms subside, I recommend performing the stretch 3 times daily to prevent any future problems.                3. Tissue Massage  a. It is important that you physically loosen the inflammation tissue to help your body heal the injured tissue.  b. I recommend soaking your foot in warm water to increase the microcirculation to the soft tissues.  You may add Epson salt to the water if you prefer.  c. You may apply an over-the-counter muscle rub, such as Voltaren gel, and deeply massage the injured tissue.  4. Reduce Inflammation  a. You can ice the injured tissue with an ice pack with a light cloth covering or soaking in ice water 20 minutes to reduce any acute inflammation, typically at the end of the day.  b. If tolerated, you may take a  Non-Steroidal Antiinflammatory medication (NSAID), such as Advil or Aleve, to help reduce the inflammation tissue.  This can help the overall healing of the injured tissue.  i. It is important to take food with any NSAID medication as the most common side effect is stomach irritation.  If you encounter any problems when taking NSAID, it is recommended that you stop taking the medication and notify your provider.    It is important to understand that most problems that develop in the foot and ankle are caused by excessive tension that cause microinjury to the soft tissues and inflammation in the foot and ankle.  By addressing the underlying causes with support and stretching as well as treating the current inflammatory conditions with tissue massage and anti-inflammatory treatments, most foot and ankle musculoskeletal conditions will resolve.  This may take time to heal.  However, if symptoms persist past 4 weeks you should return to the office for reevaluation to determine further treatment options.

## 2021-06-03 NOTE — LETTER
6/3/2021         RE: Blanche Lyman  [address] [place] [address]        Dear Colleague,    Thank you for referring your patient, Blanche Lyman, to the [facility] [place]. Please see a copy of my visit note below.    PATIENT HISTORY:  Blanche Lyman is a 26 year old female who presents to clinic with a chief complaint of left ankle pain.  The patient relates stepping into a hole recently.  The patient relates the pain is primarily located around the top of arch and into the lateral side of the ankle on the left.  The patient relates that the problem has been going on for 2-3 weeks  and is getting worse.  The patient relates trying cam boot, ibuprofen and tylenol with little relief.  The patient is currently employed as a .  The patient was referred by Marily MARTIN CNP for consultation on the left foot.  Any previous notes and studies that pertain to the patient's condition were reviewed.    Pertinent medical, surgical and family history was reviewed in Epic.    Medications:   Current Outpatient Medications:      levonorgestrel-ethinyl estradiol (NORDETTE) 0.15-30 MG-MCG tablet, , Disp: , Rfl:      sertraline (ZOLOFT) 100 MG tablet, Take 1 tablets 100 MG by mouth daily, Disp: 90 tablet, Rfl: 1     Allergies:    Allergies   Allergen Reactions     Dairy Products [Milk Protein Extract] Other (See Comments)     Headaches, stomach aches       Peanuts [Nuts] Other (See Comments)     Headaches, stomach aches       Wheat Gluten [Gluten Meal] Other (See Comments)     Headaches, stomach aches         Vitals: There were no vitals taken for this visit.  BMI= There is no height or weight on file to calculate BMI.    LOWER EXTREMITY PHYSICAL EXAM    Dermatologic: Skin is intact to left lower extremities without significant lesions, rash or abrasion.        Vascular: DP & PT pulses are intact & regular on the left.   CFT and skin temperature is normal to the left lower  extremities.     Neurologic: Lower extremity sensation is intact to light touch.  No evidence of weakness in the left lower extremities.        Musculoskeletal: Patient is ambulatory without assistive device or brace.  No gross ankle deformity noted.  No foot or ankle joint effusion is noted.  Noted pain on palpation of the sinus tarsi overlying the anterior talofibular ligament on the left.  Noted pain on palpation over the peroneal tendons on the left foot and ankle.  Noted pain with plantarflexion and eversion against resistance on the left.    Diagnostics:  Radiographs included three views of the left ankle demonstrating   no cortical erosions or periosteal elevation.  All joint margins appear stable.  There is no apparent fracture or tumor formation noted.  There is no evidence of foreign body.  The images were independently reviewed by myself along with the patient explaining the findings.      ASSESSMENT / PLAN:     ICD-10-CM    1. Sprain of anterior talofibular ligament, unspecified laterality, initial encounter  S93.499A Orthopedic & Spine  Referral       I have explained to Blanche about the conditions.  We discussed the underlying contributing factors of the condition as well as both conservative and surgical treatment options along with expected length of recovery.  At this time, the patient was educated on the importance of offloading supportive shoes and other devices.  I demonstrated to the patient calf stretches to perform every hour daily until symptoms resolve.  After symptoms resolve, the patient was advised to perform the stretches 3 times daily to prevent future recurrence.  The patient was instructed to perform warm soaks with Epson salt after which to also apply over-the-counter Voltaren gel to deeply massage the injured tissue.  The patient was instructed to do this on a daily basis until symptoms resolve.  The patient may also take over-the-counter NSAID medication, if tolerated, to  help further reduce the inflammation tissue.   The patient was advised to take this type of medication with food to prevent stomach irritation and to stop taking the medication if stomach irritation occurs.  The patient was fitted with a cam boot that will aid in offloading the tension forces to the soft tissues and prevent further inflammation.  The patient was prescribed a knee walker. The patient is unsteady utilizing crutches, quad walker or a cane.  The knee walker will allow the patient to ambulate safely without the use of the operative foot and reduce risk of falls.  The patient will return in four weeks for reevaluation.    Blanche verbalized agreement with and understanding of the rational for the diagnosis and treatment plan.  All questions were answered to best of my ability and the patient's satisfaction. The patient was advised to contact the clinic with any questions that may arise after the clinic visit.      Disclaimer: This note consists of symbols derived from keyboarding, dictation and/or voice recognition software. As a result, there may be errors in the script that have gone undetected. Please consider this when interpreting information found in this chart.       ILIANA Guardado.P.M., F.A.C.F.A.S.        Again, thank you for allowing me to participate in the care of your patient.        Sincerely,        Shravan Charlton DPM

## 2021-06-03 NOTE — PROGRESS NOTES
PATIENT HISTORY:  Blanche Lyman is a 26 year old female who presents to clinic with a chief complaint of left ankle pain.  The patient relates stepping into a hole recently.  The patient relates the pain is primarily located around the top of arch and into the lateral side of the ankle on the left.  The patient relates that the problem has been going on for 2-3 weeks  and is getting worse.  The patient relates trying cam boot, ibuprofen and tylenol with little relief.  The patient is currently employed as a .  The patient was referred by Marily MARTIN CNP for consultation on the left foot.  Any previous notes and studies that pertain to the patient's condition were reviewed.    Pertinent medical, surgical and family history was reviewed in Epic.    Medications:   Current Outpatient Medications:      levonorgestrel-ethinyl estradiol (NORDETTE) 0.15-30 MG-MCG tablet, , Disp: , Rfl:      sertraline (ZOLOFT) 100 MG tablet, Take 1 tablets 100 MG by mouth daily, Disp: 90 tablet, Rfl: 1     Allergies:    Allergies   Allergen Reactions     Dairy Products [Milk Protein Extract] Other (See Comments)     Headaches, stomach aches       Peanuts [Nuts] Other (See Comments)     Headaches, stomach aches       Wheat Gluten [Gluten Meal] Other (See Comments)     Headaches, stomach aches         Vitals: There were no vitals taken for this visit.  BMI= There is no height or weight on file to calculate BMI.    LOWER EXTREMITY PHYSICAL EXAM    Dermatologic: Skin is intact to left lower extremities without significant lesions, rash or abrasion.        Vascular: DP & PT pulses are intact & regular on the left.   CFT and skin temperature is normal to the left lower extremities.     Neurologic: Lower extremity sensation is intact to light touch.  No evidence of weakness in the left lower extremities.        Musculoskeletal: Patient is ambulatory without assistive device or brace.  No gross ankle deformity noted.  No foot  or ankle joint effusion is noted.  Noted pain on palpation of the sinus tarsi overlying the anterior talofibular ligament on the left.  Noted pain on palpation over the peroneal tendons on the left foot and ankle.  Noted pain with plantarflexion and eversion against resistance on the left.    Diagnostics:  Radiographs included three views of the left ankle demonstrating   no cortical erosions or periosteal elevation.  All joint margins appear stable.  There is no apparent fracture or tumor formation noted.  There is no evidence of foreign body.  The images were independently reviewed by myself along with the patient explaining the findings.      ASSESSMENT / PLAN:     ICD-10-CM    1. Sprain of anterior talofibular ligament, unspecified laterality, initial encounter  S93.499A Orthopedic & Spine  Referral       I have explained to Blanche about the conditions.  We discussed the underlying contributing factors of the condition as well as both conservative and surgical treatment options along with expected length of recovery.  At this time, the patient was educated on the importance of offloading supportive shoes and other devices.  I demonstrated to the patient calf stretches to perform every hour daily until symptoms resolve.  After symptoms resolve, the patient was advised to perform the stretches 3 times daily to prevent future recurrence.  The patient was instructed to perform warm soaks with Epson salt after which to also apply over-the-counter Voltaren gel to deeply massage the injured tissue.  The patient was instructed to do this on a daily basis until symptoms resolve.  The patient may also take over-the-counter NSAID medication, if tolerated, to help further reduce the inflammation tissue.   The patient was advised to take this type of medication with food to prevent stomach irritation and to stop taking the medication if stomach irritation occurs.  The patient was fitted with a cam boot that will  aid in offloading the tension forces to the soft tissues and prevent further inflammation.  The patient was prescribed a knee walker. The patient is unsteady utilizing crutches, quad walker or a cane.  The knee walker will allow the patient to ambulate safely without the use of the operative foot and reduce risk of falls.  The patient will return in four weeks for reevaluation.    Blanche verbalized agreement with and understanding of the rational for the diagnosis and treatment plan.  All questions were answered to best of my ability and the patient's satisfaction. The patient was advised to contact the clinic with any questions that may arise after the clinic visit.      Disclaimer: This note consists of symbols derived from keyboarding, dictation and/or voice recognition software. As a result, there may be errors in the script that have gone undetected. Please consider this when interpreting information found in this chart.       RENITA Charlton D.P.M., [email or web address].

## 2021-06-17 ENCOUNTER — OFFICE VISIT (OUTPATIENT)
Dept: PODIATRY | Facility: CLINIC | Age: 26
End: 2021-06-17
Payer: COMMERCIAL

## 2021-06-17 VITALS
DIASTOLIC BLOOD PRESSURE: 98 MMHG | HEIGHT: 67 IN | HEART RATE: 69 BPM | WEIGHT: 250 LBS | SYSTOLIC BLOOD PRESSURE: 138 MMHG | BODY MASS INDEX: 39.24 KG/M2

## 2021-06-17 DIAGNOSIS — S93.492D SPRAIN OF ANTERIOR TALOFIBULAR LIGAMENT OF LEFT ANKLE, SUBSEQUENT ENCOUNTER: Primary | ICD-10-CM

## 2021-06-17 DIAGNOSIS — M77.52 LEFT ANKLE TENDONITIS: ICD-10-CM

## 2021-06-17 DIAGNOSIS — M76.72 PERONEAL TENDONITIS, LEFT: ICD-10-CM

## 2021-06-17 PROCEDURE — 99213 OFFICE O/P EST LOW 20 MIN: CPT | Performed by: PODIATRIST

## 2021-06-17 RX ORDER — MELOXICAM 7.5 MG/1
7.5 TABLET ORAL DAILY
Qty: 30 TABLET | Refills: 1 | Status: SHIPPED | OUTPATIENT
Start: 2021-06-17 | End: 2021-08-02

## 2021-06-17 ASSESSMENT — PAIN SCALES - GENERAL: PAINLEVEL: MODERATE PAIN (4)

## 2021-06-17 ASSESSMENT — MIFFLIN-ST. JEOR: SCORE: 1911.62

## 2021-06-17 NOTE — NURSING NOTE
"Chief Complaint   Patient presents with     RECHECK     \"no improvement\" Sprain of anterior talofibular ligament       Initial BP (!) 138/98   Pulse 69   Ht 1.702 m (5' 7\")   Wt 113.4 kg (250 lb)   BMI 39.16 kg/m   Estimated body mass index is 39.16 kg/m  as calculated from the following:    Height as of this encounter: 1.702 m (5' 7\").    Weight as of this encounter: 113.4 kg (250 lb).  Medications and allergies reviewed.      Linda BARRIENTOS MA    "

## 2021-06-17 NOTE — PROGRESS NOTES
"Blanche returns to the office for reevaluation of the left ankle.  The patient relates following the instructions given at the last visit with noted overall minimal improvement in pain   The patient relates no other problems.    Vitals: BP (!) 138/98   Pulse 69   Ht 1.702 m (5' 7\")   Wt 113.4 kg (250 lb)   BMI 39.16 kg/m    BMI= Body mass index is 39.16 kg/m .    Lower Extremity Physical Exam:      Neurovascular status remains unchanged.  Muscular exam is within normal limits to major muscle groups.  Integument is intact.      Noted pain on palpation over the anterior talofibular ligament on the left.  Noted pain with ankle inversion on the left.  Noted pain on palpation over the peroneal tendons on the left.  Noted pain on palpation over the anterior extensor tendons on the left.       Assessment:     ICD-10-CM    1. Sprain of anterior talofibular ligament of left ankle, subsequent encounter  S93.492D PHYSICAL THERAPY REFERRAL     meloxicam (MOBIC) 7.5 MG tablet   2. Peroneal tendonitis, left  M76.72 PHYSICAL THERAPY REFERRAL     meloxicam (MOBIC) 7.5 MG tablet   3. Left ankle tendonitis  M77.52 PHYSICAL THERAPY REFERRAL     meloxicam (MOBIC) 7.5 MG tablet    anterior extensor tendons       Plan:    I have explained to Blanche about the progress of the conditions.  At this time, the patient was educated on the importance of offloading supportive shoes and other devices.  I demonstrated to the patient calf stretches to perform every hour daily until symptoms resolve.  After symptoms resolve, the patient was advised to perform the stretches 3 times daily to prevent future recurrence.  The patient was instructed to perform warm soaks with Epson salt after which to also apply over-the-counter Voltaren gel to deeply massage the injured tissue.  The patient was instructed to do this on a daily basis until symptoms resolve.  The patient may also take over-the-counter NSAID medication, if tolerated, to help further " reduce the inflammation tissue.   The patient was advised to take this type of medication with food to prevent stomach irritation and to stop taking the medication if stomach irritation occurs.  The patient was referred to physical therapy for left ankle rehabilitation.  To reduce the amount of current inflammation, the patient was prescribed Mobic 7.5 mg to be taken daily with food and instructed to stop taking if any stomach irritation or swelling in extremities are noted.  The patient will return in four weeks for reevaluation if the symptoms do not resolve.      Blanche verbalized agreement with and understanding of the rational for the diagnosis and treatment plan.  All questions were answered to best of my ability and the patient's satisfaction. The patient was advised to contact the clinic with any questions that may arise after the clinic visit.      Disclaimer: This note consists of symbols derived from keyboarding, dictation and/or voice recognition software. As a result, there may be errors in the script that have gone undetected. Please consider this when interpreting information found in this chart.       RENITA Charlton D.P.M., F.ERASMO.C.F.A.S.

## 2021-06-17 NOTE — PATIENT INSTRUCTIONS
Next Steps:      1. Support:  a. Wear supportive shoes, sandals, boots and/or inserts that have a rigid supportive sole.    i. This will offload the majority of tension forces that travel through your feet every step you take.    1. [employer] Max Cushioning Elite/Premier   2. [employer] Relax Fit D'Lux Walker  3. Superfeet inserts (www.superfeet.com)  b. It is important that you also wear supportive shoe wear in the house to continue providing support to your feet.    c. You may always use a cushioned liner for your shoes if that makes your feet feel better.  2. Stretching  a. Calf stretching is essential to offload the tension forces that travel through your feet every step you take  b. Preferred calf stretch is the Runner's Stretch  i. Place one foot behind the other foot, flat against the ground (it is important to keep the heel on the ground).  The back leg is the one that will be stretched.  1. Start with the knee straight and lean your hips into the wall, counter or whatever you are leaning into - count to ten.  2. Next, bend the knee.  You should feel the stretch lower in the calf muscle - count to ten.  c. Repeat this stretch once an hour to start off with.  When symptoms subside, I recommend performing the stretch 3 times daily to prevent any future problems.                3. Tissue Massage  a. It is important that you physically loosen the inflammation tissue to help your body heal the injured tissue.  b. I recommend soaking your foot in warm water to increase the microcirculation to the soft tissues.  You may add Epson salt to the water if you prefer.  c. You may apply an over-the-counter muscle rub, such as Voltaren gel, and deeply massage the injured tissue.  4. Reduce Inflammation  a. You can ice the injured tissue with an ice pack with a light cloth covering or soaking in ice water 20 minutes to reduce any acute inflammation, typically at the end of the day.  b. If tolerated, you may take a  Non-Steroidal Antiinflammatory medication (NSAID), such as Advil or Aleve, to help reduce the inflammation tissue.  This can help the overall healing of the injured tissue.  i. It is important to take food with any NSAID medication as the most common side effect is stomach irritation.  If you encounter any problems when taking NSAID, it is recommended that you stop taking the medication and notify your provider.    It is important to understand that most problems that develop in the foot and ankle are caused by excessive tension that cause microinjury to the soft tissues and inflammation in the foot and ankle.  By addressing the underlying causes with support and stretching as well as treating the current inflammatory conditions with tissue massage and anti-inflammatory treatments, most foot and ankle musculoskeletal conditions will resolve.  This may take time to heal.  However, if symptoms persist past 4 weeks you should return to the office for reevaluation to determine further treatment options.      Blanche to follow up with Primary Care provider regarding elevated blood pressure.

## 2021-06-17 NOTE — LETTER
"    6/17/2021         RE: Blanche Lyman  [address] [facility] [address]        Dear Colleague,    Thank you for referring your patient, Blanche Lyman, to the [facility] [place]. Please see a copy of my visit note below.    Blanche returns to the office for reevaluation of the left ankle.  The patient relates following the instructions given at the last visit with noted overall minimal improvement in pain   The patient relates no other problems.    Vitals: BP (!) 138/98   Pulse 69   Ht 1.702 m (5' 7\")   Wt 113.4 kg (250 lb)   BMI 39.16 kg/m    BMI= Body mass index is 39.16 kg/m .    Lower Extremity Physical Exam:      Neurovascular status remains unchanged.  Muscular exam is within normal limits to major muscle groups.  Integument is intact.      Noted pain on palpation over the anterior talofibular ligament on the left.  Noted pain with ankle inversion on the left.  Noted pain on palpation over the peroneal tendons on the left.  Noted pain on palpation over the anterior extensor tendons on the left.       Assessment:     ICD-10-CM    1. Sprain of anterior talofibular ligament of left ankle, subsequent encounter  S93.492D PHYSICAL THERAPY REFERRAL     meloxicam (MOBIC) 7.5 MG tablet   2. Peroneal tendonitis, left  M76.72 PHYSICAL THERAPY REFERRAL     meloxicam (MOBIC) 7.5 MG tablet   3. Left ankle tendonitis  M77.52 PHYSICAL THERAPY REFERRAL     meloxicam (MOBIC) 7.5 MG tablet    anterior extensor tendons       Plan:    I have explained to Blanche about the progress of the conditions.  At this time, the patient was educated on the importance of offloading supportive shoes and other devices.  I demonstrated to the patient calf stretches to perform every hour daily until symptoms resolve.  After symptoms resolve, the patient was advised to perform the stretches 3 times daily to prevent future recurrence.  The patient was instructed to perform warm soaks with Epson salt after " which to also apply over-the-counter Voltaren gel to deeply massage the injured tissue.  The patient was instructed to do this on a daily basis until symptoms resolve.  The patient may also take over-the-counter NSAID medication, if tolerated, to help further reduce the inflammation tissue.   The patient was advised to take this type of medication with food to prevent stomach irritation and to stop taking the medication if stomach irritation occurs.  The patient was referred to physical therapy for left ankle rehabilitation.  To reduce the amount of current inflammation, the patient was prescribed Mobic 7.5 mg to be taken daily with food and instructed to stop taking if any stomach irritation or swelling in extremities are noted.  The patient will return in four weeks for reevaluation if the symptoms do not resolve.      Blanche verbalized agreement with and understanding of the rational for the diagnosis and treatment plan.  All questions were answered to best of my ability and the patient's satisfaction. The patient was advised to contact the clinic with any questions that may arise after the clinic visit.      Disclaimer: This note consists of symbols derived from keyboarding, dictation and/or voice recognition software. As a result, there may be errors in the script that have gone undetected. Please consider this when interpreting information found in this chart.       ILIANA Guardado.P.M., F.A.C.F.A.S.        Again, thank you for allowing me to participate in the care of your patient.        Sincerely,        Srhavan Charlton DPM

## 2021-06-30 ENCOUNTER — HOSPITAL ENCOUNTER (OUTPATIENT)
Dept: PHYSICAL THERAPY | Facility: CLINIC | Age: 26
Setting detail: THERAPIES SERIES
End: 2021-06-30
Attending: FAMILY MEDICINE
Payer: COMMERCIAL

## 2021-06-30 DIAGNOSIS — M77.52 LEFT ANKLE TENDONITIS: ICD-10-CM

## 2021-06-30 DIAGNOSIS — M76.72 PERONEAL TENDONITIS, LEFT: ICD-10-CM

## 2021-06-30 DIAGNOSIS — S93.492D SPRAIN OF ANTERIOR TALOFIBULAR LIGAMENT OF LEFT ANKLE, SUBSEQUENT ENCOUNTER: ICD-10-CM

## 2021-06-30 PROCEDURE — 97035 APP MDLTY 1+ULTRASOUND EA 15: CPT | Mod: GP | Performed by: PHYSICAL THERAPIST

## 2021-06-30 PROCEDURE — 97110 THERAPEUTIC EXERCISES: CPT | Mod: GP | Performed by: PHYSICAL THERAPIST

## 2021-06-30 PROCEDURE — 97162 PT EVAL MOD COMPLEX 30 MIN: CPT | Mod: GP | Performed by: PHYSICAL THERAPIST

## 2021-06-30 NOTE — PROGRESS NOTES
[facility]    OUTPATIENT PHYSICAL THERAPY ORTHOPEDIC EVALUATION  PLAN OF TREATMENT FOR OUTPATIENT REHABILITATION  (COMPLETE FOR INITIAL CLAIMS ONLY)  Patient's Last Name, First Name, M.I.  YOB: 1995  Blanche Lyman    Provider s Name:  [facility]   Medical Record No.  [id number]   Start of Care Date:  06/30/21   Onset Date:  05/22/21   Type:     _X__PT   ___OT   ___SLP Medical Diagnosis:  L Sprain of Ankle Ligaments,  Peroneal Tendonitis,      PT Diagnosis:  L Severe Ankle Sprain, Multiple previous Ankle Sprains.    Visits from SOC:  1      _________________________________________________________________________________  Plan of Treatment/Functional Goals:     DTFM, STM, Jt mobs, Develop HEP, PTRX# [id number]       Goals  Goal Identifier: 1  Goal Description: STG: Pt will be able to walk 2 blocks w/ moderate difficulty  Target Date: 07/28/21    Goal Identifier: 2  Goal Description: STG: Pt will be able to do stairs normally and w/ minimal difficulty.   Target Date: 07/28/21    Goal Identifier: 3  Goal Description: STG: Pt will be able to transition to a brace for better ability to navigate stairs and walking more normally.   Target Date: 07/28/21    Goal Identifier: 4  Goal Description: LTG: Pt will be independent w/ HEP and self cares to manage Severe L Ankle Sprain.   Target Date: 08/12/21    Therapy Frequency:  2 times/Week  Predicted Duration of Therapy Intervention:  6 weeks, 8 visits as needed and decreasing frequency as appropriate.     Serena Magana, PT, MTC                  I CERTIFY THE NEED FOR THESE SERVICES FURNISHED UNDER        THIS PLAN OF TREATMENT AND WHILE UNDER MY CARE     (Physician co-signature of this document indicates review and certification of the therapy plan).                       Certification Date From:  06/30/21   Certification Date To:  08/05/21    Referring Provider:  Ryan Charlton  Assessment        See Epic Evaluation Start of Care Date: 06/30/21

## 2021-07-07 ENCOUNTER — HOSPITAL ENCOUNTER (OUTPATIENT)
Dept: PHYSICAL THERAPY | Facility: CLINIC | Age: 26
Setting detail: THERAPIES SERIES
End: 2021-07-07
Attending: PODIATRIST
Payer: COMMERCIAL

## 2021-07-07 PROCEDURE — 97035 APP MDLTY 1+ULTRASOUND EA 15: CPT | Mod: GP | Performed by: PHYSICAL THERAPIST

## 2021-07-07 PROCEDURE — 97110 THERAPEUTIC EXERCISES: CPT | Mod: GP | Performed by: PHYSICAL THERAPIST

## 2021-07-14 ENCOUNTER — HOSPITAL ENCOUNTER (OUTPATIENT)
Dept: PHYSICAL THERAPY | Facility: CLINIC | Age: 26
Setting detail: THERAPIES SERIES
End: 2021-07-14
Attending: PODIATRIST
Payer: COMMERCIAL

## 2021-07-14 PROCEDURE — 97140 MANUAL THERAPY 1/> REGIONS: CPT | Mod: GP | Performed by: PHYSICAL THERAPIST

## 2021-07-14 PROCEDURE — 97110 THERAPEUTIC EXERCISES: CPT | Mod: GP | Performed by: PHYSICAL THERAPIST

## 2021-07-15 ENCOUNTER — OFFICE VISIT (OUTPATIENT)
Dept: PODIATRY | Facility: CLINIC | Age: 26
End: 2021-07-15
Payer: COMMERCIAL

## 2021-07-15 VITALS
HEIGHT: 67 IN | WEIGHT: 250 LBS | BODY MASS INDEX: 39.24 KG/M2 | DIASTOLIC BLOOD PRESSURE: 84 MMHG | SYSTOLIC BLOOD PRESSURE: 123 MMHG

## 2021-07-15 DIAGNOSIS — S93.492D SPRAIN OF ANTERIOR TALOFIBULAR LIGAMENT OF LEFT ANKLE, SUBSEQUENT ENCOUNTER: Primary | ICD-10-CM

## 2021-07-15 PROCEDURE — 99213 OFFICE O/P EST LOW 20 MIN: CPT | Performed by: PODIATRIST

## 2021-07-15 ASSESSMENT — MIFFLIN-ST. JEOR: SCORE: 1911.62

## 2021-07-15 NOTE — LETTER
"    7/15/2021         RE: Blanche Lyman  [address] [facility] [address]        Dear Colleague,    Thank you for referring your patient, Blanche Lyman, to the [facility] [place]. Please see a copy of my visit note below.    Blanche returns to the office for reevaluation of the left ankle.  The patient relates following the instructions given at the last visit with noted overall less pain and more improvement in function of the left ankle.   The patient relates no other problems.    Vitals: /84   Ht 1.702 m (5' 7\")   Wt 113.4 kg (250 lb)   BMI 39.16 kg/m    BMI= Body mass index is 39.16 kg/m .    Lower Extremity Physical Exam:      Neurovascular status remains unchanged.  Muscular exam is within normal limits to major muscle groups.  Integument is intact.      Noted improved ankle range of motion with decreased pain over the ATF ligament on the left.         Assessment:     ICD-10-CM    1. Sprain of anterior talofibular ligament of left ankle, subsequent encounter  S93.492D Ankle/Foot Bracing Supplies DME       Plan:    I have explained to Blanche about the progress of the conditions.  At this time, the patient was educated on the importance of offloading supportive shoes and other devices.  I demonstrated to the patient calf stretches to perform every hour daily until symptoms resolve.  After symptoms resolve, the patient was advised to perform the stretches 3 times daily to prevent future recurrence.  The patient was instructed to perform warm soaks with Epson salt after which to also apply over-the-counter Voltaren gel to deeply massage the injured tissue.  The patient was instructed to do this on a daily basis until symptoms resolve.  The patient may also take over-the-counter NSAID medication, if tolerated, to help further reduce the inflammation tissue.   The patient was advised to take this type of medication with food to prevent stomach irritation and to stop " taking the medication if stomach irritation occurs.  The patient was fitted with a Tri-Lock ankle brace that will aid in offloading the tension forces to the soft tissues and prevent further inflammation.   The patient will return in four weeks for reevaluation if the symptoms do not resolve.      Blanche verbalized agreement with and understanding of the rational for the diagnosis and treatment plan.  All questions were answered to best of my ability and the patient's satisfaction. The patient was advised to contact the clinic with any questions that may arise after the clinic visit.      Disclaimer: This note consists of symbols derived from keyboarding, dictation and/or voice recognition software. As a result, there may be errors in the script that have gone undetected. Please consider this when interpreting information found in this chart.       ILIANA Guardado.P.KYARA., F.A.C.F.A.S.        Again, thank you for allowing me to participate in the care of your patient.        Sincerely,        Shravan Charlton DPM

## 2021-07-15 NOTE — PATIENT INSTRUCTIONS
Next Steps:      1. Support:  a. Wear supportive shoes, sandals, boots and/or inserts that have a rigid supportive sole.    i. This will offload the majority of tension forces that travel through your feet every step you take.    1. SkBookTour Max Cushioning Elite/Premier   2. Skechers Relax Fit D'Lux Walker  3. Superfeet inserts (www.superfeet.com)  b. It is important that you also wear supportive shoe wear in the house to continue providing support to your feet.    c. You may always use a cushioned liner for your shoes if that makes your feet feel better.  2. Stretching  a. Calf stretching is essential to offload the tension forces that travel through your feet every step you take  b. Preferred calf stretch is the Runner's Stretch  i. Place one foot behind the other foot, flat against the ground (it is important to keep the heel on the ground).  The back leg is the one that will be stretched.  1. Start with the knee straight and lean your hips into the wall, counter or whatever you are leaning into - count to ten.  2. Next, bend the knee.  You should feel the stretch lower in the calf muscle - count to ten.  c. Repeat this stretch once an hour to start off with.  When symptoms subside, I recommend performing the stretch 3 times daily to prevent any future problems.                3. Tissue Massage  a. It is important that you physically loosen the inflammation tissue to help your body heal the injured tissue.  b. I recommend soaking your foot in warm water to increase the microcirculation to the soft tissues.  You may add Epson salt to the water if you prefer.  c. You may apply an over-the-counter muscle rub, such as Voltaren gel, and deeply massage the injured tissue.  4. Reduce Inflammation  a. You can ice the injured tissue with an ice pack with a light cloth covering or soaking in ice water 20 minutes to reduce any acute inflammation, typically at the end of the day.  b. If tolerated, you may take a  Non-Steroidal Antiinflammatory medication (NSAID), such as Advil or Aleve, to help reduce the inflammation tissue.  This can help the overall healing of the injured tissue.  i. It is important to take food with any NSAID medication as the most common side effect is stomach irritation.  If you encounter any problems when taking NSAID, it is recommended that you stop taking the medication and notify your provider.    It is important to understand that most problems that develop in the foot and ankle are caused by excessive tension that cause microinjury to the soft tissues and inflammation in the foot and ankle.  By addressing the underlying causes with support and stretching as well as treating the current inflammatory conditions with tissue massage and anti-inflammatory treatments, most foot and ankle musculoskeletal conditions will resolve.  This may take time to heal.  However, if symptoms persist past 4 weeks you should return to the office for reevaluation to determine further treatment options.

## 2021-07-15 NOTE — PROGRESS NOTES
"Blanche returns to the office for reevaluation of the left ankle.  The patient relates following the instructions given at the last visit with noted overall less pain and more improvement in function of the left ankle.   The patient relates no other problems.    Vitals: /84   Ht 1.702 m (5' 7\")   Wt 113.4 kg (250 lb)   BMI 39.16 kg/m    BMI= Body mass index is 39.16 kg/m .    Lower Extremity Physical Exam:      Neurovascular status remains unchanged.  Muscular exam is within normal limits to major muscle groups.  Integument is intact.      Noted improved ankle range of motion with decreased pain over the ATF ligament on the left.         Assessment:     ICD-10-CM    1. Sprain of anterior talofibular ligament of left ankle, subsequent encounter  S93.492D Ankle/Foot Bracing Supplies DME       Plan:    I have explained to Blanche about the progress of the conditions.  At this time, the patient was educated on the importance of offloading supportive shoes and other devices.  I demonstrated to the patient calf stretches to perform every hour daily until symptoms resolve.  After symptoms resolve, the patient was advised to perform the stretches 3 times daily to prevent future recurrence.  The patient was instructed to perform warm soaks with Epson salt after which to also apply over-the-counter Voltaren gel to deeply massage the injured tissue.  The patient was instructed to do this on a daily basis until symptoms resolve.  The patient may also take over-the-counter NSAID medication, if tolerated, to help further reduce the inflammation tissue.   The patient was advised to take this type of medication with food to prevent stomach irritation and to stop taking the medication if stomach irritation occurs.  The patient was fitted with a Tri-Lock ankle brace that will aid in offloading the tension forces to the soft tissues and prevent further inflammation.   The patient will return in four weeks for reevaluation if " the symptoms do not resolve.      Blanche verbalized agreement with and understanding of the rational for the diagnosis and treatment plan.  All questions were answered to best of my ability and the patient's satisfaction. The patient was advised to contact the clinic with any questions that may arise after the clinic visit.      Disclaimer: This note consists of symbols derived from keyboarding, dictation and/or voice recognition software. As a result, there may be errors in the script that have gone undetected. Please consider this when interpreting information found in this chart.       RENITA Charlton D.P.M., [email or web address].

## 2021-07-21 ENCOUNTER — NURSE TRIAGE (OUTPATIENT)
Dept: NURSING | Facility: CLINIC | Age: 26
End: 2021-07-21

## 2021-07-21 ENCOUNTER — OFFICE VISIT (OUTPATIENT)
Dept: FAMILY MEDICINE | Facility: CLINIC | Age: 26
End: 2021-07-21
Payer: COMMERCIAL

## 2021-07-21 VITALS
OXYGEN SATURATION: 99 % | DIASTOLIC BLOOD PRESSURE: 86 MMHG | HEIGHT: 67 IN | HEART RATE: 76 BPM | WEIGHT: 252.8 LBS | RESPIRATION RATE: 14 BRPM | TEMPERATURE: 99.3 F | SYSTOLIC BLOOD PRESSURE: 128 MMHG | BODY MASS INDEX: 39.68 KG/M2

## 2021-07-21 DIAGNOSIS — H60.391 INFECTIVE OTITIS EXTERNA, RIGHT: Primary | ICD-10-CM

## 2021-07-21 PROCEDURE — 99213 OFFICE O/P EST LOW 20 MIN: CPT | Performed by: NURSE PRACTITIONER

## 2021-07-21 RX ORDER — CIPROFLOXACIN AND DEXAMETHASONE 3; 1 MG/ML; MG/ML
4 SUSPENSION/ DROPS AURICULAR (OTIC) 2 TIMES DAILY
Qty: 7.5 ML | Refills: 0 | Status: SHIPPED | OUTPATIENT
Start: 2021-07-21 | End: 2021-08-12

## 2021-07-21 ASSESSMENT — MIFFLIN-ST. JEOR: SCORE: 1924.32

## 2021-07-21 NOTE — TELEPHONE ENCOUNTER
Blanche feels that she may ruptured her ear drum last night.  Right ear.  Last night right ear was bleeding and she has no hearing in it at all.  Denies trauma to ear.  Denies fever cough and shortness of breath.  Patient is requesting an in person clinic visit.    COVID 19 Nurse Triage Plan/Patient Instructions    Please be aware that novel coronavirus (COVID-19) may be circulating in the community. If you develop symptoms such as fever, cough, or SOB or if you have concerns about the presence of another infection including coronavirus (COVID-19), please contact your health care provider or visit [email or web address].     Disposition/Instructions    In-Person Visit with provider recommended. Reference Visit Selection Guide.    Thank you for taking steps to prevent the spread of this virus.  o Limit your contact with others.  o Wear a simple mask to cover your cough.  o Wash your hands well and often.    Resources    [facility]: About COVID-19: [email or web address]    CDC: What to Do If You're Sick: www.cdc.gov/coronavirus/2019-ncov/about/steps-when-sick.html    CDC: Ending Home Isolation: www.cdc.gov/coronavirus/2019-ncov/hcp/disposition-in-home-patients.html     CDC: Caring for Someone: www.cdc.gov/coronavirus/2019-ncov/if-you-are-sick/care-for-someone.html     [facility]: Interim Guidance for Hospital Discharge to Home: www.health.[facility].mn.us/diseases/coronavirus/hcp/hospdischarge.pdf    [facility] clinical trials (COVID-19 research studies): [email or web address]-clinical-trials     Below are the COVID-19 hotlines at the [facility] of [facility] ([facility]). Interpreters are available.   o For health questions: Call 198-350-0589 or 1-585.607.7140 (7 a.m. to 7 p.m.)  o For questions about schools and childcare: Call 048-949-7173 or 1-156.201.6252 (7 a.m. to 7 p.m.)                       Reason for Disposition    Ear pain    Additional Information    Negative: [1] Bloody  discharge AND [2] it followed ear trauma    Negative: [1] Followed head or face injury AND [2] clear or bloody fluid    Negative: [1] Unexplained bleeding AND [2] lasts > 10 minutes or large amount (Exception: if a few drops of blood, continue with triage)    Negative: Fever > 103 F (39.4 C)    Negative: Patient sounds very sick or weak to the triager    Negative: Diabetes mellitus or weak immune system (e.g., HIV positive, cancer chemo, splenectomy, organ transplant, chronic steroids)    Protocols used: EAR - [id number]

## 2021-07-21 NOTE — PROGRESS NOTES
"    Assessment & Plan     Infective otitis externa, right  Has had recurrent otitis externa for which she has seen ENT. Exam today with diffuse swelling of the EAC with debris in the canal obstructing my view of the TM. Will start ciprodex, follow up with ENT as previously scheduled.  - ciprofloxacin-dexamethasone (CIPRODEX) 0.3-0.1 % otic suspension; Place 4 drops into the right ear 2 times daily  - Otolaryngology Referral; Future       Patient Instructions   STart the ciprodex drops  Follow up with Dr. Ca  Let me know if things are not improving      No follow-ups on file.    Yolande Tovar, MARIO CNP  [facility]    Rajesh Mancia is a 26 year old who presents for the following health issues     HPI     Concern - possible ear drum rupture  Onset: happened last night -started bleeding but has possibly been infected x 1.5 weeks   Description: right ear   Intensity: moderate, severe  Progression of Symptoms:  worsening  Accompanying Signs & Symptoms: bleeding  Previous history of similar problem: has had 4 ear infections in the last few months   Precipitating factors:        Worsened by: none  Alleviating factors:        Improved by: none  Therapies tried and outcome:  she has seen ENT - was told it was a fungal infection.     Above HPI reviewed. Additionally, small amount of bleeding last night. No fevers. No URI symptoms.       Review of Systems   Constitutional, HEENT, cardiovascular, pulmonary, gi and gu systems are negative, except as otherwise noted.      Objective    /86 (BP Location: Left arm, Patient Position: Sitting, Cuff Size: Adult Large)   Pulse 76   Temp 99.3  F (37.4  C) (Tympanic)   Resp 14   Ht 1.702 m (5' 7\")   Wt 114.7 kg (252 lb 12.8 oz)   SpO2 99%   BMI 39.59 kg/m    Body mass index is 39.59 kg/m .  Physical Exam  Vitals and nursing note reviewed.   Constitutional:       General: She is not in acute distress.     Appearance: Normal appearance. "   HENT:      Head: Normocephalic and atraumatic.      Left Ear: Tympanic membrane and ear canal normal.      Ears:      Comments: Right EAC is erythematous, swollen. Small abrased area at distal portion of EAC. Purulent material in canal     Mouth/Throat:      Mouth: Mucous membranes are moist.   Cardiovascular:      Rate and Rhythm: Normal rate.   Pulmonary:      Effort: Pulmonary effort is normal.   Musculoskeletal:      Cervical back: Neck supple.   Skin:     General: Skin is warm and dry.   Neurological:      General: No focal deficit present.      Mental Status: She is alert.   Psychiatric:         Mood and Affect: Mood normal.         Behavior: Behavior normal.

## 2021-07-28 ENCOUNTER — HOSPITAL ENCOUNTER (OUTPATIENT)
Dept: PHYSICAL THERAPY | Facility: CLINIC | Age: 26
Setting detail: THERAPIES SERIES
End: 2021-07-28
Attending: PODIATRIST
Payer: COMMERCIAL

## 2021-07-28 PROCEDURE — 97110 THERAPEUTIC EXERCISES: CPT | Mod: GP | Performed by: PHYSICAL THERAPIST

## 2021-07-28 PROCEDURE — 97140 MANUAL THERAPY 1/> REGIONS: CPT | Mod: GP | Performed by: PHYSICAL THERAPIST

## 2021-07-30 NOTE — PROGRESS NOTES
Chief Complaint   Patient presents with     Ear Problem     seen on 7/21/21 for popping and bloody drainage noted on 7/20/21- started on ciprodex drops- c/o pain for 3 days but today no pain or drainage/audio     History of Present Illness   Blanche Lyman is a 26 year old female who presents to me today for follow up.  The patient was last seen in March 2021 with bilateral fungal otitis externa.  She returns today for follow up.     The patient reports developing a several day history of ear pressure/plugging/otalgia on the right-hand side.  On 7/20/2021, she had a pop in the ear and then some bloody otorrhea.  She was seen on 7/21/2020 and they noted some blood in the ear canal and possible perforation.  She was placed on antibiotic eardrops and instructed to follow-up with ENT.  After the drop treatment, the patient's ear has felt much better.  She had some significant decrease in hearing around the time of symptoms but this is resolved.  She currently denies any otalgia, otorrhea, bloody otorrhea, dizziness, vertigo. No history of ear surgery or chronic ear disease.     Past Medical History  There is no problem list on file for this patient.    Current Medications    Current Outpatient Medications:      levonorgestrel-ethinyl estradiol (NORDETTE) 0.15-30 MG-MCG tablet, , Disp: , Rfl:      ciprofloxacin-dexamethasone (CIPRODEX) 0.3-0.1 % otic suspension, Place 4 drops into the right ear 2 times daily (Patient not taking: Reported on 8/2/2021), Disp: 7.5 mL, Rfl: 0    Allergies  Allergies   Allergen Reactions     Dairy Products [Milk Protein Extract] Other (See Comments)     Headaches, stomach aches       Peanuts [Nuts] Other (See Comments)     Headaches, stomach aches       Wheat Gluten [Gluten Meal] Other (See Comments)     Headaches, stomach aches         Social History  Social History     Socioeconomic History     Marital status: Single     Spouse name: Not on file     Number of children: Not on file      Years of education: Not on file     Highest education level: Not on file   Occupational History     Not on file   Tobacco Use     Smoking status: Never Smoker     Smokeless tobacco: Never Used   Substance and Sexual Activity     Alcohol use: Yes     Comment: Maybe 2-3 drinks a week.     Drug use: Never     Sexual activity: Not Currently     Partners: Male     Birth control/protection: Pill   Other Topics Concern     Parent/sibling w/ CABG, MI or angioplasty before 65F 55M? No   Social History Narrative     Not on file     Social Determinants of Health     Financial Resource Strain:      Difficulty of Paying Living Expenses:    Food Insecurity:      Worried About Running Out of Food in the Last Year:      Ran Out of Food in the Last Year:    Transportation Needs:      Lack of Transportation (Medical):      Lack of Transportation (Non-Medical):    Physical Activity:      Days of Exercise per Week:      Minutes of Exercise per Session:    Stress:      Feeling of Stress :    Social Connections:      Frequency of Communication with Friends and Family:      Frequency of Social Gatherings with Friends and Family:      Attends [employer] Services:      Active Member of Clubs or Organizations:      Attends Club or Organization Meetings:      Marital Status:    Intimate Partner Violence:      Fear of Current or Ex-Partner:      Emotionally Abused:      Physically Abused:      Sexually Abused:        Family History  Family History   Problem Relation Age of Onset     Hypertension Father      Heart Disease Maternal Grandfather      Cancer Paternal Grandfather      Heart Disease Paternal Grandfather      Hypertension Paternal Grandfather      Hyperlipidemia Paternal Grandfather        Review of Systems  As per HPI and PMHx, otherwise 7 system review of the head and neck negative.    Physical Exam  BP (!) 139/96 (BP Location: Right arm, Patient Position: Chair, Cuff Size: Adult Large)   Pulse 78   Temp 98.4  F (36.9  C)  (Tympanic)   Wt 114.3 kg (252 lb)   BMI 39.47 kg/m    GENERAL: Patient is a pleasant, cooperative 26 year old female in no acute distress.  HEAD: Normocephalic, atraumatic.  Hair and scalp are normal.  EYES: Pupils are equal, round, reactive to light and accommodation.  Extraocular movements are intact.  The sclera nonicteric without injection.  The extraocular structures are normal.  EARS: Normal shape and symmetry.  No tenderness when palpating the mastoid or tragal areas bilaterally.  Otoscopic exam on the right reveals a mostly clear canal.  There is a slight amount of old blood in the inferior portion of the canal that was not obstructive.  The right tympanic membrane was round, intact without evidence of effusion.  No retraction, granulation, drainage, or evidence of cholesteatoma.  Otoscopic exam on the left reveals a clear canal.  The left tympanic membrane was round, intact without evidence of effusion.  No retraction, granulation, drainage, or evidence of cholesteatoma.    NEUROLOGIC: Cranial nerves II through XII are grossly intact.  Voice is strong.  Patient is House-Brackmann I/VI bilaterally.  CARDIOVASCULAR: Extremities are warm and well-perfused.  No significant peripheral edema.  RESPIRATORY: Patient has nonlabored breathing without cough, wheeze, stridor.  PSYCHIATRIC: Patient is alert and oriented.  Mood and affect appear normal.  SKIN: Warm and dry.  No scalp, face, or neck lesions noted.    Audiogram  The patient underwent an audiogram performed today.  My review of the audiogram shows normal hearing in both ears.  Pure-tone average is 12 dB on the right and 8 dB on the left.  Speech reception threshhold is 10 dB on the right and 10 dB on the left.  The patient had 100% word recognition on the right and 100% word recognition on the left.  The patient had a type A tympanogram on the right and a type A tympanogram on the left.    Assessment and Plan     ICD-10-CM    1. History of acute otitis  media  Z86.69 Adult Audiology Referral   2. History of acute otitis externa  Z86.69 Adult Audiology Referral   3. Normal ear exam  Z01.10 Adult Audiology Referral   4. Normal hearing exam  Z01.10 Adult Audiology Referral     It was my pleasure seeing Blanche Lyman today in clinic.  The patient presents with sounds to be a recent right acute otitis media that led to tympanic membrane perforation, rupture, and drainage.  She was treated appropriately with eardrops and it appears that the right tympanic membrane is completely healed.  There is no signs of active otorrhea or infection.  Her hearing examination is normal.  At this point in time, I would recommend observation.  If this becomes a recurrent issue, we can consider discussing ear tube placement.  There are some inherent risks with ear tube placement including need for observation, potential need for additional ear tubes, risk for tympanic membrane perforation, etc.  I think that this episode was different than the fungal otitis externa that she was having previously.  Given how good the ear and hearing looks today, observation is warranted.      The patient will follow up as needed in the future with ear issues.  I encouraged her to contact me if she again developed some ear plugging, fullness, or drainage and I be happy to see her as soon as able.  She can message me on AntonitRachel Donohuethia to follow up with Primary Care provider regarding elevated blood pressure.    Aniceto Ca MD  Department of Otolarygology-Head and Neck Surgery  [facility]

## 2021-08-02 ENCOUNTER — OFFICE VISIT (OUTPATIENT)
Dept: OTOLARYNGOLOGY | Facility: CLINIC | Age: 26
End: 2021-08-02
Payer: COMMERCIAL

## 2021-08-02 ENCOUNTER — OFFICE VISIT (OUTPATIENT)
Dept: AUDIOLOGY | Facility: CLINIC | Age: 26
End: 2021-08-02
Payer: COMMERCIAL

## 2021-08-02 ENCOUNTER — MYC MEDICAL ADVICE (OUTPATIENT)
Dept: FAMILY MEDICINE | Facility: CLINIC | Age: 26
End: 2021-08-02

## 2021-08-02 VITALS
DIASTOLIC BLOOD PRESSURE: 96 MMHG | SYSTOLIC BLOOD PRESSURE: 139 MMHG | WEIGHT: 252 LBS | BODY MASS INDEX: 39.47 KG/M2 | TEMPERATURE: 98.4 F | HEART RATE: 78 BPM

## 2021-08-02 DIAGNOSIS — Z01.10 NORMAL EAR EXAM: ICD-10-CM

## 2021-08-02 DIAGNOSIS — Z86.69 HISTORY OF ACUTE OTITIS EXTERNA: ICD-10-CM

## 2021-08-02 DIAGNOSIS — Z86.69 HISTORY OF ACUTE OTITIS MEDIA: Primary | ICD-10-CM

## 2021-08-02 DIAGNOSIS — H92.01 OTALGIA, RIGHT: Primary | ICD-10-CM

## 2021-08-02 DIAGNOSIS — Z01.10 NORMAL HEARING EXAM: ICD-10-CM

## 2021-08-02 PROCEDURE — 99213 OFFICE O/P EST LOW 20 MIN: CPT | Performed by: OTOLARYNGOLOGY

## 2021-08-02 PROCEDURE — 99207 PR NO CHARGE LOS: CPT | Performed by: AUDIOLOGIST

## 2021-08-02 PROCEDURE — 92550 TYMPANOMETRY & REFLEX THRESH: CPT | Performed by: AUDIOLOGIST

## 2021-08-02 PROCEDURE — 92557 COMPREHENSIVE HEARING TEST: CPT | Performed by: AUDIOLOGIST

## 2021-08-02 ASSESSMENT — PAIN SCALES - GENERAL: PAINLEVEL: NO PAIN (0)

## 2021-08-02 NOTE — NURSING NOTE
"Initial BP (!) 139/96 (BP Location: Right arm, Patient Position: Chair, Cuff Size: Adult Large)   Pulse 78   Temp 98.4  F (36.9  C) (Tympanic)   Wt 114.3 kg (252 lb)   BMI 39.47 kg/m   Estimated body mass index is 39.47 kg/m  as calculated from the following:    Height as of 7/21/21: 1.702 m (5' 7\").    Weight as of this encounter: 114.3 kg (252 lb). .    Marina Michelle LPN    "

## 2021-08-02 NOTE — PROGRESS NOTES
AUDIOLOGY REPORT:    Patient was referred from ENT by Dr. Ca for audiology evaluation. The patient reports that she may have recently ruptured her right eardrum. She initially had pain, bleeding, and decreased hearing in the right ear. Symptoms have mostly resolved. The patient reports a recent history of otitis externa in both ears. She also reports a history of frequent ear infections as a child, but notes that she did not have ear tubes or any other ear surgery. She did not have previous concerns about hearing. 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    Testing:    Otoscopy:   Otoscopic exam indicates ears are clear of cerumen bilaterally     Tympanograms:    RIGHT: normal eardrum mobility     LEFT:   normal eardrum mobility    Reflexes (reported by stimulus ear):  RIGHT: Ipsilateral is present at normal levels  RIGHT: Contralateral is present at normal levels  LEFT:   Ipsilateral is present at normal levels  LEFT:   Contralateral is present at normal levels    Thresholds:   Pure Tone Thresholds assessed using conventional audiometry with good reliability from 250-8000 Hz bilaterally using insert earphones and circumaural headphones     RIGHT:  normal hearing sensitivity at all tested frequencies     LEFT:    normal hearing sensitivity at all tested frequencies     Speech Reception Threshold:    RIGHT: 10 dB HL    LEFT:   10 dB HL  Results are in agreement with pure tone average.     Word Recognition Score:     RIGHT: 100% at 50 dB HL using NU-6 recorded word list.    LEFT:   100% at 50 dB HL using NU-6 recorded word list.    Discussed results with the patient.     Patient was returned to ENT for follow up.     Douglas Sharpe, CCC-A  Licensed Audiologist #[id number]  8/2/2021

## 2021-08-02 NOTE — LETTER
8/2/2021         RE: Blanche Lyman  [address] [address]        Dear Colleague,    Thank you for referring your patient, Blanche Lyman, to the [facility]. Please see a copy of my visit note below.    Chief Complaint   Patient presents with     Ear Problem     seen on 7/21/21 for popping and bloody drainage noted on 7/20/21- started on ciprodex drops- c/o pain for 3 days but today no pain or drainage/audio     History of Present Illness   Blanche Lyman is a 26 year old female who presents to me today for follow up.  The patient was last seen in March 2021 with bilateral fungal otitis externa.  She returns today for follow up.     The patient reports developing a several day history of ear pressure/plugging/otalgia on the right-hand side.  On 7/20/2021, she had a pop in the ear and then some bloody otorrhea.  She was seen on 7/21/2020 and they noted some blood in the ear canal and possible perforation.  She was placed on antibiotic eardrops and instructed to follow-up with ENT.  After the drop treatment, the patient's ear has felt much better.  She had some significant decrease in hearing around the time of symptoms but this is resolved.  She currently denies any otalgia, otorrhea, bloody otorrhea, dizziness, vertigo. No history of ear surgery or chronic ear disease.     Past Medical History  There is no problem list on file for this patient.    Current Medications    Current Outpatient Medications:      levonorgestrel-ethinyl estradiol (NORDETTE) 0.15-30 MG-MCG tablet, , Disp: , Rfl:      ciprofloxacin-dexamethasone (CIPRODEX) 0.3-0.1 % otic suspension, Place 4 drops into the right ear 2 times daily (Patient not taking: Reported on 8/2/2021), Disp: 7.5 mL, Rfl: 0    Allergies  Allergies   Allergen Reactions     Dairy Products [Milk Protein Extract] Other (See Comments)     Headaches, stomach aches       Peanuts [Nuts] Other (See Comments)     Headaches, stomach  aches       Wheat Gluten [Gluten Meal] Other (See Comments)     Headaches, stomach aches         Social History  Social History     Socioeconomic History     Marital status: Single     Spouse name: Not on file     Number of children: Not on file     Years of education: Not on file     Highest education level: Not on file   Occupational History     Not on file   Tobacco Use     Smoking status: Never Smoker     Smokeless tobacco: Never Used   Substance and Sexual Activity     Alcohol use: Yes     Comment: Maybe 2-3 drinks a week.     Drug use: Never     Sexual activity: Not Currently     Partners: Male     Birth control/protection: Pill   Other Topics Concern     Parent/sibling w/ CABG, MI or angioplasty before 65F 55M? No   Social History Narrative     Not on file     Social Determinants of Health     Financial Resource Strain:      Difficulty of Paying Living Expenses:    Food Insecurity:      Worried About Running Out of Food in the Last Year:      Ran Out of Food in the Last Year:    Transportation Needs:      Lack of Transportation (Medical):      Lack of Transportation (Non-Medical):    Physical Activity:      Days of Exercise per Week:      Minutes of Exercise per Session:    Stress:      Feeling of Stress :    Social Connections:      Frequency of Communication with Friends and Family:      Frequency of Social Gatherings with Friends and Family:      Attends [employer] Services:      Active Member of Clubs or Organizations:      Attends Club or Organization Meetings:      Marital Status:    Intimate Partner Violence:      Fear of Current or Ex-Partner:      Emotionally Abused:      Physically Abused:      Sexually Abused:        Family History  Family History   Problem Relation Age of Onset     Hypertension Father      Heart Disease Maternal Grandfather      Cancer Paternal Grandfather      Heart Disease Paternal Grandfather      Hypertension Paternal Grandfather      Hyperlipidemia Paternal Grandfather         Review of Systems  As per HPI and PMHx, otherwise 7 system review of the head and neck negative.    Physical Exam  BP (!) 139/96 (BP Location: Right arm, Patient Position: Chair, Cuff Size: Adult Large)   Pulse 78   Temp 98.4  F (36.9  C) (Tympanic)   Wt 114.3 kg (252 lb)   BMI 39.47 kg/m    GENERAL: Patient is a pleasant, cooperative 26 year old female in no acute distress.  HEAD: Normocephalic, atraumatic.  Hair and scalp are normal.  EYES: Pupils are equal, round, reactive to light and accommodation.  Extraocular movements are intact.  The sclera nonicteric without injection.  The extraocular structures are normal.  EARS: Normal shape and symmetry.  No tenderness when palpating the mastoid or tragal areas bilaterally.  Otoscopic exam on the right reveals a mostly clear canal.  There is a slight amount of old blood in the inferior portion of the canal that was not obstructive.  The right tympanic membrane was round, intact without evidence of effusion.  No retraction, granulation, drainage, or evidence of cholesteatoma.  Otoscopic exam on the left reveals a clear canal.  The left tympanic membrane was round, intact without evidence of effusion.  No retraction, granulation, drainage, or evidence of cholesteatoma.    NEUROLOGIC: Cranial nerves II through XII are grossly intact.  Voice is strong.  Patient is House-Brackmann I/VI bilaterally.  CARDIOVASCULAR: Extremities are warm and well-perfused.  No significant peripheral edema.  RESPIRATORY: Patient has nonlabored breathing without cough, wheeze, stridor.  PSYCHIATRIC: Patient is alert and oriented.  Mood and affect appear normal.  SKIN: Warm and dry.  No scalp, face, or neck lesions noted.    Audiogram  The patient underwent an audiogram performed today.  My review of the audiogram shows normal hearing in both ears.  Pure-tone average is 12 dB on the right and 8 dB on the left.  Speech reception threshhold is 10 dB on the right and 10 dB on the left.   The patient had 100% word recognition on the right and 100% word recognition on the left.  The patient had a type A tympanogram on the right and a type A tympanogram on the left.    Assessment and Plan     ICD-10-CM    1. History of acute otitis media  Z86.69 Adult Audiology Referral   2. History of acute otitis externa  Z86.69 Adult Audiology Referral   3. Normal ear exam  Z01.10 Adult Audiology Referral   4. Normal hearing exam  Z01.10 Adult Audiology Referral     It was my pleasure seeing Blanche Lyman today in clinic.  The patient presents with sounds to be a recent right acute otitis media that led to tympanic membrane perforation, rupture, and drainage.  She was treated appropriately with eardrops and it appears that the right tympanic membrane is completely healed.  There is no signs of active otorrhea or infection.  Her hearing examination is normal.  At this point in time, I would recommend observation.  If this becomes a recurrent issue, we can consider discussing ear tube placement.  There are some inherent risks with ear tube placement including need for observation, potential need for additional ear tubes, risk for tympanic membrane perforation, etc.  I think that this episode was different than the fungal otitis externa that she was having previously.  Given how good the ear and hearing looks today, observation is warranted.      The patient will follow up as needed in the future with ear issues.  I encouraged her to contact me if she again developed some ear plugging, fullness, or drainage and I be happy to see her as soon as able.  She can message me on IanhartRachel    Blanche to follow up with Primary Care provider regarding elevated blood pressure.    Aniceto Ca MD  Department of Otolarygology-Head and Neck Surgery  [facility]         Again, thank you for allowing me to participate in the care of your patient.        Sincerely,        Aniceto Ca MD

## 2021-08-04 ENCOUNTER — MYC MEDICAL ADVICE (OUTPATIENT)
Dept: FAMILY MEDICINE | Facility: CLINIC | Age: 26
End: 2021-08-04

## 2021-08-11 ENCOUNTER — HOSPITAL ENCOUNTER (OUTPATIENT)
Dept: PHYSICAL THERAPY | Facility: CLINIC | Age: 26
Setting detail: THERAPIES SERIES
End: 2021-08-11
Attending: PODIATRIST
Payer: COMMERCIAL

## 2021-08-11 PROCEDURE — 97110 THERAPEUTIC EXERCISES: CPT | Mod: GP | Performed by: PHYSICAL THERAPIST

## 2021-08-11 PROCEDURE — 97140 MANUAL THERAPY 1/> REGIONS: CPT | Mod: GP | Performed by: PHYSICAL THERAPIST

## 2021-08-11 NOTE — PROGRESS NOTES
Outpatient Physical Therapy Progress Note     Patient: Blanche Lyman  : 1995    Beginning/End Dates of Reporting Period:  21 to 21    Referring Provider: Ryan Charlton Diagnosis: Sprain of Ankle Ligaments, Peroneal Tendonitis.      Client Self Report: Towards end of day, really hurting. Notusing brace very much, hurts more w/ brace on..     Objective Measurements:  Objective Measure: LEFS   Details: 21  Score 53/80.  INITIALLY:  41/80    Objective Measure: AROM  Details: 21 PF L 54, DF 15; DF w/ knee bent 16. INITIALlY:  PF L 41, R 60, DF L 4, R 14; DF w/ knee bent L 13, R 19.      Objective Measure: MMT  Details: 21  5 for all movements and no pain.  INITIALLY:  L DF 4 and Pful, More painful w/ Inversion vs. Eversion and 4+/5.     Objective Measure: Palpation   Details: 21  Deltoid, but not Ligaments laterally.  INITIALLY:  Tender Deltoid, Lateral Malleoli all ligaments.      Goals:  Goal Identifier 1   Goal Description STG: Pt will be able to walk 2 blocks w/ moderate difficulty MET    Target Date 21   Date Met  21   Progress (detail required for progress note):     Goal Identifier 2   Goal Description STG: Pt will be able to do stairs normally and w/ minimal difficulty.  - MET    Target Date 21   Date Met  21   Progress (detail required for progress note):     Goal Identifier 3   Goal Description STG: Pt will be able to transition to a brace for better ability to navigate stairs and walking more normally.  MET    Target Date     Date Met  21   Progress (detail required for progress note):     Goal Identifier 4   Goal Description LTG: Pt will be independent w/ HEP and self cares to manage Severe L Ankle Sprain.    Target Date 21   Date Met      Progress (detail required for progress note):     Plan:  Continue therapy per current plan of care.    Discharge:  No  RECERTIFICATION    Blanche Lyman  1995  MRN:  [id number]    Session Number: 5/8 since start of care.    Diagnosis: Sprain of Ankle Ligaments, Peroneal Tendonitis.   Onset Date: 5/22/21   Start of Care: 6/30/21    Reasons for Continuing Treatment:   Will need to totally complete HEP.     Frequency/Duration  1 times per week for 5 weeks for a total of 6 additional visits.    Recertification Period  8/11/21  - 9/16/21     Physician Signature:    Date:    X_______________________________________________________    Physician Name: Shravan Charlton     I certify the need for these services furnished under this plan of treatment and while under my care. Physician co-signature of this document indicates review and certification of the therapy plan.  This signature may be written on paper, or electronically signed within [facility].     Serena Magana, PT, MTC (#[id number])  [facility]           [phone]  Fax          [phone]  Appt #      [phone]

## 2021-08-12 ENCOUNTER — VIRTUAL VISIT (OUTPATIENT)
Dept: FAMILY MEDICINE | Facility: CLINIC | Age: 26
End: 2021-08-12
Payer: COMMERCIAL

## 2021-08-12 DIAGNOSIS — F33.0 MAJOR DEPRESSIVE DISORDER, RECURRENT EPISODE, MILD (H): Primary | ICD-10-CM

## 2021-08-12 DIAGNOSIS — F51.04 CHRONIC INSOMNIA: ICD-10-CM

## 2021-08-12 PROCEDURE — 99214 OFFICE O/P EST MOD 30 MIN: CPT | Mod: GT | Performed by: NURSE PRACTITIONER

## 2021-08-12 RX ORDER — VENLAFAXINE HYDROCHLORIDE 37.5 MG/1
37.5 CAPSULE, EXTENDED RELEASE ORAL DAILY
Qty: 30 CAPSULE | Refills: 1 | Status: SHIPPED | OUTPATIENT
Start: 2021-08-12 | End: 2021-09-09

## 2021-08-12 RX ORDER — TRAZODONE HYDROCHLORIDE 50 MG/1
50 TABLET, FILM COATED ORAL AT BEDTIME
Qty: 30 TABLET | Refills: 1 | Status: SHIPPED | OUTPATIENT
Start: 2021-08-12 | End: 2021-10-01

## 2021-08-12 ASSESSMENT — PATIENT HEALTH QUESTIONNAIRE - PHQ9
SUM OF ALL RESPONSES TO PHQ QUESTIONS 1-9: 8
5. POOR APPETITE OR OVEREATING: NOT AT ALL

## 2021-08-12 ASSESSMENT — ANXIETY QUESTIONNAIRES
5. BEING SO RESTLESS THAT IT IS HARD TO SIT STILL: NOT AT ALL
1. FEELING NERVOUS, ANXIOUS, OR ON EDGE: MORE THAN HALF THE DAYS
2. NOT BEING ABLE TO STOP OR CONTROL WORRYING: NOT AT ALL
GAD7 TOTAL SCORE: 3
6. BECOMING EASILY ANNOYED OR IRRITABLE: SEVERAL DAYS
3. WORRYING TOO MUCH ABOUT DIFFERENT THINGS: NOT AT ALL
7. FEELING AFRAID AS IF SOMETHING AWFUL MIGHT HAPPEN: NOT AT ALL

## 2021-08-12 NOTE — PROGRESS NOTES
Blanche is a 26 year old who is being evaluated via a billable video visit.      How would you like to obtain your AVS? MyChart  If the video visit is dropped, the invitation should be resent by: Text to cell phone: [phone]  Will anyone else be joining your video visit? No    Video Start Time: 828  Not able to connect with [facility]  doximity not able to connect  TV completed instead    Assessment & Plan     Major depressive disorder, recurrent episode, mild (H)  Recent exacerbation  Make appt for labs  Start new meds  Call and schedule therapy appt.   Make appt with psychiatry for medication management discussion.   - **CBC with platelets differential FUTURE 2mo; Future  - **Vitamin D Deficiency FUTURE 2mo; Future  - **TSH with free T4 reflex FUTURE 2mo; Future  - **Comprehensive metabolic panel FUTURE 2mo; Future  - MENTAL HEALTH REFERRAL  - Adult; Psychiatry; Psychiatry; Collaborative Care Psychiatry Service/Bridge to Long-Term Psychiatry as indicated ([phone]); Yes; Chronic Mental Health without improvement; Yes; We will contact you to schedule the a...; Future  - venlafaxine (EFFEXOR-XR) 37.5 MG 24 hr capsule; Take 1 capsule (37.5 mg) by mouth daily    Chronic insomnia  Ongoing and chronic with recent exacerbation   Symptomatic care stratagies reviewed.   - MENTAL HEALTH REFERRAL  - Adult; Psychiatry; Psychiatry; Collaborative Care Psychiatry Service/Bridge to Long-Term Psychiatry as indicated ([phone]); Yes; Chronic Mental Health without improvement; Yes; We will contact you to schedule the a...; Future  - traZODone (DESYREL) 50 MG tablet; Take 1 tablet (50 mg) by mouth At Bedtime      30 minutes spent on the date of the encounter doing chart review, history and exam, documentation and further activities per the note    Call or return to the clinic with any worsening of symptoms or no resolution. Patient/Parent verbalized understanding and is in agreement. Medication side effects reviewed.    Current Outpatient Medications   Medication Sig Dispense Refill     levonorgestrel-ethinyl estradiol (NORDETTE) 0.15-30 MG-MCG tablet        traZODone (DESYREL) 50 MG tablet Take 1 tablet (50 mg) by mouth At Bedtime 30 tablet 1     venlafaxine (EFFEXOR-XR) 37.5 MG 24 hr capsule Take 1 capsule (37.5 mg) by mouth daily 30 capsule 1   .  As the provider for this telephone/video service, I attest that I introduced myself to the patient, provided my credentials, disclosed my location, and determined that, based on a review of the patient's chart and/or a discussion with members of the patient's treatment team, a telephone/video visit is an appropriate and effective means of providing this service. The patient and I mutually agree that this visit is appropriate for telephone/video visit as well.      Return in about 1 month (around 9/12/2021) for Mental Health.    MARIO [facility]    Rajesh Mancia is a 26 year old who presents for the following health issues     HPI     Depression Followup    How are you doing with your depression since your last visit? No change-had been on zoloft but has been off of it for about 3-4 weeks     Doubled dose in the ER in April    Made her numb didn't like that feeling. Stopped taking about 3-4 weeks ago    Has been having mood swings and irritability.     Feels able to connect     Therapy in the past for a few weeks. Find it may have been kind of helpful but really did not feel a good connection.    Would like to try something for sleep and depression/anxiety.    effexor is something she feels she would like to try.         Are you having other symptoms that might be associated with depression? No    Have you had a significant life event?  No     Are you feeling anxious or having panic attacks?   No    Do you have any concerns with your use of alcohol or other drugs? No     Melatonin 10 mg took for 2-3 weeks.taking daily    No  change in sleep pattern when she was taking it.     No suicidal or homicidal ideation    Has support in place. .. Ashley... has been to ED before when she did not feel safe     Safety plan reviewed today.             Social History     Tobacco Use     Smoking status: Never Smoker     Smokeless tobacco: Never Used   Substance Use Topics     Alcohol use: Yes     Comment: Maybe 2-3 drinks a week.     Drug use: Never     PHQ 11/27/2020 1/27/2021 4/23/2021   PHQ-9 Total Score 16 2 5   Q9: Thoughts of better off dead/self-harm past 2 weeks Several days Not at all Not at all   F/U: Thoughts of suicide or self-harm Yes - -   F/U: Self harm-plan Yes - -   F/U: Self-harm action No - -   F/U: Safety concerns No - -     MIGUEL-7 SCORE 1/27/2021 4/23/2021   Total Score 0 (minimal anxiety) -   Total Score 0 1     Last PHQ-9 8/12/2021   1.  Little interest or pleasure in doing things 1   2.  Feeling down, depressed, or hopeless 1   3.  Trouble falling or staying asleep, or sleeping too much 2   4.  Feeling tired or having little energy 2   5.  Poor appetite or overeating 0   6.  Feeling bad about yourself 2   7.  Trouble concentrating 0   8.  Moving slowly or restless 0   Q9: Thoughts of better off dead/self-harm past 2 weeks 0   PHQ-9 Total Score 8   Difficulty at work, home, or with people -   In the past two weeks have you had thoughts of suicide or self harm? -   Do you have concerns about your personal safety or the safety of others? -   In the past 2 weeks have you thought about a plan or had intention to harm yourself? -   In the past 2 weeks have you acted on these thoughts in any way? -     MIGUEL-7  8/12/2021   1. Feeling nervous, anxious, or on edge 2   2. Not being able to stop or control worrying 0   3. Worrying too much about different things 0   4. Trouble relaxing 0   5. Being so restless that it is hard to sit still 0   6. Becoming easily annoyed or irritable 1   7. Feeling afraid, as if something awful might happen 0    MIGUEL-7 Total Score 3   If you checked any problems, how difficult have they made it for you to do your work, take care of things at home, or get along with other people? -       Suicide Assessment Five-step Evaluation and Treatment (SAFE-T)        How many servings of fruits and vegetables do you eat daily?  4 or more    On average, how many sweetened beverages do you drink each day (Examples: soda, juice, sweet tea, etc.  Do NOT count diet or artificially sweetened beverages)?   0    How many days per week do you exercise enough to make your heart beat faster? 3 or less    How many minutes a day do you exercise enough to make your heart beat faster? 10 - 19    How many days per week do you miss taking your medication? 0        Review of Systems   Constitutional, HEENT, cardiovascular, pulmonary, GI, , musculoskeletal, neuro, skin, endocrine and psych systems are negative, except as otherwise noted.      Objective           Vitals:  No vitals were obtained today due to virtual visit.    Physical Exam   GENERAL: Healthy, alert and no distress  RESP: No audible wheeze, cough  NEURO:   Mentation and speech appear appropriate for age.  PSYCH: Mentation appears normal, affect normal/bright, judgement and insight intact, normal speech                 Video-Visit Details    Type of service:  Video Visit    Video End Time:855    Originating Location (pt. Location): Home    Distant Location (provider location):  [facility]     Platform used for Video Visit: Vicky

## 2021-08-12 NOTE — PATIENT INSTRUCTIONS
Lori Manuel  Pre-Licensed Professional, MS [facility] [place] [facility]   [phone]  [address] [address] [place] [id number]  [phone]        Patient Education     Counseling for Depression  For some people, counseling can work as well as medicine for mild to moderate depression. Counseling is also called talk therapy. When done by a trained professional, this treatment is a powerful way to better understand your thoughts and feelings. Like medicine, it may take time before you notice how much counseling is helping.     Kinds of talk therapy  Different counselors use different methods for talk therapy. But all therapy aims to help change how you think about your problem. Most therapy for depression is often done one-on-one. But it may also be done in a group setting. You and your healthcare provider can discuss the type of therapy you think would work best for you. You can also discuss who the best person is to provide the therapy.   How therapy helps  Talking about your problems can help them seem less overwhelming. It can help work through problems you have with your life and your relationships. It can also help you understand how depression is clouding your thinking, not letting you see the world the way it really is. Therapy can give you:     Insight about your emotions    New tools for dealing with your problems    Emotional support for making progress  Getting better takes time  Talk therapy can help you feel better. But change doesn t happen right away. Depression takes away your energy and motivation. So it can be hard to feel like going to therapy and sticking with it. But therapy has been proven to be very valuable in the treatment of depression. Therapy for depression is often done for a set number of sessions. In other cases, you and your therapist decide together at what point you no longer need therapy.   Additional sources of help  In addition to a professional counselor, it may help to talk to other people in  your life. You may find support and insight from:     A close friend or family member    A  trained in counseling    A local support group or community group    A 12-step program such as Alcoholics Anonymous for dealing with problems that can contribute to depression, such as alcohol or drug addiction  [employer] last reviewed this educational content on 9/1/2019 2000-2021 [employer]. All rights reserved. This information is not intended as a substitute for professional medical care. Always follow your healthcare professional's instructions.           Patient Education     Depression: Tips to Help Yourself    As your healthcare providers help treat your depression, you can also help yourself. Keep in mind that your illness affects you emotionally, physically, mentally, and socially. So full recovery will take time. Take care of your body and your soul, and be patient with yourself as you get better.  Self-care    Educate yourself. Read about treatment and medicine options. If you have the energy, attend local conferences or support groups. Keep a list of useful websites and helpful books and use them as needed. This illness is not your fault. Don t blame yourself for your depression.    Manage early symptoms. If you notice symptoms returning, experience triggers, or identify other factors that may lead to a depressive episode, get help as soon as possible. Ask trusted friends and family to monitor your behavior and let you know if they see anything of concern.    Work with your provider. Find a provider you can trust. Communicate honestly with that person and share information on your treatment for depression and your reaction to medicines.    Be prepared for a crisis. Know what to do if you experience a crisis. Keep the phone number of a crisis hotline and know the location of your community's urgent care centers and the closest emergency department.    Hold off on big decisions.  Depression can cloud your judgment. So wait until you feel better before making major life decisions, such as changing jobs, moving, or getting  or .    Be patient. Recovering from depression is a process. Don t be discouraged if it takes some time to feel better.    Keep it simple. Depression saps your energy and concentration. So you won t be able to do all the things you used to do. Set small goals and do what you can.    Be with others. Don t isolate yourself--you ll only feel worse. Try to be with other people. And take part in fun activities when you can. Go to a movie, ballgame, [employer] service, or social event. Talk openly with people you can trust. And accept help when it s offered.    Take care of your body  People with depression often lose the desire to take care of themselves. That only makes their problems worse. During treatment and afterward, make a point to:    Exercise. It s a great way to take care of your body. And studies have shown that exercise helps fight depression. Aim for 30 minutes of moderate activity a day. Walking in small blocks of time (5-10 minutes) is a good way to start, but anything that gets you moving (gardening, house cleaning) counts.    Don't use drugs and alcohol. These may ease the pain in the short term. But they ll only make your problems worse in the long run.    Get relief from stress. Ask your healthcare provider for relaxation exercises and techniques to help relieve stress. Consider activities like meditation, yoga, or Emeka Chi.    Eat right. A balanced and healthy diet helps keep your body healthy.    Get adequate sleep. Aim for 8 hours per night. Too much or too little sleep can cause other physical and emotional problems.  [employer] last reviewed this educational content on 12/1/2019 2000-2021 [employer]. All rights reserved. This information is not intended as a substitute for professional medical care. Always follow your  "healthcare professional's instructions.           Patient Education     Depression and the Brain s Chemical Balance  Everyone feels sad from time to time. But depression is much more serious than just feeling down. Depression is a real illness, just like diabetes or heart disease. And just like those illnesses, depression is not something a person can \"snap out of.\" It's believed that a combination of genetic, biological, environmental, and psychological factors cause depression.  Chemical changes in the brain may contribute to the symptoms of the disease.     In a normal message pattern, messages travel smoothly between nerve cells in the brain.       A change in chemicals in the brain can interfere with how messages are sent. This is one cause of depression.      Brain chemicals and depression  The brain is a complex organ. It controls all the workings of your body, including your emotions. It does this by using messages that travel from one nerve cell to another, and from one brain region to another. Brain messages travel with help from chemicals. These are called neurotransmitters. No one knows exactly what happens in the brain to cause depression. But we do know that neurotransmitters are involved.  Changes in the brain  Two neurotransmitters are mainly involved in depression. These are norepinephrine and serotonin. Antidepressants and talk therapy are the main treatments for depression. Both change the levels of these neurotransmitters. In many cases, this relieves depression symptoms.  [employer] last reviewed this educational content on 9/1/2019 2000-2021 [employer]. All rights reserved. This information is not intended as a substitute for professional medical care. Always follow your healthcare professional's instructions.         General recommendations for sleep problems (Insomnia)   Allow 2-4 weeks to see results   Establish a regular sleep schedule   Go to bed at same time each night " (when you think you can sleep)   Get up at same time each day   Avoid sleeping-in on Sunday morning   Cut down time in bed (if not asleep, get up)   Avoid trying to force yourself to sleep   Use your bed only for sleep and sex   Do not read or watch Television in bed   Make the bedroom comfortable   Keep temperature in your bedroom comfortable   Keep bedroom quiet when sleeping   Consider ear plugs (silicon)   Keep bedroom dark enough   Use dark blinds or wear an eye mask if needed   Relax at bedtime   Relax each muscle group individually   Begin with your feet   Work toward your head   Deal with your worries before bedtime   Set aside a worry time for 30 minutes earlier   Listen to relaxation tapes   Classical Music or Nature sounds   Imagine a tranquil scene (e.g. waterfall or beach)   Back Massage   Gentle 5-minute back rub prior to bedtime   Perform measures to make you tired at bedtime   Get regular Exercise each day (6 hours before bedtime)   Take medications only as directed   Eat a light bedtime snack or warm drink   Warm milk   Warm herbal tea (non-caffeinated)   Special Therapy Measures   Sleep Restriction Therapy   Limit time in bed for 15 minutes more than sleep   Do not set limit under 4 hours   Increase time by 15 minutes per effective sleep trial   Effective sleep suggests >90% of bedtime asleep   Stimulus Control   Do not lie awake for more than 30 minutes   Get out of bed and perform a quiet activity   Return to bed when sleepy   Repeat as many times per night as needed   No Napping during day   Things to avoid   Do not Exercise just before bedtime   No overstimulating activities just before bed   No competitive games before bedtime   No exciting Television programs before bedtime   Avoid caffeine after lunchtime   Avoid chocolate   Avoid coffee, tea, or soda   Do not use alcohol to induce sleep (worsens Insomnia)   Do not take someone else's sleeping pills   Do not look at the clock when awakening    Do not turn on light when getting up to use bathroom   Read the book   Say Good Night To Insomnia

## 2021-08-13 ASSESSMENT — ANXIETY QUESTIONNAIRES: GAD7 TOTAL SCORE: 3

## 2021-08-18 ENCOUNTER — OFFICE VISIT (OUTPATIENT)
Dept: OTOLARYNGOLOGY | Facility: CLINIC | Age: 26
End: 2021-08-18
Payer: COMMERCIAL

## 2021-08-18 VITALS
HEART RATE: 66 BPM | WEIGHT: 252 LBS | DIASTOLIC BLOOD PRESSURE: 93 MMHG | BODY MASS INDEX: 39.55 KG/M2 | SYSTOLIC BLOOD PRESSURE: 138 MMHG | TEMPERATURE: 98.5 F | HEIGHT: 67 IN

## 2021-08-18 DIAGNOSIS — B36.9 OTITIS EXTERNA, FUNGAL, RIGHT EAR: Primary | ICD-10-CM

## 2021-08-18 DIAGNOSIS — H92.01 OTALGIA, RIGHT: ICD-10-CM

## 2021-08-18 DIAGNOSIS — H61.21 IMPACTED CERUMEN OF RIGHT EAR: ICD-10-CM

## 2021-08-18 DIAGNOSIS — H62.41 OTITIS EXTERNA, FUNGAL, RIGHT EAR: Primary | ICD-10-CM

## 2021-08-18 PROCEDURE — 99213 OFFICE O/P EST LOW 20 MIN: CPT | Mod: 25 | Performed by: OTOLARYNGOLOGY

## 2021-08-18 PROCEDURE — 69210 REMOVE IMPACTED EAR WAX UNI: CPT | Performed by: OTOLARYNGOLOGY

## 2021-08-18 RX ORDER — PREDNISOLONE ACETATE 10 MG/ML
3 SUSPENSION/ DROPS OPHTHALMIC 2 TIMES DAILY
Qty: 10 ML | Refills: 0 | Status: SHIPPED | OUTPATIENT
Start: 2021-08-18 | End: 2021-08-28

## 2021-08-18 RX ORDER — CLOTRIMAZOLE 1 G/ML
SOLUTION TOPICAL
Qty: 10 ML | Refills: 0 | Status: SHIPPED | OUTPATIENT
Start: 2021-08-18 | End: 2021-12-02

## 2021-08-18 ASSESSMENT — MIFFLIN-ST. JEOR: SCORE: 1920.69

## 2021-08-18 NOTE — PATIENT INSTRUCTIONS
Per physician instructions      If you have questions or concerns on any instructions given to you by your provider today or if you need to schedule an appointment, you can reach us at [phone].

## 2021-08-18 NOTE — NURSING NOTE
"Initial BP (!) 138/93 (BP Location: Right arm, Patient Position: Sitting, Cuff Size: Adult Large)   Pulse 66   Temp 98.5  F (36.9  C) (Tympanic)   Ht 1.702 m (5' 7\")   Wt 114.3 kg (252 lb)   BMI 39.47 kg/m   Estimated body mass index is 39.47 kg/m  as calculated from the following:    Height as of this encounter: 1.702 m (5' 7\").    Weight as of this encounter: 114.3 kg (252 lb). .    Mar Mendes CMA    "

## 2021-08-18 NOTE — PROGRESS NOTES
Chief Complaint   Patient presents with     Ear Problem     follow up right ear otagia/still having issues/possible tube      History of Present Illness  Blanche Lyman is a 26 year old female who presents today for follow-up.  The patient was last seen on 8/2/2021 for follow-up.  He was recently diagnosed with otitis media/externa.  She was placed on antibiotic eardrops at that time.  When I saw the patient, she did not have any signs of active otitis media or externa.  She contacted our office today as she has had a several day history of right ear discomfort.  She feels like the hearing did not go down with this episode.  The ear is sore, she denies any otalgia, otorrhea, bloody otorrhea.  Patient is not had prior ear surgery.    Past Medical History  There is no problem list on file for this patient.    Current Medications    Current Outpatient Medications:      clotrimazole (LOTRIMIN) 1 % external solution, Place 3 drops in right ear twice daily for 10 days, Disp: 10 mL, Rfl: 0     levonorgestrel-ethinyl estradiol (NORDETTE) 0.15-30 MG-MCG tablet, , Disp: , Rfl:      prednisoLONE acetate (PRED FORTE) 1 % ophthalmic suspension, Place 3 drops into the right ear 2 times daily for 10 days, Disp: 10 mL, Rfl: 0     traZODone (DESYREL) 50 MG tablet, Take 1 tablet (50 mg) by mouth At Bedtime, Disp: 30 tablet, Rfl: 1     venlafaxine (EFFEXOR-XR) 37.5 MG 24 hr capsule, Take 1 capsule (37.5 mg) by mouth daily, Disp: 30 capsule, Rfl: 1    Allergies  Allergies   Allergen Reactions     Dairy Products [Milk Protein Extract] Other (See Comments)     Headaches, stomach aches       Peanuts [Nuts] Other (See Comments)     Headaches, stomach aches       Wheat Gluten [Gluten Meal] Other (See Comments)     Headaches, stomach aches         Social History  Social History     Socioeconomic History     Marital status: Single     Spouse name: None     Number of children: None     Years of education: None     Highest education level:  None   Occupational History     None   Tobacco Use     Smoking status: Never Smoker     Smokeless tobacco: Never Used   Substance and Sexual Activity     Alcohol use: Yes     Comment: Maybe 2-3 drinks a week.     Drug use: Never     Sexual activity: Not Currently     Partners: Male     Birth control/protection: Pill   Other Topics Concern     Parent/sibling w/ CABG, MI or angioplasty before 65F 55M? No   Social History Narrative     None     Social Determinants of Health     Financial Resource Strain:      Difficulty of Paying Living Expenses:    Food Insecurity:      Worried About Running Out of Food in the Last Year:      Ran Out of Food in the Last Year:    Transportation Needs:      Lack of Transportation (Medical):      Lack of Transportation (Non-Medical):    Physical Activity:      Days of Exercise per Week:      Minutes of Exercise per Session:    Stress:      Feeling of Stress :    Social Connections:      Frequency of Communication with Friends and Family:      Frequency of Social Gatherings with Friends and Family:      Attends Mandaeism Services:      Active Member of Clubs or Organizations:      Attends Club or Organization Meetings:      Marital Status:    Intimate Partner Violence:      Fear of Current or Ex-Partner:      Emotionally Abused:      Physically Abused:      Sexually Abused:        Family History  Family History   Problem Relation Age of Onset     Hypertension Father      Heart Disease Maternal Grandfather      Cancer Paternal Grandfather      Heart Disease Paternal Grandfather      Hypertension Paternal Grandfather      Hyperlipidemia Paternal Grandfather        Review of Systems  As per HPI and PMHx, otherwise 10 system review including the head and neck, constitutional, eyes, respiratory, GI, skin, neurologic, lymphatic, endocrine, and allergy systems is negative.    Physical Exam  BP (!) 138/93 (BP Location: Right arm, Patient Position: Sitting, Cuff Size: Adult Large)   Pulse 66    "Temp 98.5  F (36.9  C) (Tympanic)   Ht 1.702 m (5' 7\")   Wt 114.3 kg (252 lb)   BMI 39.47 kg/m    GENERAL: Patient is a pleasant, cooperative 26 year old female in no acute distress.  HEAD: Normocephalic, atraumatic.  Hair and scalp are normal.  EYES: Pupils are equal, round, reactive to light and accommodation.  Extraocular movements are intact.  The sclera nonicteric without injection.  The extraocular structures are normal.  EARS: See below.   NEUROLOGIC: Cranial nerves II through XII are grossly intact.  Voice is strong.  Facial nerve examination incomplete due to the patient wearing a mask.  CARDIOVASCULAR: Extremities are warm and well-perfused.  No significant peripheral edema.  RESPIRATORY: Patient has nonlabored breathing without cough, wheeze, stridor.  PSYCHIATRIC: Patient is alert and oriented.  Mood and affect appear normal.  SKIN: Warm and dry.  No scalp, face, or neck lesions noted.    Physical Exam and Procedure     EARS: Normal shape and symmetry.  No tenderness when palpating the mastoid or tragal areas bilaterally.  The ears were then examined under the otic binocular microscope to perform cerumen removal.  Otoscopic exam on the right reveals impacted white ceruminous debris and fungal elements down to the level of the tympanic membrane into the anterior sulcus.  The cerumen was removed with a #3 #5 suction.  The right tympanic membrane was round, intact without evidence of effusion.  No retraction, granulation, drainage, or evidence of cholesteatoma.       Attention was turned to the left ear.  Otoscopic exam on the left reveals a moderate amount of cerumen cerumen down to the level of the tympanic membrane.  The cerumen was removed with a curette, #5 suction, and alligator forceps.  The left tympanic membrane was round, intact without evidence of effusion.  No retraction, granulation, drainage, or evidence of cholesteatoma.               Assessment and Plan     ICD-10-CM    1. Otitis externa, " fungal, right ear  B36.9 prednisoLONE acetate (PRED FORTE) 1 % ophthalmic suspension    H62.41 clotrimazole (LOTRIMIN) 1 % external solution   2. Otalgia, right  H92.01 prednisoLONE acetate (PRED FORTE) 1 % ophthalmic suspension     clotrimazole (LOTRIMIN) 1 % external solution   3. Impacted cerumen of right ear  H61.21 Remove Cerumen      It was my pleasure seeing Blanche Lyman today in clinic. The patient has a right fungal otitis externa.  I debrided the canal under the microscope.  I will have her start Lotrimin drops and prednisolone drops to the right ear.  We will use 3 drops of each twice daily for 10 days.  The discussed otherwise keeping the ear dry, and to not place anything instruments in the ear.   I would like to see her back in 3 to 4 weeks for recheck.  The patient knows to contact me sooner with any problems or concerns.     Blanche to follow up with Primary Care provider regarding elevated blood pressure.    Aniceto Ca MD  Department of Otolarygology-Head and Neck Surgery  [facility]

## 2021-08-18 NOTE — LETTER
8/18/2021         RE: Blanche Lyman  [address] [address]        Dear Colleague,    Thank you for referring your patient, Blanche Lyman, to the [facility]. Please see a copy of my visit note below.    Chief Complaint   Patient presents with     Ear Problem     follow up right ear otagia/still having issues/possible tube      History of Present Illness  Blanche Lyman is a 26 year old female who presents today for follow-up.  The patient was last seen on 8/2/2021 for follow-up.  He was recently diagnosed with otitis media/externa.  She was placed on antibiotic eardrops at that time.  When I saw the patient, she did not have any signs of active otitis media or externa.  She contacted our office today as she has had a several day history of right ear discomfort.  She feels like the hearing did not go down with this episode.  The ear is sore, she denies any otalgia, otorrhea, bloody otorrhea.  Patient is not had prior ear surgery.    Past Medical History  There is no problem list on file for this patient.    Current Medications    Current Outpatient Medications:      clotrimazole (LOTRIMIN) 1 % external solution, Place 3 drops in right ear twice daily for 10 days, Disp: 10 mL, Rfl: 0     levonorgestrel-ethinyl estradiol (NORDETTE) 0.15-30 MG-MCG tablet, , Disp: , Rfl:      prednisoLONE acetate (PRED FORTE) 1 % ophthalmic suspension, Place 3 drops into the right ear 2 times daily for 10 days, Disp: 10 mL, Rfl: 0     traZODone (DESYREL) 50 MG tablet, Take 1 tablet (50 mg) by mouth At Bedtime, Disp: 30 tablet, Rfl: 1     venlafaxine (EFFEXOR-XR) 37.5 MG 24 hr capsule, Take 1 capsule (37.5 mg) by mouth daily, Disp: 30 capsule, Rfl: 1    Allergies  Allergies   Allergen Reactions     Dairy Products [Milk Protein Extract] Other (See Comments)     Headaches, stomach aches       Peanuts [Nuts] Other (See Comments)     Headaches, stomach aches       Wheat Gluten [Gluten Meal]  Other (See Comments)     Headaches, stomach aches         Social History  Social History     Socioeconomic History     Marital status: Single     Spouse name: None     Number of children: None     Years of education: None     Highest education level: None   Occupational History     None   Tobacco Use     Smoking status: Never Smoker     Smokeless tobacco: Never Used   Substance and Sexual Activity     Alcohol use: Yes     Comment: Maybe 2-3 drinks a week.     Drug use: Never     Sexual activity: Not Currently     Partners: Male     Birth control/protection: Pill   Other Topics Concern     Parent/sibling w/ CABG, MI or angioplasty before 65F 55M? No   Social History Narrative     None     Social Determinants of Health     Financial Resource Strain:      Difficulty of Paying Living Expenses:    Food Insecurity:      Worried About Running Out of Food in the Last Year:      Ran Out of Food in the Last Year:    Transportation Needs:      Lack of Transportation (Medical):      Lack of Transportation (Non-Medical):    Physical Activity:      Days of Exercise per Week:      Minutes of Exercise per Session:    Stress:      Feeling of Stress :    Social Connections:      Frequency of Communication with Friends and Family:      Frequency of Social Gatherings with Friends and Family:      Attends Christianity Services:      Active Member of Clubs or Organizations:      Attends Club or Organization Meetings:      Marital Status:    Intimate Partner Violence:      Fear of Current or Ex-Partner:      Emotionally Abused:      Physically Abused:      Sexually Abused:        Family History  Family History   Problem Relation Age of Onset     Hypertension Father      Heart Disease Maternal Grandfather      Cancer Paternal Grandfather      Heart Disease Paternal Grandfather      Hypertension Paternal Grandfather      Hyperlipidemia Paternal Grandfather        Review of Systems  As per HPI and PMHx, otherwise 10 system review including the  "head and neck, constitutional, eyes, respiratory, GI, skin, neurologic, lymphatic, endocrine, and allergy systems is negative.    Physical Exam  BP (!) 138/93 (BP Location: Right arm, Patient Position: Sitting, Cuff Size: Adult Large)   Pulse 66   Temp 98.5  F (36.9  C) (Tympanic)   Ht 1.702 m (5' 7\")   Wt 114.3 kg (252 lb)   BMI 39.47 kg/m    GENERAL: Patient is a pleasant, cooperative 26 year old female in no acute distress.  HEAD: Normocephalic, atraumatic.  Hair and scalp are normal.  EYES: Pupils are equal, round, reactive to light and accommodation.  Extraocular movements are intact.  The sclera nonicteric without injection.  The extraocular structures are normal.  EARS: See below.   NEUROLOGIC: Cranial nerves II through XII are grossly intact.  Voice is strong.  Facial nerve examination incomplete due to the patient wearing a mask.  CARDIOVASCULAR: Extremities are warm and well-perfused.  No significant peripheral edema.  RESPIRATORY: Patient has nonlabored breathing without cough, wheeze, stridor.  PSYCHIATRIC: Patient is alert and oriented.  Mood and affect appear normal.  SKIN: Warm and dry.  No scalp, face, or neck lesions noted.    Physical Exam and Procedure     EARS: Normal shape and symmetry.  No tenderness when palpating the mastoid or tragal areas bilaterally.  The ears were then examined under the otic binocular microscope to perform cerumen removal.  Otoscopic exam on the right reveals impacted white ceruminous debris and fungal elements down to the level of the tympanic membrane into the anterior sulcus.  The cerumen was removed with a #3 #5 suction.  The right tympanic membrane was round, intact without evidence of effusion.  No retraction, granulation, drainage, or evidence of cholesteatoma.       Attention was turned to the left ear.  Otoscopic exam on the left reveals a moderate amount of cerumen cerumen down to the level of the tympanic membrane.  The cerumen was removed with a curette, " #5 suction, and alligator forceps.  The left tympanic membrane was round, intact without evidence of effusion.  No retraction, granulation, drainage, or evidence of cholesteatoma.               Assessment and Plan     ICD-10-CM    1. Otitis externa, fungal, right ear  B36.9 prednisoLONE acetate (PRED FORTE) 1 % ophthalmic suspension    H62.41 clotrimazole (LOTRIMIN) 1 % external solution   2. Otalgia, right  H92.01 prednisoLONE acetate (PRED FORTE) 1 % ophthalmic suspension     clotrimazole (LOTRIMIN) 1 % external solution   3. Impacted cerumen of right ear  H61.21 Remove Cerumen      It was my pleasure seeing Blanche Lyman today in clinic. The patient has a right fungal otitis externa.  I debrided the canal under the microscope.  I will have her start Lotrimin drops and prednisolone drops to the right ear.  We will use 3 drops of each twice daily for 10 days.  The discussed otherwise keeping the ear dry, and to not place anything instruments in the ear.   I would like to see her back in 3 to 4 weeks for recheck.  The patient knows to contact me sooner with any problems or concerns.     Aniceto Ca MD  Department of Otolarygology-Head and Neck Surgery  [facility]         Again, thank you for allowing me to participate in the care of your patient.        Sincerely,        Aniceto Ca MD

## 2021-08-27 ENCOUNTER — MYC MEDICAL ADVICE (OUTPATIENT)
Dept: FAMILY MEDICINE | Facility: CLINIC | Age: 26
End: 2021-08-27

## 2021-08-28 ENCOUNTER — LAB (OUTPATIENT)
Dept: LAB | Facility: CLINIC | Age: 26
End: 2021-08-28
Payer: COMMERCIAL

## 2021-08-28 DIAGNOSIS — F33.0 MAJOR DEPRESSIVE DISORDER, RECURRENT EPISODE, MILD (H): ICD-10-CM

## 2021-08-28 LAB
ALBUMIN SERPL-MCNC: 3.8 G/DL (ref 3.4–5)
ALP SERPL-CCNC: 66 U/L (ref 40–150)
ALT SERPL W P-5'-P-CCNC: 25 U/L (ref 0–50)
ANION GAP SERPL CALCULATED.3IONS-SCNC: 1 MMOL/L (ref 3–14)
AST SERPL W P-5'-P-CCNC: 17 U/L (ref 0–45)
BASOPHILS # BLD AUTO: 0 10E3/UL (ref 0–0.2)
BASOPHILS NFR BLD AUTO: 0 %
BILIRUB SERPL-MCNC: 0.2 MG/DL (ref 0.2–1.3)
BUN SERPL-MCNC: 12 MG/DL (ref 7–30)
CALCIUM SERPL-MCNC: 8.5 MG/DL (ref 8.5–10.1)
CHLORIDE BLD-SCNC: 110 MMOL/L (ref 94–109)
CO2 SERPL-SCNC: 27 MMOL/L (ref 20–32)
CREAT SERPL-MCNC: 0.87 MG/DL (ref 0.52–1.04)
EOSINOPHIL # BLD AUTO: 0.1 10E3/UL (ref 0–0.7)
EOSINOPHIL NFR BLD AUTO: 2 %
ERYTHROCYTE [DISTWIDTH] IN BLOOD BY AUTOMATED COUNT: 12.8 % (ref 10–15)
GFR SERPL CREATININE-BSD FRML MDRD: >90 ML/MIN/1.73M2
GLUCOSE BLD-MCNC: 82 MG/DL (ref 70–99)
HCT VFR BLD AUTO: 43.1 % (ref 35–47)
HGB BLD-MCNC: 14.8 G/DL (ref 11.7–15.7)
LYMPHOCYTES # BLD AUTO: 2.2 10E3/UL (ref 0.8–5.3)
LYMPHOCYTES NFR BLD AUTO: 32 %
MCH RBC QN AUTO: 31.4 PG (ref 26.5–33)
MCHC RBC AUTO-ENTMCNC: 34.3 G/DL (ref 31.5–36.5)
MCV RBC AUTO: 92 FL (ref 78–100)
MONOCYTES # BLD AUTO: 0.7 10E3/UL (ref 0–1.3)
MONOCYTES NFR BLD AUTO: 11 %
NEUTROPHILS # BLD AUTO: 3.8 10E3/UL (ref 1.6–8.3)
NEUTROPHILS NFR BLD AUTO: 55 %
PLATELET # BLD AUTO: 364 10E3/UL (ref 150–450)
POTASSIUM BLD-SCNC: 4.9 MMOL/L (ref 3.4–5.3)
PROT SERPL-MCNC: 7.8 G/DL (ref 6.8–8.8)
RBC # BLD AUTO: 4.71 10E6/UL (ref 3.8–5.2)
SODIUM SERPL-SCNC: 138 MMOL/L (ref 133–144)
TSH SERPL DL<=0.005 MIU/L-ACNC: 1.01 MU/L (ref 0.4–4)
WBC # BLD AUTO: 6.8 10E3/UL (ref 4–11)

## 2021-08-28 PROCEDURE — 36415 COLL VENOUS BLD VENIPUNCTURE: CPT

## 2021-08-28 PROCEDURE — 82306 VITAMIN D 25 HYDROXY: CPT

## 2021-08-28 PROCEDURE — 80050 GENERAL HEALTH PANEL: CPT

## 2021-08-30 LAB — DEPRECATED CALCIDIOL+CALCIFEROL SERPL-MC: 33 UG/L (ref 20–75)

## 2021-09-03 NOTE — PROGRESS NOTES
Chief Complaint   Patient presents with     Cerumen Impaction     History of Present Illness  Blanche Lyman is a 26 year old female who presents today for follow-up. The patient was last seen on 8/18/2021 for follow-up.  She has a history of otitis externa.  When I saw her in office, she had evidence of left fungal otitis externa.  Her ear was debrided and we started her on Lotrimin and prednisolone eardrops.  Returns today for follow-up examination.    Since last seeing the patient, the patient reports significant improvement in the right ear.  Her ear is still a bit plugged and itchy.  The left ear is been itching a slight bit.  Not heard from the compounding pharmacy had about the powder.  She denies any otalgia, otorrhea, bloody otorrhea.  Patient is not had prior ear surgery.    Past Medical History  There is no problem list on file for this patient.    Current Medications    Current Outpatient Medications:      traZODone (DESYREL) 50 MG tablet, Take 1 tablet (50 mg) by mouth At Bedtime, Disp: 30 tablet, Rfl: 1     venlafaxine (EFFEXOR-XR) 75 MG 24 hr capsule, Take 1 capsule (75 mg) by mouth daily, Disp: 90 capsule, Rfl: 1     clotrimazole (LOTRIMIN) 1 % external solution, Place 3 drops in right ear twice daily for 10 days (Patient not taking: Reported on 9/8/2021), Disp: 10 mL, Rfl: 0     COMPOUNDED NON-CONTROLLED SUBSTANCE (CMPD RX) - PHARMACY TO MIX COMPOUNDED MEDICATION, Chloramphenicol 50 mg, sulfamethoxazole 50 mg, amphotericin B 5 mg, hydrocortisone 1 mg. 2-3 puffs to affected ear PRN itching/drainage (Patient not taking: Reported on 9/10/2021), Disp: 30 capsule, Rfl: 1    Allergies  Allergies   Allergen Reactions     Dairy Products [Milk Protein Extract] Other (See Comments)     Headaches, stomach aches       Peanuts [Nuts] Other (See Comments)     Headaches, stomach aches       Wheat Gluten [Gluten Meal] Other (See Comments)     Headaches, stomach aches         Social History  Social History      Socioeconomic History     Marital status: Single     Spouse name: Not on file     Number of children: Not on file     Years of education: Not on file     Highest education level: Not on file   Occupational History     Not on file   Tobacco Use     Smoking status: Never Smoker     Smokeless tobacco: Never Used   Substance and Sexual Activity     Alcohol use: Yes     Comment: Maybe 2-3 drinks a week.     Drug use: Never     Sexual activity: Not Currently     Partners: Male     Birth control/protection: Pill   Other Topics Concern     Parent/sibling w/ CABG, MI or angioplasty before 65F 55M? No   Social History Narrative     Not on file     Social Determinants of Health     Financial Resource Strain:      Difficulty of Paying Living Expenses:    Food Insecurity:      Worried About Running Out of Food in the Last Year:      Ran Out of Food in the Last Year:    Transportation Needs:      Lack of Transportation (Medical):      Lack of Transportation (Non-Medical):    Physical Activity:      Days of Exercise per Week:      Minutes of Exercise per Session:    Stress:      Feeling of Stress :    Social Connections:      Frequency of Communication with Friends and Family:      Frequency of Social Gatherings with Friends and Family:      Attends Rastafarian Services:      Active Member of Clubs or Organizations:      Attends Club or Organization Meetings:      Marital Status:    Intimate Partner Violence:      Fear of Current or Ex-Partner:      Emotionally Abused:      Physically Abused:      Sexually Abused:        Family History  Family History   Problem Relation Age of Onset     Hypertension Father      Heart Disease Maternal Grandfather      Cancer Paternal Grandfather      Heart Disease Paternal Grandfather      Hypertension Paternal Grandfather      Hyperlipidemia Paternal Grandfather        Review of Systems  As per HPI and PMHx, otherwise 10 system review including the head and neck, constitutional, eyes,  "respiratory, GI, skin, neurologic, lymphatic, endocrine, and allergy systems is negative.    Physical Exam  BP (!) 149/83 (BP Location: Right arm, Patient Position: Sitting, Cuff Size: Adult Large)   Pulse 72   Temp 97.6  F (36.4  C) (Tympanic)   Ht 1.702 m (5' 7\")   Wt 114.3 kg (252 lb)   BMI 39.47 kg/m    GENERAL: Patient is a pleasant, cooperative 26 year old female in no acute distress.  HEAD: Normocephalic, atraumatic.  Hair and scalp are normal.  EYES: Pupils are equal, round, reactive to light and accommodation.  Extraocular movements are intact.  The sclera nonicteric without injection.  The extraocular structures are normal.  EARS: See below.   NEUROLOGIC: Cranial nerves II through XII are grossly intact.  Voice is strong.  Facial nerve examination incomplete due to the patient wearing a mask.  CARDIOVASCULAR: Extremities are warm and well-perfused.  No significant peripheral edema.  RESPIRATORY: Patient has nonlabored breathing without cough, wheeze, stridor.  PSYCHIATRIC: Patient is alert and oriented.  Mood and affect appear normal.  SKIN: Warm and dry.  No scalp, face, or neck lesions noted.     Physical Exam and Procedure     EARS: Normal shape and symmetry.  No tenderness when palpating the mastoid or tragal areas bilaterally.  The ears were then examined under the otic binocular microscope to perform cerumen removal.  Otoscopic exam on the right reveals dry impacted ceruminous debris with powder residue without fungal elements down to the level of the tympanic membrane into the anterior sulcus.  The cerumen was removed with a #5 suction.  The right tympanic membrane was round, intact without evidence of effusion.  No retraction, granulation, drainage, or evidence of cholesteatoma.       Attention was turned to the left ear.  Otoscopic exam on the left reveals a minimal amount of cerumen.  The left tympanic membrane was round, intact without evidence of effusion.  No retraction, granulation, " drainage, or evidence of cholesteatoma.      Assessment and Plan     ICD-10-CM    1. Otitis externa, fungal, right ear  B36.9 Remove Cerumen    H62.41    2. Impacted cerumen of right ear  H61.21 Remove Cerumen   3. History of acute otitis media  Z86.69 Remove Cerumen      It was my pleasure seeing Blanche Lyman today in clinic.  She appears to had significant interval improvement in the right ear.  Her ear was again powder today in office.  Given her level of improvement, I think we will wait for her to get powder to the compounding pharmacy.  We can discuss frequency and duration of use the powder.  If the patient's symptoms worsen, she knows to contact me and we be happy to see her sooner.    Blanche to follow up with Primary Care provider regarding elevated blood pressure.    Aniceto Ca MD  Department of Otolaryngology-Head and Neck Surgery  [facility]

## 2021-09-08 ENCOUNTER — OFFICE VISIT (OUTPATIENT)
Dept: OTOLARYNGOLOGY | Facility: CLINIC | Age: 26
End: 2021-09-08
Payer: COMMERCIAL

## 2021-09-08 VITALS
BODY MASS INDEX: 39.47 KG/M2 | SYSTOLIC BLOOD PRESSURE: 139 MMHG | TEMPERATURE: 97.1 F | DIASTOLIC BLOOD PRESSURE: 93 MMHG | HEART RATE: 70 BPM | HEIGHT: 67 IN

## 2021-09-08 DIAGNOSIS — H62.41 OTITIS EXTERNA, FUNGAL, RIGHT EAR: Primary | ICD-10-CM

## 2021-09-08 DIAGNOSIS — B36.9 OTITIS EXTERNA, FUNGAL, RIGHT EAR: Primary | ICD-10-CM

## 2021-09-08 DIAGNOSIS — H61.21 IMPACTED CERUMEN OF RIGHT EAR: ICD-10-CM

## 2021-09-08 DIAGNOSIS — H92.01 OTALGIA, RIGHT: ICD-10-CM

## 2021-09-08 PROCEDURE — 99213 OFFICE O/P EST LOW 20 MIN: CPT | Mod: 25 | Performed by: OTOLARYNGOLOGY

## 2021-09-08 PROCEDURE — 69210 REMOVE IMPACTED EAR WAX UNI: CPT | Performed by: OTOLARYNGOLOGY

## 2021-09-08 NOTE — LETTER
9/8/2021         RE: Blanche Lyman  [address] [facility] [address]        Dear Colleague,    Thank you for referring your patient, Blanche Lyman, to the [facility]. Please see a copy of my visit note below.    Chief Complaint   Patient presents with     Ear Problem     follow up right ear- was bleeding yesterday     History of Present Illness  Blanche Lyman is a 26 year old female who presents today for follow-up. The patient was last seen on 8/18/2021 for follow-up.  She has a history of otitis externa.  When I saw her in office, she had evidence of left fungal otitis externa.  Her ear was debrided and we started her on Lotrimin and prednisolone eardrops.  Returns today for follow-up examination.    Since last seeing the patient, the patient reports provement after treated with eardrops but then over the last 48 hours she is had more problems with ear plugging, pain, fullness, and some bloody otorrhea on the right-hand side. Patient is not had prior ear surgery.    Past Medical History  There is no problem list on file for this patient.    Current Medications    Current Outpatient Medications:      COMPOUNDED NON-CONTROLLED SUBSTANCE (CMPD RX) - PHARMACY TO MIX COMPOUNDED MEDICATION, Chloramphenicol 50 mg, sulfamethoxazole 50 mg, amphotericin B 5 mg, hydrocortisone 1 mg. 2-3 puffs to affected ear PRN itching/drainage, Disp: 30 capsule, Rfl: 1     clotrimazole (LOTRIMIN) 1 % external solution, Place 3 drops in right ear twice daily for 10 days (Patient not taking: Reported on 9/8/2021), Disp: 10 mL, Rfl: 0     traZODone (DESYREL) 50 MG tablet, Take 1 tablet (50 mg) by mouth At Bedtime, Disp: 30 tablet, Rfl: 1     venlafaxine (EFFEXOR-XR) 37.5 MG 24 hr capsule, Take 1 capsule (37.5 mg) by mouth daily, Disp: 30 capsule, Rfl: 1    Allergies  Allergies   Allergen Reactions     Dairy Products [Milk Protein Extract] Other (See Comments)     Headaches, stomach aches       Peanuts [Nuts]  Other (See Comments)     Headaches, stomach aches       Wheat Gluten [Gluten Meal] Other (See Comments)     Headaches, stomach aches         Social History  Social History     Socioeconomic History     Marital status: Single     Spouse name: Not on file     Number of children: Not on file     Years of education: Not on file     Highest education level: Not on file   Occupational History     Not on file   Tobacco Use     Smoking status: Never Smoker     Smokeless tobacco: Never Used   Substance and Sexual Activity     Alcohol use: Yes     Comment: Maybe 2-3 drinks a week.     Drug use: Never     Sexual activity: Not Currently     Partners: Male     Birth control/protection: Pill   Other Topics Concern     Parent/sibling w/ CABG, MI or angioplasty before 65F 55M? No   Social History Narrative     Not on file     Social Determinants of Health     Financial Resource Strain:      Difficulty of Paying Living Expenses:    Food Insecurity:      Worried About Running Out of Food in the Last Year:      Ran Out of Food in the Last Year:    Transportation Needs:      Lack of Transportation (Medical):      Lack of Transportation (Non-Medical):    Physical Activity:      Days of Exercise per Week:      Minutes of Exercise per Session:    Stress:      Feeling of Stress :    Social Connections:      Frequency of Communication with Friends and Family:      Frequency of Social Gatherings with Friends and Family:      Attends Shinto Services:      Active Member of Clubs or Organizations:      Attends Club or Organization Meetings:      Marital Status:    Intimate Partner Violence:      Fear of Current or Ex-Partner:      Emotionally Abused:      Physically Abused:      Sexually Abused:        Family History  Family History   Problem Relation Age of Onset     Hypertension Father      Heart Disease Maternal Grandfather      Cancer Paternal Grandfather      Heart Disease Paternal Grandfather      Hypertension Paternal Grandfather       "Hyperlipidemia Paternal Grandfather        Review of Systems  As per HPI and PMHx, otherwise 10 system review including the head and neck, constitutional, eyes, respiratory, GI, skin, neurologic, lymphatic, endocrine, and allergy systems is negative.    Physical Exam  BP (!) 139/93 (BP Location: Right arm, Patient Position: Sitting, Cuff Size: Adult Large)   Pulse 70   Temp 97.1  F (36.2  C) (Tympanic)   Ht 1.702 m (5' 7\")   BMI 39.47 kg/m    GENERAL: Patient is a pleasant, cooperative 26 year old female in no acute distress.  HEAD: Normocephalic, atraumatic.  Hair and scalp are normal.  EYES: Pupils are equal, round, reactive to light and accommodation.  Extraocular movements are intact.  The sclera nonicteric without injection.  The extraocular structures are normal.  EARS: See below.   NEUROLOGIC: Cranial nerves II through XII are grossly intact.  Voice is strong.  Facial nerve examination incomplete due to the patient wearing a mask.  CARDIOVASCULAR: Extremities are warm and well-perfused.  No significant peripheral edema.  RESPIRATORY: Patient has nonlabored breathing without cough, wheeze, stridor.  PSYCHIATRIC: Patient is alert and oriented.  Mood and affect appear normal.  SKIN: Warm and dry.  No scalp, face, or neck lesions noted.     Physical Exam and Procedure     EARS: Normal shape and symmetry.  No tenderness when palpating the mastoid or tragal areas bilaterally.  The ears were then examined under the otic binocular microscope to perform cerumen removal.  Otoscopic exam on the right reveals impacted white ceruminous debris and fungal elements down to the level of the tympanic membrane into the anterior sulcus.  The cerumen was removed with a #3 #5 suction.  The right tympanic membrane was round, intact without evidence of effusion.  No retraction, granulation, drainage, or evidence of cholesteatoma.       Attention was turned to the left ear.  Otoscopic exam on the left reveals a moderate amount of " cerumen cerumen down to the level of the tympanic membrane.  The cerumen was removed with a curette, #5 suction, and alligator forceps.  The left tympanic membrane was round, intact without evidence of effusion.  No retraction, granulation, drainage, or evidence of cholesteatoma.      Assessment and Plan     ICD-10-CM    1. Otitis externa, fungal, right ear  B36.9 Remove Cerumen    H62.41 COMPOUNDED NON-CONTROLLED SUBSTANCE (CMPD RX) - PHARMACY TO MIX COMPOUNDED MEDICATION   2. Otalgia, right  H92.01 Remove Cerumen     COMPOUNDED NON-CONTROLLED SUBSTANCE (CMPD RX) - PHARMACY TO MIX COMPOUNDED MEDICATION   3. Impacted cerumen of right ear  H61.21 Remove Cerumen     COMPOUNDED NON-CONTROLLED SUBSTANCE (CMPD RX) - PHARMACY TO MIX COMPOUNDED MEDICATION      It was my pleasure seeing Blanche Lyman today in clinic.  Unfortunately, she appears to have recurrence of the right fungal otitis externa.  This may be polymicrobial and partly a moisture issue.  I think we have to switch from drops to powder.  I gave her an insufflator in clinic and sent a prescription to the compounding pharmacy for ear powder.  We will try to get her the powder by the end of the week.  I will have her keep her appointment for Friday so we can reexamine her ear, debride her ear, and repowder her ear on Friday.    Blanche to follow up with Primary Care provider regarding elevated blood pressure.    Aniceto Ca MD  Department of Otolaryngology-Head and Neck Surgery  [facility]         Again, thank you for allowing me to participate in the care of your patient.        Sincerely,        Aniceto Ca MD

## 2021-09-08 NOTE — PROGRESS NOTES
Chief Complaint   Patient presents with     Ear Problem     follow up right ear- was bleeding yesterday     History of Present Illness  Blanche Lyman is a 26 year old female who presents today for follow-up. The patient was last seen on 8/18/2021 for follow-up.  She has a history of otitis externa.  When I saw her in office, she had evidence of left fungal otitis externa.  Her ear was debrided and we started her on Lotrimin and prednisolone eardrops.  Returns today for follow-up examination.    Since last seeing the patient, the patient reports provement after treated with eardrops but then over the last 48 hours she is had more problems with ear plugging, pain, fullness, and some bloody otorrhea on the right-hand side. Patient is not had prior ear surgery.    Past Medical History  There is no problem list on file for this patient.    Current Medications    Current Outpatient Medications:      COMPOUNDED NON-CONTROLLED SUBSTANCE (CMPD RX) - PHARMACY TO MIX COMPOUNDED MEDICATION, Chloramphenicol 50 mg, sulfamethoxazole 50 mg, amphotericin B 5 mg, hydrocortisone 1 mg. 2-3 puffs to affected ear PRN itching/drainage, Disp: 30 capsule, Rfl: 1     clotrimazole (LOTRIMIN) 1 % external solution, Place 3 drops in right ear twice daily for 10 days (Patient not taking: Reported on 9/8/2021), Disp: 10 mL, Rfl: 0     traZODone (DESYREL) 50 MG tablet, Take 1 tablet (50 mg) by mouth At Bedtime, Disp: 30 tablet, Rfl: 1     venlafaxine (EFFEXOR-XR) 37.5 MG 24 hr capsule, Take 1 capsule (37.5 mg) by mouth daily, Disp: 30 capsule, Rfl: 1    Allergies  Allergies   Allergen Reactions     Dairy Products [Milk Protein Extract] Other (See Comments)     Headaches, stomach aches       Peanuts [Nuts] Other (See Comments)     Headaches, stomach aches       Wheat Gluten [Gluten Meal] Other (See Comments)     Headaches, stomach aches         Social History  Social History     Socioeconomic History     Marital status: Single     Spouse name:  Not on file     Number of children: Not on file     Years of education: Not on file     Highest education level: Not on file   Occupational History     Not on file   Tobacco Use     Smoking status: Never Smoker     Smokeless tobacco: Never Used   Substance and Sexual Activity     Alcohol use: Yes     Comment: Maybe 2-3 drinks a week.     Drug use: Never     Sexual activity: Not Currently     Partners: Male     Birth control/protection: Pill   Other Topics Concern     Parent/sibling w/ CABG, MI or angioplasty before 65F 55M? No   Social History Narrative     Not on file     Social Determinants of Health     Financial Resource Strain:      Difficulty of Paying Living Expenses:    Food Insecurity:      Worried About Running Out of Food in the Last Year:      Ran Out of Food in the Last Year:    Transportation Needs:      Lack of Transportation (Medical):      Lack of Transportation (Non-Medical):    Physical Activity:      Days of Exercise per Week:      Minutes of Exercise per Session:    Stress:      Feeling of Stress :    Social Connections:      Frequency of Communication with Friends and Family:      Frequency of Social Gatherings with Friends and Family:      Attends Druze Services:      Active Member of Clubs or Organizations:      Attends Club or Organization Meetings:      Marital Status:    Intimate Partner Violence:      Fear of Current or Ex-Partner:      Emotionally Abused:      Physically Abused:      Sexually Abused:        Family History  Family History   Problem Relation Age of Onset     Hypertension Father      Heart Disease Maternal Grandfather      Cancer Paternal Grandfather      Heart Disease Paternal Grandfather      Hypertension Paternal Grandfather      Hyperlipidemia Paternal Grandfather        Review of Systems  As per HPI and PMHx, otherwise 10 system review including the head and neck, constitutional, eyes, respiratory, GI, skin, neurologic, lymphatic, endocrine, and allergy systems is  "negative.    Physical Exam  BP (!) 139/93 (BP Location: Right arm, Patient Position: Sitting, Cuff Size: Adult Large)   Pulse 70   Temp 97.1  F (36.2  C) (Tympanic)   Ht 1.702 m (5' 7\")   BMI 39.47 kg/m    GENERAL: Patient is a pleasant, cooperative 26 year old female in no acute distress.  HEAD: Normocephalic, atraumatic.  Hair and scalp are normal.  EYES: Pupils are equal, round, reactive to light and accommodation.  Extraocular movements are intact.  The sclera nonicteric without injection.  The extraocular structures are normal.  EARS: See below.   NEUROLOGIC: Cranial nerves II through XII are grossly intact.  Voice is strong.  Facial nerve examination incomplete due to the patient wearing a mask.  CARDIOVASCULAR: Extremities are warm and well-perfused.  No significant peripheral edema.  RESPIRATORY: Patient has nonlabored breathing without cough, wheeze, stridor.  PSYCHIATRIC: Patient is alert and oriented.  Mood and affect appear normal.  SKIN: Warm and dry.  No scalp, face, or neck lesions noted.     Physical Exam and Procedure     EARS: Normal shape and symmetry.  No tenderness when palpating the mastoid or tragal areas bilaterally.  The ears were then examined under the otic binocular microscope to perform cerumen removal.  Otoscopic exam on the right reveals impacted white ceruminous debris and fungal elements down to the level of the tympanic membrane into the anterior sulcus.  The cerumen was removed with a #3 #5 suction.  The right tympanic membrane was round, intact without evidence of effusion.  No retraction, granulation, drainage, or evidence of cholesteatoma.       Attention was turned to the left ear.  Otoscopic exam on the left reveals a moderate amount of cerumen cerumen down to the level of the tympanic membrane.  The cerumen was removed with a curette, #5 suction, and alligator forceps.  The left tympanic membrane was round, intact without evidence of effusion.  No retraction, granulation, " drainage, or evidence of cholesteatoma.      Assessment and Plan     ICD-10-CM    1. Otitis externa, fungal, right ear  B36.9 Remove Cerumen    H62.41 COMPOUNDED NON-CONTROLLED SUBSTANCE (CMPD RX) - PHARMACY TO MIX COMPOUNDED MEDICATION   2. Otalgia, right  H92.01 Remove Cerumen     COMPOUNDED NON-CONTROLLED SUBSTANCE (CMPD RX) - PHARMACY TO MIX COMPOUNDED MEDICATION   3. Impacted cerumen of right ear  H61.21 Remove Cerumen     COMPOUNDED NON-CONTROLLED SUBSTANCE (CMPD RX) - PHARMACY TO MIX COMPOUNDED MEDICATION      It was my pleasure seeing Blanche Lyman today in clinic.  Unfortunately, she appears to have recurrence of the right fungal otitis externa.  This may be polymicrobial and partly a moisture issue.  I think we have to switch from drops to powder.  I gave her an insufflator in clinic and sent a prescription to the compounding pharmacy for ear powder.  We will try to get her the powder by the end of the week.  I will have her keep her appointment for Friday so we can reexamine her ear, debride her ear, and repowder her ear on Friday.    Blanche to follow up with Primary Care provider regarding elevated blood pressure.    Aniceto Ca MD  Department of Otolaryngology-Head and Neck Surgery  [facility]

## 2021-09-08 NOTE — NURSING NOTE
"Initial BP (!) 139/93 (BP Location: Right arm, Patient Position: Sitting, Cuff Size: Adult Large)   Pulse 70   Temp 97.1  F (36.2  C) (Tympanic)   Ht 1.702 m (5' 7\")   BMI 39.47 kg/m   Estimated body mass index is 39.47 kg/m  as calculated from the following:    Height as of this encounter: 1.702 m (5' 7\").    Weight as of 8/18/21: 114.3 kg (252 lb). .    Mar Mendes CMA    "

## 2021-09-09 ENCOUNTER — VIRTUAL VISIT (OUTPATIENT)
Dept: FAMILY MEDICINE | Facility: CLINIC | Age: 26
End: 2021-09-09
Payer: COMMERCIAL

## 2021-09-09 DIAGNOSIS — F33.0 MAJOR DEPRESSIVE DISORDER, RECURRENT EPISODE, MILD (H): Primary | ICD-10-CM

## 2021-09-09 DIAGNOSIS — F51.04 CHRONIC INSOMNIA: ICD-10-CM

## 2021-09-09 PROCEDURE — 99214 OFFICE O/P EST MOD 30 MIN: CPT | Mod: GT | Performed by: NURSE PRACTITIONER

## 2021-09-09 RX ORDER — VENLAFAXINE HYDROCHLORIDE 75 MG/1
75 CAPSULE, EXTENDED RELEASE ORAL DAILY
Qty: 90 CAPSULE | Refills: 1 | Status: SHIPPED | OUTPATIENT
Start: 2021-09-09 | End: 2022-02-14

## 2021-09-09 ASSESSMENT — ANXIETY QUESTIONNAIRES
3. WORRYING TOO MUCH ABOUT DIFFERENT THINGS: NOT AT ALL
2. NOT BEING ABLE TO STOP OR CONTROL WORRYING: NEARLY EVERY DAY
7. FEELING AFRAID AS IF SOMETHING AWFUL MIGHT HAPPEN: NOT AT ALL
5. BEING SO RESTLESS THAT IT IS HARD TO SIT STILL: SEVERAL DAYS
IF YOU CHECKED OFF ANY PROBLEMS ON THIS QUESTIONNAIRE, HOW DIFFICULT HAVE THESE PROBLEMS MADE IT FOR YOU TO DO YOUR WORK, TAKE CARE OF THINGS AT HOME, OR GET ALONG WITH OTHER PEOPLE: NOT DIFFICULT AT ALL
GAD7 TOTAL SCORE: 9
1. FEELING NERVOUS, ANXIOUS, OR ON EDGE: NEARLY EVERY DAY
6. BECOMING EASILY ANNOYED OR IRRITABLE: SEVERAL DAYS

## 2021-09-09 ASSESSMENT — PATIENT HEALTH QUESTIONNAIRE - PHQ9
SUM OF ALL RESPONSES TO PHQ QUESTIONS 1-9: 15
5. POOR APPETITE OR OVEREATING: SEVERAL DAYS

## 2021-09-09 NOTE — PROGRESS NOTES
Blanche is a 26 year old who is being evaluated via a billable video visit.      How would you like to obtain your AVS? MyChart  If the video visit is dropped, the invitation should be resent by: Text to cell phone: [phone]  Will anyone else be joining your video visit? No    Video Start Time: 450    Assessment & Plan     Major depressive disorder, recurrent episode, mild (H)  Ongoing  Increase dose   - venlafaxine (EFFEXOR-XR) 75 MG 24 hr capsule  Dispense: 90 capsule; Refill: 1    Chronic insomnia  Improved. Continue trazadone.    Call and schedule psychotherapy.   Psychiatry consultation to call to schedule. Numbers given.       Follow up 1 month.   Patient in agreement. Verbal no harm contract generated.    Depression Screening Follow Up    PHQ 9/9/2021   PHQ-9 Total Score 15   Q9: Thoughts of better off dead/self-harm past 2 weeks Several days   F/U: Thoughts of suicide or self-harm -   F/U: Self harm-plan -   F/U: Self-harm action -   F/U: Safety concerns -     Call or return to the clinic with any worsening of symptoms or no resolution. Patient/Parent verbalized understanding and is in agreement. Medication side effects reviewed.   Current Outpatient Medications   Medication Sig Dispense Refill     venlafaxine (EFFEXOR-XR) 75 MG 24 hr capsule Take 1 capsule (75 mg) by mouth daily 90 capsule 1     clotrimazole (LOTRIMIN) 1 % external solution Place 3 drops in right ear twice daily for 10 days (Patient not taking: Reported on 9/8/2021) 10 mL 0     COMPOUNDED NON-CONTROLLED SUBSTANCE (CMPD RX) - PHARMACY TO MIX COMPOUNDED MEDICATION Chloramphenicol 50 mg, sulfamethoxazole 50 mg, amphotericin B 5 mg, hydrocortisone 1 mg. 2-3 puffs to affected ear PRN itching/drainage 30 capsule 1     traZODone (DESYREL) 50 MG tablet Take 1 tablet (50 mg) by mouth At Bedtime 30 tablet 1     Chart documentation with Dragon Voice recognition Software. Although reviewed after completion, some words and grammatical errors may  remain.  As the provider for this telephone/video service, I attest that I introduced myself to the patient, provided my credentials, disclosed my location, and determined that, based on a review of the patient's chart and/or a discussion with members of the patient's treatment team, a telephone/video visit is an appropriate and effective means of providing this service. The patient and I mutually agree that this visit is appropriate for telephone/video visit as well.    MARIO Kilgore [facility]    Rajesh Mancia is a 26 year old who presents for the following health issues     HPI       Depression/ Anxiety/Insomnia Followup     1 month follow up     Trazodone works well     How are you doing with your depression since your last visit? Improved but may want increase     Are you having other symptoms that might be associated with depression? No    Have you had a significant life event?  No     Are you feeling anxious or having panic attacks?   No    Do you have any concerns with your use of alcohol or other drugs? No    Social History     Tobacco Use     Smoking status: Never Smoker     Smokeless tobacco: Never Used   Substance Use Topics     Alcohol use: Yes     Comment: Maybe 2-3 drinks a week.     Drug use: Never     PHQ 1/27/2021 4/23/2021 8/12/2021   PHQ-9 Total Score 2 5 8   Q9: Thoughts of better off dead/self-harm past 2 weeks Not at all Not at all Not at all   F/U: Thoughts of suicide or self-harm - - -   F/U: Self harm-plan - - -   F/U: Self-harm action - - -   F/U: Safety concerns - - -     MIGUEL-7 SCORE 1/27/2021 4/23/2021 8/12/2021   Total Score 0 (minimal anxiety) - -   Total Score 0 1 3     Last PHQ-9 9/9/2021   1.  Little interest or pleasure in doing things 1   2.  Feeling down, depressed, or hopeless 2   3.  Trouble falling or staying asleep, or sleeping too much 1   4.  Feeling tired or having little energy 1   5.  Poor appetite or overeating 3   6.   Feeling bad about yourself 3   7.  Trouble concentrating 2   8.  Moving slowly or restless 1   Q9: Thoughts of better off dead/self-harm past 2 weeks 1   PHQ-9 Total Score 15   Difficulty at work, home, or with people -   In the past two weeks have you had thoughts of suicide or self harm? -   Do you have concerns about your personal safety or the safety of others? -   In the past 2 weeks have you thought about a plan or had intention to harm yourself? -   In the past 2 weeks have you acted on these thoughts in any way? -     MIGUEL-7  9/9/2021   1. Feeling nervous, anxious, or on edge 3   2. Not being able to stop or control worrying 3   3. Worrying too much about different things 0   4. Trouble relaxing 1   5. Being so restless that it is hard to sit still 1   6. Becoming easily annoyed or irritable 1   7. Feeling afraid, as if something awful might happen 0   MIGUEL-7 Total Score 9   If you checked any problems, how difficult have they made it for you to do your work, take care of things at home, or get along with other people? Not difficult at all         Review of Systems   Constitutional, HEENT, cardiovascular, pulmonary, GI, , musculoskeletal, neuro, skin, endocrine and psych systems are negative, except as otherwise noted.      Objective           Vitals:  No vitals were obtained today due to virtual visit.    Physical Exam   GENERAL: Healthy, alert and no distress  EYES: Eyes grossly normal to inspection.  No discharge or erythema, or obvious scleral/conjunctival abnormalities.  RESP: No audible wheeze, cough, or visible cyanosis.  No visible retractions or increased work of breathing.    SKIN: Visible skin clear. No significant rash, abnormal pigmentation or lesions.  NEURO: Cranial nerves grossly intact.  Mentation and speech appropriate for age.  PSYCH: Mentation appears normal, affect normal/bright, judgement and insight intact, normal speech and appearance well-groomed.    Lab on 08/28/2021   Component  Date Value Ref Range Status     Sodium 08/28/2021 138  133 - 144 mmol/L Final     Potassium 08/28/2021 4.9  3.4 - 5.3 mmol/L Final     Chloride 08/28/2021 110* 94 - 109 mmol/L Final     Carbon Dioxide (CO2) 08/28/2021 27  20 - 32 mmol/L Final     Anion Gap 08/28/2021 1* 3 - 14 mmol/L Final     Urea Nitrogen 08/28/2021 12  7 - 30 mg/dL Final     Creatinine 08/28/2021 0.87  0.52 - 1.04 mg/dL Final     Calcium 08/28/2021 8.5  8.5 - 10.1 mg/dL Final     Glucose 08/28/2021 82  70 - 99 mg/dL Final     Alkaline Phosphatase 08/28/2021 66  40 - 150 U/L Final     AST 08/28/2021 17  0 - 45 U/L Final     ALT 08/28/2021 25  0 - 50 U/L Final     Protein Total 08/28/2021 7.8  6.8 - 8.8 g/dL Final     Albumin 08/28/2021 3.8  3.4 - 5.0 g/dL Final     Bilirubin Total 08/28/2021 0.2  0.2 - 1.3 mg/dL Final     GFR Estimate 08/28/2021 >90  >60 mL/min/1.73m2 Final    As of July 11, 2021, eGFR is calculated by the CKD-EPI creatinine equation, without race adjustment. eGFR can be influenced by muscle mass, exercise, and diet. The reported eGFR is an estimation only and is only applicable if the renal function is stable.     TSH 08/28/2021 1.01  0.40 - 4.00 mU/L Final     Vitamin D, Total (25-Hydroxy) 08/28/2021 33  20 - 75 ug/L Final     WBC Count 08/28/2021 6.8  4.0 - 11.0 10e3/uL Final     RBC Count 08/28/2021 4.71  3.80 - 5.20 10e6/uL Final     Hemoglobin 08/28/2021 14.8  11.7 - 15.7 g/dL Final     Hematocrit 08/28/2021 43.1  35.0 - 47.0 % Final     MCV 08/28/2021 92  78 - 100 fL Final     MCH 08/28/2021 31.4  26.5 - 33.0 pg Final     MCHC 08/28/2021 34.3  31.5 - 36.5 g/dL Final     RDW 08/28/2021 12.8  10.0 - 15.0 % Final     Platelet Count 08/28/2021 364  150 - 450 10e3/uL Final     % Neutrophils 08/28/2021 55  % Final     % Lymphocytes 08/28/2021 32  % Final     % Monocytes 08/28/2021 11  % Final     % Eosinophils 08/28/2021 2  % Final     % Basophils 08/28/2021 0  % Final     Absolute Neutrophils 08/28/2021 3.8  1.6 - 8.3 10e3/uL  Final     Absolute Lymphocytes 08/28/2021 2.2  0.8 - 5.3 10e3/uL Final     Absolute Monocytes 08/28/2021 0.7  0.0 - 1.3 10e3/uL Final     Absolute Eosinophils 08/28/2021 0.1  0.0 - 0.7 10e3/uL Final     Absolute Basophils 08/28/2021 0.0  0.0 - 0.2 10e3/uL Final               Video-Visit Details  Not able to connect with video changed to telephone.     Type of service:  Video Visit    Video End Time:503    Originating Location (pt. Location): Home    Distant Location (provider location):  [facility]     Platform used for Video Visit: Bhavana

## 2021-09-10 ENCOUNTER — OFFICE VISIT (OUTPATIENT)
Dept: OTOLARYNGOLOGY | Facility: CLINIC | Age: 26
End: 2021-09-10
Payer: COMMERCIAL

## 2021-09-10 VITALS
DIASTOLIC BLOOD PRESSURE: 83 MMHG | WEIGHT: 252 LBS | SYSTOLIC BLOOD PRESSURE: 149 MMHG | HEIGHT: 67 IN | TEMPERATURE: 97.6 F | BODY MASS INDEX: 39.55 KG/M2 | HEART RATE: 72 BPM

## 2021-09-10 DIAGNOSIS — H61.21 IMPACTED CERUMEN OF RIGHT EAR: ICD-10-CM

## 2021-09-10 DIAGNOSIS — H62.41 OTITIS EXTERNA, FUNGAL, RIGHT EAR: Primary | ICD-10-CM

## 2021-09-10 DIAGNOSIS — B36.9 OTITIS EXTERNA, FUNGAL, RIGHT EAR: Primary | ICD-10-CM

## 2021-09-10 DIAGNOSIS — Z86.69 HISTORY OF ACUTE OTITIS MEDIA: ICD-10-CM

## 2021-09-10 PROCEDURE — 69210 REMOVE IMPACTED EAR WAX UNI: CPT | Performed by: OTOLARYNGOLOGY

## 2021-09-10 PROCEDURE — 99213 OFFICE O/P EST LOW 20 MIN: CPT | Mod: 25 | Performed by: OTOLARYNGOLOGY

## 2021-09-10 ASSESSMENT — ANXIETY QUESTIONNAIRES: GAD7 TOTAL SCORE: 9

## 2021-09-10 ASSESSMENT — MIFFLIN-ST. JEOR: SCORE: 1920.69

## 2021-09-10 ASSESSMENT — PAIN SCALES - GENERAL: PAINLEVEL: MILD PAIN (3)

## 2021-09-10 NOTE — LETTER
9/10/2021         RE: Blanche Lyman  [address] [facility] [address]        Dear Colleague,    Thank you for referring your patient, Blanche Lyman, to the [facility]. Please see a copy of my visit note below.    Chief Complaint   Patient presents with     Cerumen Impaction     History of Present Illness  Blanche Lyman is a 26 year old female who presents today for follow-up. The patient was last seen on 8/18/2021 for follow-up.  She has a history of otitis externa.  When I saw her in office, she had evidence of left fungal otitis externa.  Her ear was debrided and we started her on Lotrimin and prednisolone eardrops.  Returns today for follow-up examination.    Since last seeing the patient, the patient reports significant improvement in the right ear.  Her ear is still a bit plugged and itchy.  The left ear is been itching a slight bit.  Not heard from the compounding pharmacy had about the powder.  She denies any otalgia, otorrhea, bloody otorrhea.  Patient is not had prior ear surgery.    Past Medical History  There is no problem list on file for this patient.    Current Medications    Current Outpatient Medications:      traZODone (DESYREL) 50 MG tablet, Take 1 tablet (50 mg) by mouth At Bedtime, Disp: 30 tablet, Rfl: 1     venlafaxine (EFFEXOR-XR) 75 MG 24 hr capsule, Take 1 capsule (75 mg) by mouth daily, Disp: 90 capsule, Rfl: 1     clotrimazole (LOTRIMIN) 1 % external solution, Place 3 drops in right ear twice daily for 10 days (Patient not taking: Reported on 9/8/2021), Disp: 10 mL, Rfl: 0     COMPOUNDED NON-CONTROLLED SUBSTANCE (CMPD RX) - PHARMACY TO MIX COMPOUNDED MEDICATION, Chloramphenicol 50 mg, sulfamethoxazole 50 mg, amphotericin B 5 mg, hydrocortisone 1 mg. 2-3 puffs to affected ear PRN itching/drainage (Patient not taking: Reported on 9/10/2021), Disp: 30 capsule, Rfl: 1    Allergies  Allergies   Allergen Reactions     Dairy Products [Milk Protein Extract] Other  (See Comments)     Headaches, stomach aches       Peanuts [Nuts] Other (See Comments)     Headaches, stomach aches       Wheat Gluten [Gluten Meal] Other (See Comments)     Headaches, stomach aches         Social History  Social History     Socioeconomic History     Marital status: Single     Spouse name: Not on file     Number of children: Not on file     Years of education: Not on file     Highest education level: Not on file   Occupational History     Not on file   Tobacco Use     Smoking status: Never Smoker     Smokeless tobacco: Never Used   Substance and Sexual Activity     Alcohol use: Yes     Comment: Maybe 2-3 drinks a week.     Drug use: Never     Sexual activity: Not Currently     Partners: Male     Birth control/protection: Pill   Other Topics Concern     Parent/sibling w/ CABG, MI or angioplasty before 65F 55M? No   Social History Narrative     Not on file     Social Determinants of Health     Financial Resource Strain:      Difficulty of Paying Living Expenses:    Food Insecurity:      Worried About Running Out of Food in the Last Year:      Ran Out of Food in the Last Year:    Transportation Needs:      Lack of Transportation (Medical):      Lack of Transportation (Non-Medical):    Physical Activity:      Days of Exercise per Week:      Minutes of Exercise per Session:    Stress:      Feeling of Stress :    Social Connections:      Frequency of Communication with Friends and Family:      Frequency of Social Gatherings with Friends and Family:      Attends Rastafari Services:      Active Member of Clubs or Organizations:      Attends Club or Organization Meetings:      Marital Status:    Intimate Partner Violence:      Fear of Current or Ex-Partner:      Emotionally Abused:      Physically Abused:      Sexually Abused:        Family History  Family History   Problem Relation Age of Onset     Hypertension Father      Heart Disease Maternal Grandfather      Cancer Paternal Grandfather      Heart Disease  "Paternal Grandfather      Hypertension Paternal Grandfather      Hyperlipidemia Paternal Grandfather        Review of Systems  As per HPI and PMHx, otherwise 10 system review including the head and neck, constitutional, eyes, respiratory, GI, skin, neurologic, lymphatic, endocrine, and allergy systems is negative.    Physical Exam  BP (!) 149/83 (BP Location: Right arm, Patient Position: Sitting, Cuff Size: Adult Large)   Pulse 72   Temp 97.6  F (36.4  C) (Tympanic)   Ht 1.702 m (5' 7\")   Wt 114.3 kg (252 lb)   BMI 39.47 kg/m    GENERAL: Patient is a pleasant, cooperative 26 year old female in no acute distress.  HEAD: Normocephalic, atraumatic.  Hair and scalp are normal.  EYES: Pupils are equal, round, reactive to light and accommodation.  Extraocular movements are intact.  The sclera nonicteric without injection.  The extraocular structures are normal.  EARS: See below.   NEUROLOGIC: Cranial nerves II through XII are grossly intact.  Voice is strong.  Facial nerve examination incomplete due to the patient wearing a mask.  CARDIOVASCULAR: Extremities are warm and well-perfused.  No significant peripheral edema.  RESPIRATORY: Patient has nonlabored breathing without cough, wheeze, stridor.  PSYCHIATRIC: Patient is alert and oriented.  Mood and affect appear normal.  SKIN: Warm and dry.  No scalp, face, or neck lesions noted.     Physical Exam and Procedure     EARS: Normal shape and symmetry.  No tenderness when palpating the mastoid or tragal areas bilaterally.  The ears were then examined under the otic binocular microscope to perform cerumen removal.  Otoscopic exam on the right reveals dry impacted ceruminous debris with powder residue without fungal elements down to the level of the tympanic membrane into the anterior sulcus.  The cerumen was removed with a #5 suction.  The right tympanic membrane was round, intact without evidence of effusion.  No retraction, granulation, drainage, or evidence of " cholesteatoma.       Attention was turned to the left ear.  Otoscopic exam on the left reveals a minimal amount of cerumen.  The left tympanic membrane was round, intact without evidence of effusion.  No retraction, granulation, drainage, or evidence of cholesteatoma.      Assessment and Plan     ICD-10-CM    1. Otitis externa, fungal, right ear  B36.9 Remove Cerumen    H62.41    2. Impacted cerumen of right ear  H61.21 Remove Cerumen   3. History of acute otitis media  Z86.69 Remove Cerumen      It was my pleasure seeing Blanche Lyman today in clinic.  She appears to had significant interval improvement in the right ear.  Her ear was again powder today in office.  Given her level of improvement, I think we will wait for her to get powder to the compounding pharmacy.  We can discuss frequency and duration of use the powder.  If the patient's symptoms worsen, she knows to contact me and we be happy to see her sooner.    Blanche to follow up with Primary Care provider regarding elevated blood pressure.    Aniceto Ca MD  Department of Otolaryngology-Head and Neck Surgery  [facility]         Again, thank you for allowing me to participate in the care of your patient.        Sincerely,        Aniceto Ca MD

## 2021-09-10 NOTE — NURSING NOTE
"Initial BP (!) 149/83 (BP Location: Right arm, Patient Position: Sitting, Cuff Size: Adult Large)   Pulse 72   Temp 97.6  F (36.4  C) (Tympanic)   Ht 1.702 m (5' 7\")   Wt 114.3 kg (252 lb)   BMI 39.47 kg/m   Estimated body mass index is 39.47 kg/m  as calculated from the following:    Height as of this encounter: 1.702 m (5' 7\").    Weight as of this encounter: 114.3 kg (252 lb). .    Nery Wilkes LPN on 9/10/2021 at 3:57 PM    "

## 2021-09-27 NOTE — PROGRESS NOTES
Outpatient Physical Therapy Discharge Note     Patient: Blanche Lyman  : 1995    Beginning/End Dates of Reporting Period:  21 to 21     Referring Provider: Ryan Charlton Diagnosis: Sprain of Ankle Ligaments, peroneal Tendonitis      Client Self Report: Towards end of day, really hurting. Notusing brace very much, hurts more w/ brace on..     Objective Measurements:  Objective Measure: LEFS   Details: 21  Score 53/80.  INITIALLY:  41/80    Objective Measure: AROM  Details: 21 PF L 54, DF 15; DF w/ knee bent 16. INITIALlY:  PF L 41, R 60, DF L 4, R 14; DF w/ knee bent L 13, R 19.      Objective Measure: MMT  Details: 21  5 for all movements and no pain.  INITIALLY:  L DF 4 and Pful, More painful w/ Inversion vs. Eversion and 4+/5.     Objective Measure: Palpation   Details: 21  Deltoid, but not Ligaments laterally.  INITIALLY:  Tender Deltoid, Lateral Malleoli all ligaments.      Goals:  Goal Identifier 1   Goal Description STG: Pt will be able to walk 2 blocks w/ moderate difficulty MET    Target Date 21   Date Met  21   Progress (detail required for progress note): 21  No difficulty      Goal Identifier 2   Goal Description STG: Pt will be able to do stairs normally and w/ minimal difficulty.  - MET    Target Date 21   Date Met  21   Progress (detail required for progress note): 21 Minimal difficulty.      Goal Identifier 3   Goal Description STG: Pt will be able to transition to a brace for better ability to navigate stairs and walking more normally.  MET    Target Date 21   Date Met  21   Progress (detail required for progress note): Using lighter brace as needed. Not all the time.      Goal Identifier 4   Goal Description LTG: Pt will be independent w/ HEP and self cares to manage Severe L Ankle Sprain.  MET    Target Date 21   Date Met  21   Progress (detail required for progress note):        Plan:  Discharge from therapy.    Discharge:    Reason for Discharge: Patient has met all goals.  Patient chooses to discontinue therapy.    Equipment Issued:      Discharge Plan: Patient to continue home program. Pt to follow up w/MD as appropriate.   Serena Magana, PT, [facility] (#[id number])  [facility]           [phone]  Fax          [phone]  Appt #      [phone]

## 2021-10-01 DIAGNOSIS — F51.04 CHRONIC INSOMNIA: ICD-10-CM

## 2021-10-01 RX ORDER — TRAZODONE HYDROCHLORIDE 50 MG/1
50 TABLET, FILM COATED ORAL AT BEDTIME
Qty: 30 TABLET | Refills: 1 | Status: SHIPPED | OUTPATIENT
Start: 2021-10-01 | End: 2021-10-27

## 2021-10-02 ENCOUNTER — HEALTH MAINTENANCE LETTER (OUTPATIENT)
Age: 26
End: 2021-10-02

## 2021-10-27 ENCOUNTER — MYC MEDICAL ADVICE (OUTPATIENT)
Dept: FAMILY MEDICINE | Facility: CLINIC | Age: 26
End: 2021-10-27

## 2021-10-27 DIAGNOSIS — F51.04 CHRONIC INSOMNIA: ICD-10-CM

## 2021-10-27 RX ORDER — TRAZODONE HYDROCHLORIDE 50 MG/1
50 TABLET, FILM COATED ORAL AT BEDTIME
Qty: 30 TABLET | Refills: 1 | Status: SHIPPED | OUTPATIENT
Start: 2021-10-27 | End: 2022-02-23

## 2021-12-02 ENCOUNTER — E-VISIT (OUTPATIENT)
Dept: FAMILY MEDICINE | Facility: CLINIC | Age: 26
End: 2021-12-02
Payer: COMMERCIAL

## 2021-12-02 DIAGNOSIS — F41.0 PANIC ATTACK: Primary | ICD-10-CM

## 2021-12-02 PROCEDURE — 99421 OL DIG E/M SVC 5-10 MIN: CPT | Performed by: NURSE PRACTITIONER

## 2021-12-02 RX ORDER — HYDROXYZINE HYDROCHLORIDE 25 MG/1
25 TABLET, FILM COATED ORAL 3 TIMES DAILY PRN
Qty: 30 TABLET | Refills: 0 | Status: SHIPPED | OUTPATIENT
Start: 2021-12-02 | End: 2022-03-17

## 2021-12-02 ASSESSMENT — ANXIETY QUESTIONNAIRES
7. FEELING AFRAID AS IF SOMETHING AWFUL MIGHT HAPPEN: NOT AT ALL
GAD7 TOTAL SCORE: 8
8. IF YOU CHECKED OFF ANY PROBLEMS, HOW DIFFICULT HAVE THESE MADE IT FOR YOU TO DO YOUR WORK, TAKE CARE OF THINGS AT HOME, OR GET ALONG WITH OTHER PEOPLE?: SOMEWHAT DIFFICULT
3. WORRYING TOO MUCH ABOUT DIFFERENT THINGS: SEVERAL DAYS
GAD7 TOTAL SCORE: 8
2. NOT BEING ABLE TO STOP OR CONTROL WORRYING: SEVERAL DAYS
4. TROUBLE RELAXING: MORE THAN HALF THE DAYS
1. FEELING NERVOUS, ANXIOUS, OR ON EDGE: SEVERAL DAYS
6. BECOMING EASILY ANNOYED OR IRRITABLE: MORE THAN HALF THE DAYS
7. FEELING AFRAID AS IF SOMETHING AWFUL MIGHT HAPPEN: NOT AT ALL
GAD7 TOTAL SCORE: 8
5. BEING SO RESTLESS THAT IT IS HARD TO SIT STILL: SEVERAL DAYS

## 2021-12-02 NOTE — PATIENT INSTRUCTIONS
Thank you for choosing us for your care. I have placed an order for a prescription for hydroxyzine for panic attacks and anxiety so that you can start treatment. View your full visit summary for details by clicking on the link below. Your pharmacist will able to address any questions you may have about the medication.  I also placed a formal referral for you for psychotherapy. If you are not currently working with a therapist I feel this is an important step in caring for your mental health. Especially since you are experiencing panic attacks.     If you're not feeling better within 4-6 weeks please schedule an appointment.  You can [facility] an appointment right [facility], or call [phone]  If the visit is for the same symptoms as your eVisit, we'll refund the cost of your eVisit if seen within seven days.      Depression: Tips to Help Yourself    As your healthcare providers help treat your depression, you can also help yourself. Keep in mind that your illness affects you emotionally, physically, mentally, and socially. So full recovery will take time. Take care of your body and your soul, and be patient with yourself as you get better.  Self-care    Educate yourself. Read about treatment and medicine options. If you have the energy, attend local conferences or support groups. Keep a list of useful websites and helpful books and use them as needed. This illness is not your fault. Don t blame yourself for your depression.    Manage early symptoms. If you notice symptoms returning, experience triggers, or identify other factors that may lead to a depressive episode, get help as soon as possible. Ask trusted friends and family to monitor your behavior and let you know if they see anything of concern.    Work with your provider. Find a provider you can trust. Communicate honestly with that person and share information on your treatment for depression and your reaction to medicines.    Be prepared for a  crisis. Know what to do if you experience a crisis. Keep the phone number of a crisis hotline and know the location of your community's urgent care centers and the closest emergency department.    Hold off on big decisions. Depression can cloud your judgment. So wait until you feel better before making major life decisions, such as changing jobs, moving, or getting  or .    Be patient. Recovering from depression is a process. Don t be discouraged if it takes some time to feel better.    Keep it simple. Depression saps your energy and concentration. So you won t be able to do all the things you used to do. Set small goals and do what you can.    Be with others. Don t isolate yourself--you ll only feel worse. Try to be with other people. And take part in fun activities when you can. Go to a movie, ballgame, Shinto service, or social event. Talk openly with people you can trust. And accept help when it s offered.    Take care of your body  People with depression often lose the desire to take care of themselves. That only makes their problems worse. During treatment and afterward, make a point to:    Exercise. It s a great way to take care of your body. And studies have shown that exercise helps fight depression. Aim for 30 minutes of moderate activity a day. Walking in small blocks of time (5-10 minutes) is a good way to start, but anything that gets you moving (gardening, house cleaning) counts.    Don't use drugs and alcohol. These may ease the pain in the short term. But they ll only make your problems worse in the long run.    Get relief from stress. Ask your healthcare provider for relaxation exercises and techniques to help relieve stress. Consider activities like meditation, yoga, or Emeka Chi.    Eat right. A balanced and healthy diet helps keep your body healthy.    Get adequate sleep. Aim for 8 hours per night. Too much or too little sleep can cause other physical and emotional  problems.  Lachelle last reviewed this educational content on 12/1/2019 2000-2021 [employer]. All rights reserved. This information is not intended as a substitute for professional medical care. Always follow your healthcare professional's instructions.

## 2021-12-03 ASSESSMENT — ANXIETY QUESTIONNAIRES: GAD7 TOTAL SCORE: 8

## 2021-12-18 ENCOUNTER — NURSE TRIAGE (OUTPATIENT)
Dept: NURSING | Facility: CLINIC | Age: 26
End: 2021-12-18
Payer: COMMERCIAL

## 2021-12-18 NOTE — TELEPHONE ENCOUNTER
Patient father calling to report ache, headache and nausea. homecovid test negative. Patient's father states patient temp ranging from 100.9 to 102 degrees. Patient has been vomiting as well even after trying to take medication. Patient states this is close to the worst head of her life. Patient has pain with touching chin to chest.     Per protocol patient to go to Ed now.Given home care advice per protocol and when to call back.Patient's father verbalized understanding and agrees to plan of care.    Nora Brewer RN   12/18/21 1:58 AM  [facility] Nurse Advisor  COVID 19 Nurse Triage Plan/Patient Instructions    Please be aware that novel coronavirus (COVID-19) may be circulating in the community. If you develop symptoms such as fever, cough, or SOB or if you have concerns about the presence of another infection including coronavirus (COVID-19), please contact your health care provider or visit [email or web address].     Disposition/Instructions    ED Visit recommended. Follow protocol based instructions.     Bring Your Own Device:  Please also bring your smart device(s) (smart phones, tablets, laptops) and their charging cables for your personal use and to communicate with your care team during your visit.    Thank you for taking steps to prevent the spread of this virus.  o Limit your contact with others.  o Wear a simple mask to cover your cough.  o Wash your hands well and often.    Resources    [facility]: About COVID-19: [email or web address]    CDC: What to Do If You're Sick: www.cdc.gov/coronavirus/2019-ncov/about/steps-when-sick.html    CDC: Ending Home Isolation: www.cdc.gov/coronavirus/2019-ncov/hcp/disposition-in-home-patients.html     CDC: Caring for Someone: www.cdc.gov/coronavirus/2019-ncov/if-you-are-sick/care-for-someone.html     [facility]IE: Interim Guidance for Hospital Discharge to Home: www.health.[facility].[place]/diseases/coronavirus/hcp/hospdischarge.pdf    [facility]  "[place] clinical trials (COVID-19 research studies): [email or web address]-clinical-trials     Below are the COVID-19 hotlines at the [place] Department of Health ([facility]). Interpreters are available.   o For health questions: Call 517-552-7661 or 1-496.467.1199 (7 a.m. to 7 p.m.)  o For questions about schools and childcare: Call 832-521-0519 or 1-242.357.7156 (7 a.m. to 7 p.m.)                       Reason for Disposition    [1] SEVERE headache (e.g., excruciating) AND [2] \"worst headache\" of life    Additional Information    Negative: Difficult to awaken or acting confused (e.g., disoriented, slurred speech)    Negative: [1] Numbness of the face, arm or leg on one side of the body AND [2] new onset    Negative: [1] Weakness of the face, arm or leg on one side of the body AND [2] new onset    Negative: [1] Loss of speech or garbled speech AND [2] new onset    Negative: Passed out (i.e., lost consciousness, collapsed and was not responding)    Negative: Sounds like a life-threatening emergency to the triager    Negative: Followed a head injury    Negative: Pregnant    Negative: Postpartum (from 0 to 6 weeks after delivery)    Negative: Traumatic Brain Injury (TBI) is suspected    Negative: Unable to walk, or can only walk with assistance (e.g., requires support)    Negative: Stiff neck (can't touch chin to chest)    Negative: Severe pain in one eye    Negative: [1] Other family members (or roommates) with headaches AND [2] possibility of carbon monoxide exposure    Protocols used: HEADACHE-A-AH      "

## 2021-12-21 ENCOUNTER — MYC MEDICAL ADVICE (OUTPATIENT)
Dept: FAMILY MEDICINE | Facility: CLINIC | Age: 26
End: 2021-12-21

## 2021-12-21 ENCOUNTER — E-VISIT (OUTPATIENT)
Dept: FAMILY MEDICINE | Facility: CLINIC | Age: 26
End: 2021-12-21
Payer: COMMERCIAL

## 2021-12-21 ENCOUNTER — ALLIED HEALTH/NURSE VISIT (OUTPATIENT)
Dept: FAMILY MEDICINE | Facility: CLINIC | Age: 26
End: 2021-12-21
Payer: COMMERCIAL

## 2021-12-21 DIAGNOSIS — J06.9 VIRAL URI WITH COUGH: Primary | ICD-10-CM

## 2021-12-21 DIAGNOSIS — Z20.822 EXPOSURE TO COVID-19 VIRUS: ICD-10-CM

## 2021-12-21 DIAGNOSIS — J06.9 VIRAL URI WITH COUGH: ICD-10-CM

## 2021-12-21 PROCEDURE — 99421 OL DIG E/M SVC 5-10 MIN: CPT | Performed by: NURSE PRACTITIONER

## 2021-12-21 PROCEDURE — 99207 PR NO CHARGE LOS: CPT

## 2021-12-21 PROCEDURE — U0003 INFECTIOUS AGENT DETECTION BY NUCLEIC ACID (DNA OR RNA); SEVERE ACUTE RESPIRATORY SYNDROME CORONAVIRUS 2 (SARS-COV-2) (CORONAVIRUS DISEASE [COVID-19]), AMPLIFIED PROBE TECHNIQUE, MAKING USE OF HIGH THROUGHPUT TECHNOLOGIES AS DESCRIBED BY CMS-2020-01-R: HCPCS

## 2021-12-21 PROCEDURE — U0005 INFEC AGEN DETEC AMPLI PROBE: HCPCS

## 2021-12-21 RX ORDER — BENZONATATE 100 MG/1
100 CAPSULE ORAL 3 TIMES DAILY PRN
Qty: 30 CAPSULE | Refills: 0 | Status: SHIPPED | OUTPATIENT
Start: 2021-12-21 | End: 2022-02-23

## 2021-12-21 NOTE — PATIENT INSTRUCTIONS
Dear Blanche Lyman    I reviewed your recent ED visit.   I would recommend you have testing for COVID as well.   Did you get treated for Influenza with Tamiflu ?  I did send over tessalon pearls for the cough.   I did put in orders for COVID testing as well.   Let me know how we can be of further assistance.   Happy to do a virtual visit to discuss as well.     Thanks for choosing us as your health care partner,  Take Care,  MARIO Kilgore CNP

## 2021-12-22 LAB — SARS-COV-2 RNA RESP QL NAA+PROBE: NEGATIVE

## 2022-01-19 ENCOUNTER — TELEPHONE (OUTPATIENT)
Dept: FAMILY MEDICINE | Facility: CLINIC | Age: 27
End: 2022-01-19
Payer: COMMERCIAL

## 2022-01-19 ASSESSMENT — PATIENT HEALTH QUESTIONNAIRE - PHQ9: SUM OF ALL RESPONSES TO PHQ QUESTIONS 1-9: 9

## 2022-01-19 NOTE — TELEPHONE ENCOUNTER
Patient Quality Outreach    Patient is due for the following:   Depression  -  PHQ-9 Needed    NEXT STEPS:   No follow up needed at this time.    Type of outreach:    Phone, left message for patient/parent to call back.      Questions for provider review:    None     Myriam Pacheco, CMA

## 2022-01-24 ENCOUNTER — TELEPHONE (OUTPATIENT)
Dept: FAMILY MEDICINE | Facility: CLINIC | Age: 27
End: 2022-01-24
Payer: COMMERCIAL

## 2022-01-24 NOTE — TELEPHONE ENCOUNTER
Patient Quality Outreach    Patient is due for the following:   Cervical Cancer Screening - PAP Needed  Depression  -  PHQ-9 Needed    NEXT STEPS:   Schedule a yearly physical    Type of outreach:    Sent [facility] message.      Questions for provider review:    None     Bailee Kahler

## 2022-02-12 DIAGNOSIS — F51.04 CHRONIC INSOMNIA: ICD-10-CM

## 2022-02-12 DIAGNOSIS — F33.0 MAJOR DEPRESSIVE DISORDER, RECURRENT EPISODE, MILD (H): ICD-10-CM

## 2022-02-14 RX ORDER — VENLAFAXINE HYDROCHLORIDE 75 MG/1
75 CAPSULE, EXTENDED RELEASE ORAL DAILY
Qty: 90 CAPSULE | Refills: 1 | Status: SHIPPED | OUTPATIENT
Start: 2022-02-14 | End: 2022-03-17

## 2022-02-14 NOTE — TELEPHONE ENCOUNTER
Routing refill request to provider for review/approval because:  B/P not at goal:    09/10/21 (!) 149/83   09/08/21 (!) 139/93   08/18/21 (!) 138/93      Routing to Dr. Dueñas as Lilliana Lubin is signed out today.    MARIAMA Clark

## 2022-02-23 ENCOUNTER — OFFICE VISIT (OUTPATIENT)
Dept: FAMILY MEDICINE | Facility: CLINIC | Age: 27
End: 2022-02-23
Payer: COMMERCIAL

## 2022-02-23 DIAGNOSIS — Z12.4 CERVICAL CANCER SCREENING: ICD-10-CM

## 2022-02-23 DIAGNOSIS — F51.04 CHRONIC INSOMNIA: ICD-10-CM

## 2022-02-23 DIAGNOSIS — Z11.59 NEED FOR HEPATITIS C SCREENING TEST: ICD-10-CM

## 2022-02-23 DIAGNOSIS — Z00.01 ENCOUNTER FOR ROUTINE ADULT PHYSICAL EXAM WITH ABNORMAL FINDINGS: Primary | ICD-10-CM

## 2022-02-23 DIAGNOSIS — Z30.41 SURVEILLANCE FOR BIRTH CONTROL, ORAL CONTRACEPTIVES: ICD-10-CM

## 2022-02-23 DIAGNOSIS — F33.41 RECURRENT MAJOR DEPRESSIVE DISORDER, IN PARTIAL REMISSION (H): ICD-10-CM

## 2022-02-23 DIAGNOSIS — Z11.4 SCREENING FOR HIV (HUMAN IMMUNODEFICIENCY VIRUS): ICD-10-CM

## 2022-02-23 DIAGNOSIS — Z23 NEED FOR VACCINATION: ICD-10-CM

## 2022-02-23 DIAGNOSIS — E66.01 MORBID OBESITY (H): ICD-10-CM

## 2022-02-23 PROBLEM — F33.9 MAJOR DEPRESSION, RECURRENT (H): Status: ACTIVE | Noted: 2022-02-23

## 2022-02-23 PROCEDURE — 90471 IMMUNIZATION ADMIN: CPT | Performed by: NURSE PRACTITIONER

## 2022-02-23 PROCEDURE — 99213 OFFICE O/P EST LOW 20 MIN: CPT | Mod: 25 | Performed by: NURSE PRACTITIONER

## 2022-02-23 PROCEDURE — 99395 PREV VISIT EST AGE 18-39: CPT | Mod: 25 | Performed by: NURSE PRACTITIONER

## 2022-02-23 PROCEDURE — 90651 9VHPV VACCINE 2/3 DOSE IM: CPT | Performed by: NURSE PRACTITIONER

## 2022-02-23 PROCEDURE — G0145 SCR C/V CYTO,THINLAYER,RESCR: HCPCS | Performed by: NURSE PRACTITIONER

## 2022-02-23 RX ORDER — TRAZODONE HYDROCHLORIDE 50 MG/1
50 TABLET, FILM COATED ORAL AT BEDTIME
Qty: 30 TABLET | Refills: 5 | Status: SHIPPED | OUTPATIENT
Start: 2022-02-23 | End: 2022-03-07

## 2022-02-23 RX ORDER — DESOGESTREL AND ETHINYL ESTRADIOL 0.15-0.03
1 KIT ORAL DAILY
Qty: 84 TABLET | Refills: 3 | Status: SHIPPED | OUTPATIENT
Start: 2022-02-23 | End: 2022-11-01

## 2022-02-23 ASSESSMENT — ENCOUNTER SYMPTOMS
HEMATOCHEZIA: 0
FEVER: 0
HEADACHES: 1
HEMATURIA: 0
NAUSEA: 0
MYALGIAS: 0
FREQUENCY: 0
SHORTNESS OF BREATH: 0
DYSURIA: 0
ABDOMINAL PAIN: 0
SORE THROAT: 0
DIZZINESS: 0
COUGH: 0
PALPITATIONS: 0
EYE PAIN: 0
NERVOUS/ANXIOUS: 1
HEARTBURN: 0
JOINT SWELLING: 0
DIARRHEA: 0
BREAST MASS: 0
CHILLS: 0
PARESTHESIAS: 0
CONSTIPATION: 0
WEAKNESS: 0
ARTHRALGIAS: 0

## 2022-02-23 ASSESSMENT — PAIN SCALES - GENERAL: PAINLEVEL: NO PAIN (0)

## 2022-02-23 ASSESSMENT — PATIENT HEALTH QUESTIONNAIRE - PHQ9
10. IF YOU CHECKED OFF ANY PROBLEMS, HOW DIFFICULT HAVE THESE PROBLEMS MADE IT FOR YOU TO DO YOUR WORK, TAKE CARE OF THINGS AT HOME, OR GET ALONG WITH OTHER PEOPLE: SOMEWHAT DIFFICULT
SUM OF ALL RESPONSES TO PHQ QUESTIONS 1-9: 9
SUM OF ALL RESPONSES TO PHQ QUESTIONS 1-9: 9

## 2022-02-23 NOTE — PROGRESS NOTES
SUBJECTIVE:   CC: Blanche Lyman is an 26 year old woman who presents for preventive health visit.       Patient has been advised of split billing requirements and indicates understanding: Yes  Healthy Habits:     Getting at least 3 servings of Calcium per day:  Yes    Bi-annual eye exam:  NO    Dental care twice a year:  NO    Sleep apnea or symptoms of sleep apnea:  Daytime drowsiness    Diet:  Gluten-free/reduced    Frequency of exercise:  None    Taking medications regularly:  Yes    Medication side effects:  None    PHQ-2 Total Score: 3    Additional concerns today:  No        Today's PHQ-2 Score:   PHQ-2 ( 1999 [employer]) 2/23/2022   Q1: Little interest or pleasure in doing things 2   Q2: Feeling down, depressed or hopeless 1   PHQ-2 Score 3   Q1: Little interest or pleasure in doing things More than half the days   Q2: Feeling down, depressed or hopeless Several days   PHQ-2 Score 3       Abuse: Current or Past (Physical, Sexual or Emotional) - No  Do you feel safe in your environment? Yes    Have you ever done Advance Care Planning? (For example, a Health Directive, POLST, or a discussion with a medical provider or your loved ones about your wishes): Yes, patient states has an Advance Care Planning document and will bring a copy to the clinic.    Social History     Tobacco Use     Smoking status: Never Smoker     Smokeless tobacco: Never Used   Substance Use Topics     Alcohol use: Yes     Comment: Maybe 2-3 drinks a week.     If you drink alcohol do you typically have >3 drinks per day or >7 drinks per week? Not applicable    Alcohol Use 2/23/2022   Prescreen: >3 drinks/day or >7 drinks/week? No   Prescreen: >3 drinks/day or >7 drinks/week? -       Reviewed orders with patient.  Reviewed health maintenance and updated orders accordingly - Yes  Lab work is in process  Labs reviewed in EPIC  BP Readings from Last 3 Encounters:   02/23/22 (!) 130/92   09/10/21 (!) 149/83   09/08/21 (!) 139/93    Wt Readings  from Last 3 Encounters:   02/23/22 120.7 kg (266 lb 3.2 oz)   09/10/21 114.3 kg (252 lb)   08/18/21 114.3 kg (252 lb)                  Patient Active Problem List   Diagnosis     Major depression, recurrent (H)     Morbid obesity (H)     History reviewed. No pertinent surgical history.    Social History     Tobacco Use     Smoking status: Never Smoker     Smokeless tobacco: Never Used   Substance Use Topics     Alcohol use: Yes     Comment: Maybe 2-3 drinks a week.     Family History   Problem Relation Age of Onset     Hypertension Father      Heart Disease Maternal Grandfather      Cancer Paternal Grandfather      Heart Disease Paternal Grandfather      Hypertension Paternal Grandfather      Hyperlipidemia Paternal Grandfather          Current Outpatient Medications   Medication Sig Dispense Refill     desogestrel-ethinyl estradiol (APRI) 0.15-30 MG-MCG tablet Take 1 tablet by mouth daily 84 tablet 3     hydrOXYzine (ATARAX) 25 MG tablet Take 1 tablet (25 mg) by mouth 3 times daily as needed for anxiety 30 tablet 0     traZODone (DESYREL) 50 MG tablet Take 1 tablet (50 mg) by mouth At Bedtime 30 tablet 5     venlafaxine (EFFEXOR-XR) 75 MG 24 hr capsule Take 1 capsule (75 mg) by mouth daily 90 capsule 1     Allergies   Allergen Reactions     Dairy Products [Milk Protein Extract] Other (See Comments)     Headaches, stomach aches       Peanuts [Nuts] Other (See Comments)     Headaches, stomach aches       Wheat Gluten [Gluten Meal] Other (See Comments)     Headaches, stomach aches         Breast Cancer Screening:    Breast CA Risk Assessment (FHS-7) 2/23/2022   Do you have a family history of breast, colon, or ovarian cancer? No / Unknown         Patient under 40 years of age: Routine Mammogram Screening not recommended.   Pertinent mammograms are reviewed under the imaging tab.    History of abnormal Pap smear: NO - age 21-29 PAP every 3 years recommended  PAP / HPV Latest Ref Rng & Units 2/23/2022 7/6/2018   PAP    "Negative for Intraepithelial Lesion or Malignancy (NILM) -   PAP (Historical) - - NIL     Reviewed and updated as needed this visit by clinical staff   Tobacco  Allergies  Meds  Problems  Med Hx  Surg Hx  Fam Hx  Soc   Hx        Reviewed and updated as needed this visit by Provider   Tobacco  Allergies  Meds  Problems  Med Hx  Surg Hx  Fam Hx         Past Medical History:   Diagnosis Date     Depressive disorder 2013    I am on zoloft for this.      History reviewed. No pertinent surgical history.  OB History   No obstetric history on file.       Review of Systems   Constitutional: Negative for chills and fever.   HENT: Negative for congestion, ear pain, hearing loss and sore throat.    Eyes: Negative for pain and visual disturbance.   Respiratory: Negative for cough and shortness of breath.    Cardiovascular: Negative for chest pain, palpitations and peripheral edema.   Gastrointestinal: Negative for abdominal pain, constipation, diarrhea, heartburn, hematochezia and nausea.   Breasts:  Negative for tenderness, breast mass and discharge.   Genitourinary: Negative for dysuria, frequency, genital sores, hematuria, pelvic pain, urgency, vaginal bleeding and vaginal discharge.   Musculoskeletal: Negative for arthralgias, joint swelling and myalgias.   Skin: Negative for rash.   Neurological: Positive for headaches. Negative for dizziness, weakness and paresthesias.   Psychiatric/Behavioral: Positive for mood changes. The patient is nervous/anxious.         depression ongoing stable   OBJECTIVE:   BP (!) 130/92 (BP Location: Right arm, Patient Position: Chair, Cuff Size: Adult Large)   Pulse 80   Temp 98.2  F (36.8  C) (Tympanic)   Resp 18   Ht 1.721 m (5' 7.75\")   Wt 120.7 kg (266 lb 3.2 oz)   LMP 02/14/2022 (Exact Date)   Breastfeeding No   BMI 40.77 kg/m    Physical Exam  GENERAL: healthy, alert and no distress  EYES: Eyes grossly normal to inspection, PERRL and conjunctivae and sclerae " normal  HENT: ear canals and TM's normal, nose and mouth without ulcers or lesions  NECK: no adenopathy, no asymmetry, masses, or scars and thyroid normal to palpation  RESP: lungs clear to auscultation - no rales, rhonchi or wheezes  BREAST: normal without masses, tenderness or nipple discharge and no palpable axillary masses or adenopathy  CV: regular rate and rhythm, normal S1 S2, no S3 or S4, no murmur, click or rub, no peripheral edema and peripheral pulses strong  ABDOMEN: soft, nontender, no hepatosplenomegaly, no masses and bowel sounds normal   (female): normal female external genitalia, normal urethral meatus, vaginal mucosa pink, moist, well rugated, and normal cervix/adnexa/uterus without masses or discharge  MS: no gross musculoskeletal defects noted, no edema  SKIN: no suspicious lesions or rashes  NEURO: Normal strength and tone, mentation intact and speech normal  PSYCH: mentation appears normal, affect normal/bright    Diagnostic Test Results:  Labs reviewed in Epic    ASSESSMENT/PLAN:   Blanche was seen today for physical.    Diagnoses and all orders for this visit:    Encounter for routine adult physical exam with abnormal findings    Cervical cancer screening  -     Pap Screen reflex to HPV if ASCUS - recommend age 25 - 29  -     HPV Hold (Lab Only)    Surveillance for birth control, oral contraceptives  -     desogestrel-ethinyl estradiol (APRI) 0.15-30 MG-MCG tablet; Take 1 tablet by mouth daily    Recurrent major depressive disorder, in partial remission (H)    Need for vaccination  -     HUMAN PAPILLOMA VIRUS (GARDASIL 9) VACCINE [[id number]]    Morbid obesity (H)  Continue to work on increasing activity and improving nutrition     Chronic insomnia  -     traZODone (DESYREL) 50 MG tablet; Take 1 tablet (50 mg) by mouth At Bedtime    Screening for HIV (human immunodeficiency virus)  Declined   Need for hepatitis C screening test  declined  Other orders  -     REVIEW OF HEALTH MAINTENANCE  "PROTOCOL ORDERS          Patient has been advised of split billing requirements and indicates understanding: Yes    COUNSELING:  Reviewed preventive health counseling, as reflected in patient instructions    Estimated body mass index is 40.77 kg/m  as calculated from the following:    Height as of this encounter: 1.721 m (5' 7.75\").    Weight as of this encounter: 120.7 kg (266 lb 3.2 oz).    Weight management plan: Discussed healthy diet and exercise guidelines    She reports that she has never smoked. She has never used smokeless tobacco.      Call or return to the clinic with any worsening of symptoms or no resolution. Patient/Parent verbalized understanding and is in agreement. Medication side effects reviewed.   Current Outpatient Medications   Medication Sig Dispense Refill     desogestrel-ethinyl estradiol (APRI) 0.15-30 MG-MCG tablet Take 1 tablet by mouth daily 84 tablet 3     hydrOXYzine (ATARAX) 25 MG tablet Take 1 tablet (25 mg) by mouth 3 times daily as needed for anxiety 30 tablet 0     traZODone (DESYREL) 50 MG tablet Take 1 tablet (50 mg) by mouth At Bedtime 30 tablet 5     venlafaxine (EFFEXOR-XR) 75 MG 24 hr capsule Take 1 capsule (75 mg) by mouth daily 90 capsule 1     Chart documentation with Dragon Voice recognition Software. Although reviewed after completion, some words and grammatical errors may remain.      MARIO [facility]  "

## 2022-02-23 NOTE — NURSING NOTE
"Chief Complaint   Patient presents with     Physical     pap due.       Initial BP (!) 130/92 (BP Location: Right arm, Patient Position: Chair, Cuff Size: Adult Large)   Pulse 80   Temp 98.2  F (36.8  C) (Tympanic)   Resp 18   Ht 1.721 m (5' 7.75\")   Wt 120.7 kg (266 lb 3.2 oz)   LMP 02/14/2022 (Exact Date)   Breastfeeding No   BMI 40.77 kg/m   Estimated body mass index is 40.77 kg/m  as calculated from the following:    Height as of this encounter: 1.721 m (5' 7.75\").    Weight as of this encounter: 120.7 kg (266 lb 3.2 oz).    Patient presents to the clinic using No DME    Health Maintenance that is potentially due pending provider review:  Pap Smear    Possibly completing today per provider review.    Is there anyone who you would like to be able to receive your results? No  If yes have patient fill out BRANDY  Kendy Wilks CMA    "

## 2022-02-24 ASSESSMENT — PATIENT HEALTH QUESTIONNAIRE - PHQ9: SUM OF ALL RESPONSES TO PHQ QUESTIONS 1-9: 9

## 2022-02-25 LAB
BKR LAB AP GYN ADEQUACY: NORMAL
BKR LAB AP GYN INTERPRETATION: NORMAL
BKR LAB AP HPV REFLEX: NORMAL
BKR LAB AP LMP: NORMAL
BKR LAB AP PREVIOUS ABNORMAL: NORMAL
PATH REPORT.COMMENTS IMP SPEC: NORMAL
PATH REPORT.COMMENTS IMP SPEC: NORMAL
PATH REPORT.RELEVANT HX SPEC: NORMAL

## 2022-03-01 VITALS
BODY MASS INDEX: 40.35 KG/M2 | TEMPERATURE: 98.2 F | DIASTOLIC BLOOD PRESSURE: 89 MMHG | WEIGHT: 266.2 LBS | RESPIRATION RATE: 18 BRPM | HEIGHT: 68 IN | SYSTOLIC BLOOD PRESSURE: 130 MMHG | HEART RATE: 80 BPM

## 2022-03-05 DIAGNOSIS — F51.04 CHRONIC INSOMNIA: ICD-10-CM

## 2022-03-07 RX ORDER — TRAZODONE HYDROCHLORIDE 50 MG/1
50 TABLET, FILM COATED ORAL AT BEDTIME
Qty: 30 TABLET | Refills: 1 | Status: SHIPPED | OUTPATIENT
Start: 2022-03-07 | End: 2022-03-17

## 2022-03-17 ENCOUNTER — VIRTUAL VISIT (OUTPATIENT)
Dept: FAMILY MEDICINE | Facility: CLINIC | Age: 27
End: 2022-03-17
Payer: COMMERCIAL

## 2022-03-17 DIAGNOSIS — F51.04 CHRONIC INSOMNIA: ICD-10-CM

## 2022-03-17 DIAGNOSIS — F33.41 RECURRENT MAJOR DEPRESSIVE DISORDER, IN PARTIAL REMISSION (H): Primary | ICD-10-CM

## 2022-03-17 DIAGNOSIS — F41.0 PANIC ATTACK: ICD-10-CM

## 2022-03-17 PROCEDURE — 99214 OFFICE O/P EST MOD 30 MIN: CPT | Mod: GT | Performed by: NURSE PRACTITIONER

## 2022-03-17 RX ORDER — VENLAFAXINE HYDROCHLORIDE 150 MG/1
150 CAPSULE, EXTENDED RELEASE ORAL DAILY
Qty: 90 CAPSULE | Refills: 1 | Status: SHIPPED | OUTPATIENT
Start: 2022-03-17 | End: 2022-07-12

## 2022-03-17 RX ORDER — TRAZODONE HYDROCHLORIDE 50 MG/1
50 TABLET, FILM COATED ORAL AT BEDTIME
Qty: 30 TABLET | Refills: 1 | Status: SHIPPED | OUTPATIENT
Start: 2022-03-17 | End: 2022-06-20

## 2022-03-17 RX ORDER — HYDROXYZINE HYDROCHLORIDE 25 MG/1
25 TABLET, FILM COATED ORAL 3 TIMES DAILY PRN
Qty: 30 TABLET | Refills: 3 | Status: SHIPPED | OUTPATIENT
Start: 2022-03-17

## 2022-03-17 ASSESSMENT — ANXIETY QUESTIONNAIRES
5. BEING SO RESTLESS THAT IT IS HARD TO SIT STILL: SEVERAL DAYS
7. FEELING AFRAID AS IF SOMETHING AWFUL MIGHT HAPPEN: NOT AT ALL
2. NOT BEING ABLE TO STOP OR CONTROL WORRYING: SEVERAL DAYS
6. BECOMING EASILY ANNOYED OR IRRITABLE: SEVERAL DAYS
GAD7 TOTAL SCORE: 6
1. FEELING NERVOUS, ANXIOUS, OR ON EDGE: SEVERAL DAYS
GAD7 TOTAL SCORE: 6
7. FEELING AFRAID AS IF SOMETHING AWFUL MIGHT HAPPEN: NOT AT ALL
3. WORRYING TOO MUCH ABOUT DIFFERENT THINGS: SEVERAL DAYS
4. TROUBLE RELAXING: SEVERAL DAYS
GAD7 TOTAL SCORE: 6

## 2022-03-17 ASSESSMENT — PATIENT HEALTH QUESTIONNAIRE - PHQ9
10. IF YOU CHECKED OFF ANY PROBLEMS, HOW DIFFICULT HAVE THESE PROBLEMS MADE IT FOR YOU TO DO YOUR WORK, TAKE CARE OF THINGS AT HOME, OR GET ALONG WITH OTHER PEOPLE: SOMEWHAT DIFFICULT
SUM OF ALL RESPONSES TO PHQ QUESTIONS 1-9: 7
SUM OF ALL RESPONSES TO PHQ QUESTIONS 1-9: 7

## 2022-03-17 NOTE — PROGRESS NOTES
Blanche is a 26 year old who is being evaluated via a billable video visit.      How would you like to obtain your AVS? MyChart  If the video visit is dropped, the invitation should be resent by: [phone]  Will anyone else be joining your video visit? No    Video Start Time: 406    Assessment & Plan     Recurrent major depressive disorder, in partial remission (H)  Increase dose to   - venlafaxine (EFFEXOR-XR) 150 MG 24 hr capsule  Dispense: 90 capsule; Refill: 1  Follow up in 1 month    Chronic insomnia  Refilled   - traZODone (DESYREL) 50 MG tablet  Dispense: 30 tablet; Refill: 1    Panic attack  Refilled   - hydrOXYzine (ATARAX) 25 MG tablet  Dispense: 30 tablet; Refill: 3      Psychiatry phone number given to call to schedule   Continue psychotherapy    Depression Screening Follow Up    PHQ 3/17/2022   PHQ-9 Total Score 7   Q9: Thoughts of better off dead/self-harm past 2 weeks Several days   F/U: Thoughts of suicide or self-harm Yes   F/U: Self harm-plan Yes   F/U: Self-harm action No   F/U: Safety concerns No       Call or return to the clinic with any worsening of symptoms or no resolution. Patient/Parent verbalized understanding and is in agreement. Medication side effects reviewed.   Current Outpatient Medications   Medication Sig Dispense Refill     desogestrel-ethinyl estradiol (APRI) 0.15-30 MG-MCG tablet Take 1 tablet by mouth daily 84 tablet 3     hydrOXYzine (ATARAX) 25 MG tablet Take 1 tablet (25 mg) by mouth 3 times daily as needed for anxiety 30 tablet 3     traZODone (DESYREL) 50 MG tablet Take 1 tablet (50 mg) by mouth At Bedtime 30 tablet 1     venlafaxine (EFFEXOR-XR) 150 MG 24 hr capsule Take 1 capsule (150 mg) by mouth daily 90 capsule 1     Chart documentation with Dragon Voice recognition Software. Although reviewed after completion, some words and grammatical errors may remain.  As the provider for this telephone/video service, I attest that I introduced myself to the patient, provided  my credentials, disclosed my location, and determined that, based on a review of the patient's chart and/or a discussion with members of the patient's treatment team, a telephone/video visit is an appropriate and effective means of providing this service. The patient and I mutually agree that this visit is appropriate for telephone/video visit as well.    MARIO [facility]    Rajesh Mancia is a 26 year old who presents for the following health issues     HPI     Depression and Anxiety Follow-Up    How are you doing with your depression since your last visit? Improved increase dose     How are you doing with your anxiety since your last visit?  Improved     Are you having other symptoms that might be associated with depression or anxiety? No    Have you had a significant life event? No     Do you have any concerns with your use of alcohol or other drugs? No    Social History     Tobacco Use     Smoking status: Never Smoker     Smokeless tobacco: Never Used   Vaping Use     Vaping Use: Some days     Substances: Nicotine     Devices: Disposable     Passive vaping exposure: Yes   Substance Use Topics     Alcohol use: Yes     Comment: Maybe 2-3 drinks a week.     Drug use: Never     PHQ 2/2/2022 2/23/2022 3/17/2022   PHQ-9 Total Score 8 9 6   Q9: Thoughts of better off dead/self-harm past 2 weeks Not at all Not at all Several days   F/U: Thoughts of suicide or self-harm - - -   F/U: Self harm-plan - - -   F/U: Self-harm action - - -   F/U: Safety concerns - - -     MIGUEL-7 SCORE 9/9/2021 12/2/2021 2/2/2022   Total Score - 8 (mild anxiety) 5 (mild anxiety)   Total Score 9 8 5     Last PHQ-9 3/17/2022   1.  Little interest or pleasure in doing things 1   2.  Feeling down, depressed, or hopeless 1   3.  Trouble falling or staying asleep, or sleeping too much 1   4.  Feeling tired or having little energy 1   5.  Poor appetite or overeating 0   6.  Feeling bad about  yourself 1   7.  Trouble concentrating 0   8.  Moving slowly or restless 1   Q9: Thoughts of better off dead/self-harm past 2 weeks 1   PHQ-9 Total Score 7   Difficulty at work, home, or with people -   In the past two weeks have you had thoughts of suicide or self harm? Yes   Do you have concerns about your personal safety or the safety of others? No   In the past 2 weeks have you thought about a plan or had intention to harm yourself? Yes   In the past 2 weeks have you acted on these thoughts in any way? No     MIGUEL-7  3/17/2022   1. Feeling nervous, anxious, or on edge 1   2. Not being able to stop or control worrying 1   3. Worrying too much about different things 1   4. Trouble relaxing 1   5. Being so restless that it is hard to sit still 1   6. Becoming easily annoyed or irritable 1   7. Feeling afraid, as if something awful might happen 0   MIGUEL-7 Total Score 6   If you checked any problems, how difficult have they made it for you to do your work, take care of things at home, or get along with other people? -                 Review of Systems   Constitutional, HEENT, cardiovascular, pulmonary, GI, , musculoskeletal, neuro, skin, endocrine and psych systems are negative, except as otherwise noted.      Objective           Vitals:  No vitals were obtained today due to virtual visit.    Physical Exam   GENERAL: Healthy, alert and no distress  EYES: Eyes grossly normal to inspection.  No discharge or erythema, or obvious scleral/conjunctival abnormalities.  RESP: No audible wheeze, cough, or visible cyanosis.  No visible retractions or increased work of breathing.    SKIN: Visible skin clear. No significant rash, abnormal pigmentation or lesions.  NEURO: Cranial nerves grossly intact.  Mentation and speech appropriate for age.  PSYCH: Mentation appears normal, affect normal/bright, judgement and insight intact, normal speech and appearance well-groomed.    Office Visit on 02/23/2022   Component Date Value Ref  Range Status     Interpretation 02/23/2022 Negative for Intraepithelial Lesion or Malignancy (NILM)    Final     Comment 02/23/2022    Final                    Value:This result contains rich text formatting which cannot be displayed here.     Specimen Adequacy 02/23/2022 Satisfactory for evaluation, endocervical/transformation zone component present   Final     Clinical Information 02/23/2022    Final                    Value:This result contains rich text formatting which cannot be displayed here.     LMP/Menopause Date 02/23/2022    Final                    Value:This result contains rich text formatting which cannot be displayed here.     Reflex Testing 02/23/2022 Yes if ASCUS   Final     Previous Abnormal? 02/23/2022    Final                    Value:This result contains rich text formatting which cannot be displayed here.     Performing Labs 02/23/2022    Final                    Value:This result contains rich text formatting which cannot be displayed here.               Video-Visit Details    Type of service:  Video Visit    Video End Time:415    Originating Location (pt. Location): Home    Distant Location (provider location):  [facility]     Platform used for Video Visit: [employer]onto[employer]

## 2022-03-17 NOTE — PATIENT INSTRUCTIONS
MENTAL HEALTH REFERRAL  - Adult; Psychiatry; Psychiatry; Collaborative Care Psychiatry Service/Bridge to Long-Term Psychiatry as indicated ([phone]); Yes

## 2022-03-18 ASSESSMENT — PATIENT HEALTH QUESTIONNAIRE - PHQ9: SUM OF ALL RESPONSES TO PHQ QUESTIONS 1-9: 7

## 2022-03-18 ASSESSMENT — ANXIETY QUESTIONNAIRES: GAD7 TOTAL SCORE: 6

## 2022-06-18 DIAGNOSIS — F51.04 CHRONIC INSOMNIA: ICD-10-CM

## 2022-06-20 RX ORDER — TRAZODONE HYDROCHLORIDE 50 MG/1
50 TABLET, FILM COATED ORAL AT BEDTIME
Qty: 30 TABLET | Refills: 1 | Status: SHIPPED | OUTPATIENT
Start: 2022-06-20 | End: 2022-08-19

## 2022-06-30 ENCOUNTER — E-VISIT (OUTPATIENT)
Dept: FAMILY MEDICINE | Facility: CLINIC | Age: 27
End: 2022-06-30
Payer: COMMERCIAL

## 2022-06-30 DIAGNOSIS — K29.00 ACUTE GASTRITIS WITHOUT HEMORRHAGE, UNSPECIFIED GASTRITIS TYPE: Primary | ICD-10-CM

## 2022-06-30 PROCEDURE — 99421 OL DIG E/M SVC 5-10 MIN: CPT | Performed by: NURSE PRACTITIONER

## 2022-06-30 RX ORDER — FAMOTIDINE 40 MG/1
40 TABLET, FILM COATED ORAL 2 TIMES DAILY
Qty: 60 TABLET | Refills: 0 | Status: SHIPPED | OUTPATIENT
Start: 2022-06-30 | End: 2022-07-12

## 2022-06-30 NOTE — PATIENT INSTRUCTIONS
Thank you for choosing us for your care. I have placed an order for a prescription so that you can start treatment for gastritis.  View your full visit summary for details by clicking on the link below. Your pharmacist will able to address any questions you may have about the medication.     If you're not feeling, please schedule an appointment.  You can schedule an appointment right here in [place], or call [phone]  If the visit is for the same symptoms as your eVisit, we'll refund the cost of your eVisit if seen within seven days.

## 2022-07-02 ENCOUNTER — MYC MEDICAL ADVICE (OUTPATIENT)
Dept: FAMILY MEDICINE | Facility: CLINIC | Age: 27
End: 2022-07-02

## 2022-07-05 ENCOUNTER — TELEPHONE (OUTPATIENT)
Dept: FAMILY MEDICINE | Facility: CLINIC | Age: 27
End: 2022-07-05

## 2022-07-05 NOTE — TELEPHONE ENCOUNTER
Left message on unidentified voicemail to offer sooner appt with Lilliana Lubin.   Liliana MANSFIELD RN

## 2022-07-05 NOTE — TELEPHONE ENCOUNTER
Pt called back regarding abdominal pain and constipation, she has been to ER twice already for these symptoms. First available appt made for 7/12 with Lilliana Lubin. Pt with keep track of foods and symptoms to see if there is a link. Yudi Stone RN

## 2022-07-12 ENCOUNTER — OFFICE VISIT (OUTPATIENT)
Dept: FAMILY MEDICINE | Facility: CLINIC | Age: 27
End: 2022-07-12
Payer: COMMERCIAL

## 2022-07-12 VITALS
BODY MASS INDEX: 37.68 KG/M2 | RESPIRATION RATE: 14 BRPM | DIASTOLIC BLOOD PRESSURE: 80 MMHG | HEART RATE: 94 BPM | OXYGEN SATURATION: 98 % | TEMPERATURE: 97.3 F | WEIGHT: 246 LBS | SYSTOLIC BLOOD PRESSURE: 136 MMHG

## 2022-07-12 DIAGNOSIS — K59.00 CONSTIPATION, UNSPECIFIED CONSTIPATION TYPE: Primary | ICD-10-CM

## 2022-07-12 DIAGNOSIS — F33.41 RECURRENT MAJOR DEPRESSIVE DISORDER, IN PARTIAL REMISSION (H): ICD-10-CM

## 2022-07-12 DIAGNOSIS — K29.00 ACUTE GASTRITIS WITHOUT HEMORRHAGE, UNSPECIFIED GASTRITIS TYPE: ICD-10-CM

## 2022-07-12 LAB — TSH SERPL DL<=0.005 MIU/L-ACNC: 1.64 MU/L (ref 0.4–4)

## 2022-07-12 PROCEDURE — 36415 COLL VENOUS BLD VENIPUNCTURE: CPT | Performed by: NURSE PRACTITIONER

## 2022-07-12 PROCEDURE — 99214 OFFICE O/P EST MOD 30 MIN: CPT | Performed by: NURSE PRACTITIONER

## 2022-07-12 PROCEDURE — 84443 ASSAY THYROID STIM HORMONE: CPT | Performed by: NURSE PRACTITIONER

## 2022-07-12 RX ORDER — VENLAFAXINE HYDROCHLORIDE 75 MG/1
75 TABLET, EXTENDED RELEASE ORAL DAILY
Qty: 90 TABLET | Refills: 1 | Status: SHIPPED | OUTPATIENT
Start: 2022-07-12 | End: 2022-07-13

## 2022-07-12 RX ORDER — AMOXICILLIN 250 MG
1 CAPSULE ORAL DAILY
COMMUNITY
Start: 2022-07-02 | End: 2022-07-12

## 2022-07-12 RX ORDER — VENLAFAXINE HYDROCHLORIDE 150 MG/1
150 CAPSULE, EXTENDED RELEASE ORAL DAILY
Qty: 90 CAPSULE | Refills: 1 | Status: SHIPPED | OUTPATIENT
Start: 2022-07-12 | End: 2022-08-08

## 2022-07-12 RX ORDER — POLYETHYLENE GLYCOL 3350 17 G/17G
17 POWDER, FOR SOLUTION ORAL
COMMUNITY
End: 2022-07-12

## 2022-07-12 RX ORDER — FAMOTIDINE 40 MG/1
40 TABLET, FILM COATED ORAL 2 TIMES DAILY
Qty: 180 TABLET | Refills: 1 | Status: SHIPPED | OUTPATIENT
Start: 2022-07-12 | End: 2022-11-01

## 2022-07-12 RX ORDER — POLYETHYLENE GLYCOL 3350 17 G/17G
17 POWDER, FOR SOLUTION ORAL DAILY
COMMUNITY
Start: 2022-07-12 | End: 2023-04-14

## 2022-07-12 RX ORDER — AMOXICILLIN 250 MG
2 CAPSULE ORAL DAILY
Qty: 180 TABLET | Refills: 0 | Status: SHIPPED | OUTPATIENT
Start: 2022-07-12 | End: 2023-04-14

## 2022-07-12 ASSESSMENT — PATIENT HEALTH QUESTIONNAIRE - PHQ9
SUM OF ALL RESPONSES TO PHQ QUESTIONS 1-9: 13
10. IF YOU CHECKED OFF ANY PROBLEMS, HOW DIFFICULT HAVE THESE PROBLEMS MADE IT FOR YOU TO DO YOUR WORK, TAKE CARE OF THINGS AT HOME, OR GET ALONG WITH OTHER PEOPLE: SOMEWHAT DIFFICULT
SUM OF ALL RESPONSES TO PHQ QUESTIONS 1-9: 13

## 2022-07-12 ASSESSMENT — PAIN SCALES - GENERAL: PAINLEVEL: NO PAIN (0)

## 2022-07-12 NOTE — PROGRESS NOTES
Assessment & Plan     Constipation, unspecified constipation type  Continue MiraLAX  Add in Metamucil in the morning  - XR Abdomen 2 Views  - TSH with free T4 reflex  1 to 2 tablets daily  - senna-docusate (SENOKOT-S/PERICOLACE) 8.6-50 MG tablet  Dispense: 180 tablet; Refill: 0  - TSH with free T4 reflex  Increase fiber in the diet  Increase water consumption  Okay to repeat mag citrate this week if needed    Acute gastritis without hemorrhage, unspecified gastritis type  Continue  - famotidine (PEPCID) 40 MG tablet  Dispense: 180 tablet; Refill: 1    Recurrent major depressive disorder, in partial remission (H)  Increase dose to 225  - venlafaxine (EFFEXOR-ER) 75 MG 24 hr tablet  Dispense: 90 tablet; Refill: 1  - venlafaxine (EFFEXOR XR) 150 MG 24 hr capsule  Dispense: 90 capsule; Refill: 1    Ongoing psychotherapy    Depression Screening Follow Up    PHQ 7/12/2022   PHQ-9 Total Score 13   Q9: Thoughts of better off dead/self-harm past 2 weeks Several days   F/U: Thoughts of suicide or self-harm No   F/U: Self harm-plan -   F/U: Self-harm action -   F/U: Safety concerns No     Call or return to the clinic with any worsening of symptoms or no resolution. Patient/Parent verbalized understanding and is in agreement. Medication side effects reviewed.   Current Outpatient Medications   Medication Sig Dispense Refill     desogestrel-ethinyl estradiol (APRI) 0.15-30 MG-MCG tablet Take 1 tablet by mouth daily 84 tablet 3     famotidine (PEPCID) 40 MG tablet Take 1 tablet (40 mg) by mouth 2 times daily 180 tablet 1     hydrOXYzine (ATARAX) 25 MG tablet Take 1 tablet (25 mg) by mouth 3 times daily as needed for anxiety 30 tablet 3     polyethylene glycol (MIRALAX) 17 GM/Dose powder Take 17 g by mouth daily       senna-docusate (SENOKOT-S/PERICOLACE) 8.6-50 MG tablet Take 2 tablets by mouth daily 180 tablet 0     traZODone (DESYREL) 50 MG tablet TAKE 1 TABLET (50 MG) BY MOUTH AT BEDTIME 30 tablet 1     venlafaxine  (EFFEXOR XR) 150 MG 24 hr capsule Take 1 capsule (150 mg) by mouth daily Take with 75 mg daily 90 capsule 1     venlafaxine (EFFEXOR-ER) 75 MG 24 hr tablet Take 1 tablet (75 mg) by mouth daily Add to 150 mg ER take daily 90 tablet 1     Chart documentation with Dragon Voice recognition Software. Although reviewed after completion, some words and grammatical errors may remain.    Follow-up mental health 6-8 weeks  MARIO Kilgore CNP  [facility]    Rajesh Mancia is a 27 year old, presenting for the following health issues:  Abdominal Pain      History of Present Illness       Reason for visit:  Constipation, stomach pains  Symptom onset:  1-2 weeks ago  Symptoms include:  Constpation on and off with stomach pains  Symptom intensity:  Moderate  Symptom progression:  Improving  Had these symptoms before:  Yes  Has tried/received treatment for these symptoms:  No  What makes it worse:  When it s bad eating makes my feel sick or throw up  What makes it better:  Senna, drinking lots of water    She eats 4 or more servings of fruits and vegetables daily.She consumes 0 sweetened beverage(s) daily.She exercises with enough effort to increase her heart rate 10 to 19 minutes per day.  She exercises with enough effort to increase her heart rate 3 or less days per week.   She is taking medications regularly.    Today's PHQ-9         PHQ-9 Total Score: 13    PHQ-9 Q9 Thoughts of better off dead/self-harm past 2 weeks :   Several days  Thoughts of suicide or self harm: (P) No  Self-harm Plan:     Self-harm Action:       Safety concerns for self or others: (P) No    How difficult have these problems made it for you to do your work, take care of things at home, or get along with other people: Somewhat difficult     Enema minimal relief  Mag citrate this past weekend. Results with that  Stopped the mirilax    IBS constipation symptoms with stress  Lori Manuel Therapist weekly  Helpful  for her depression  Dose increase desired.                 Review of Systems   Constitutional, HEENT, cardiovascular, pulmonary, GI, , musculoskeletal, neuro, skin, endocrine and psych systems are negative, except as otherwise noted.      Objective    /80   Pulse 94   Temp 97.3  F (36.3  C) (Tympanic)   Resp 14   Wt 111.6 kg (246 lb)   LMP 06/21/2022   SpO2 98%   BMI 37.68 kg/m    Body mass index is 37.68 kg/m .  Physical Exam   GENERAL: healthy, alert and no distress  EYES: Eyes grossly normal to inspection, PERRL and conjunctivae and sclerae normal  HENT: ear canals and TM's normal, nose and mouth without ulcers or lesions  NECK: no adenopathy, no asymmetry, masses, or scars and thyroid normal to palpation  RESP: lungs clear to auscultation - no rales, rhonchi or wheezes  CV: regular rate and rhythm, normal S1 S2, no S3 or S4, no murmur, click or rub, no peripheral edema and peripheral pulses strong  ABDOMEN: tenderness LLQ and bowel sounds normal  MS: no gross musculoskeletal defects noted, no edema  SKIN: no suspicious lesions or rashes  NEURO: Normal strength and tone, mentation intact and speech normal  PSYCH: mentation appears normal, affect normal/bright    Office Visit on 02/23/2022   Component Date Value Ref Range Status     Interpretation 02/23/2022 Negative for Intraepithelial Lesion or Malignancy (NILM)    Final     Comment 02/23/2022    Final                    Value:This result contains rich text formatting which cannot be displayed here.     Specimen Adequacy 02/23/2022 Satisfactory for evaluation, endocervical/transformation zone component present   Final     Clinical Information 02/23/2022    Final                    Value:This result contains rich text formatting which cannot be displayed here.     LMP/Menopause Date 02/23/2022    Final                    Value:This result contains rich text formatting which cannot be displayed here.     Reflex Testing 02/23/2022 Yes if ASCUS    Final     Previous Abnormal? 02/23/2022    Final                    Value:This result contains rich text formatting which cannot be displayed here.     Performing Labs 02/23/2022    Final                    Value:This result contains rich text formatting which cannot be displayed here.       Recent Results (from the past 744 hour(s))   XR Abdomen 2 Views    Narrative    XR ABDOMEN 2VIEWS 7/12/2022 10:56 AM    HISTORY: Constipation, unspecified constipation type    COMPARISON: None.      Impression    IMPRESSION: Moderate to large stool throughout the colon consistent  with history. No obstruction or free intraperitoneal air.    RAMANA MARQUEZ MD         SYSTEM ID:  [id number]     Results for orders placed or performed in visit on 07/12/22   XR Abdomen 2 Views     Status: None    Narrative    XR ABDOMEN 2VIEWS 7/12/2022 10:56 AM    HISTORY: Constipation, unspecified constipation type    COMPARISON: None.      Impression    IMPRESSION: Moderate to large stool throughout the colon consistent  with history. No obstruction or free intraperitoneal air.    RAMANA MARQUEZ MD         SYSTEM ID:  [id number]   Results for orders placed or performed in visit on 07/12/22   TSH with free T4 reflex     Status: Normal   Result Value Ref Range    TSH 1.64 0.40 - 4.00 mU/L                 .  ..

## 2022-07-13 DIAGNOSIS — F33.41 RECURRENT MAJOR DEPRESSIVE DISORDER, IN PARTIAL REMISSION (H): ICD-10-CM

## 2022-07-13 RX ORDER — VENLAFAXINE HYDROCHLORIDE 75 MG/1
75 TABLET, EXTENDED RELEASE ORAL DAILY
Qty: 90 TABLET | Refills: 0 | Status: SHIPPED | OUTPATIENT
Start: 2022-07-13 | End: 2023-05-10

## 2022-07-13 NOTE — TELEPHONE ENCOUNTER
Venlafaxine ER 75mg tabs $300 through pt's insurance. Please send new script for capsules instead of tablets.

## 2022-07-20 ENCOUNTER — OFFICE VISIT (OUTPATIENT)
Dept: FAMILY MEDICINE | Facility: CLINIC | Age: 27
End: 2022-07-20
Payer: COMMERCIAL

## 2022-07-20 VITALS
RESPIRATION RATE: 16 BRPM | BODY MASS INDEX: 36.91 KG/M2 | DIASTOLIC BLOOD PRESSURE: 72 MMHG | SYSTOLIC BLOOD PRESSURE: 132 MMHG | OXYGEN SATURATION: 98 % | HEART RATE: 87 BPM | WEIGHT: 241 LBS | TEMPERATURE: 98 F

## 2022-07-20 DIAGNOSIS — Z11.59 NEED FOR HEPATITIS C SCREENING TEST: ICD-10-CM

## 2022-07-20 DIAGNOSIS — R10.32 LLQ ABDOMINAL PAIN: ICD-10-CM

## 2022-07-20 DIAGNOSIS — K59.00 CONSTIPATION, UNSPECIFIED CONSTIPATION TYPE: Primary | ICD-10-CM

## 2022-07-20 DIAGNOSIS — Z11.4 SCREENING FOR HIV (HUMAN IMMUNODEFICIENCY VIRUS): ICD-10-CM

## 2022-07-20 PROCEDURE — 99214 OFFICE O/P EST MOD 30 MIN: CPT | Performed by: NURSE PRACTITIONER

## 2022-07-20 ASSESSMENT — PAIN SCALES - GENERAL: PAINLEVEL: MODERATE PAIN (5)

## 2022-07-20 ASSESSMENT — PATIENT HEALTH QUESTIONNAIRE - PHQ9
SUM OF ALL RESPONSES TO PHQ QUESTIONS 1-9: 11
10. IF YOU CHECKED OFF ANY PROBLEMS, HOW DIFFICULT HAVE THESE PROBLEMS MADE IT FOR YOU TO DO YOUR WORK, TAKE CARE OF THINGS AT HOME, OR GET ALONG WITH OTHER PEOPLE: SOMEWHAT DIFFICULT
SUM OF ALL RESPONSES TO PHQ QUESTIONS 1-9: 11

## 2022-07-20 NOTE — PROGRESS NOTES
Assessment & Plan     Constipation, unspecified constipation type  Chronic and ongoing despite multiple medication trials  , MiraLAX, Colace, Senokot, mag citrate, milk of magnesia  Continue to increase fiber in the diet.  Continue MiraLAX.  Continue senna  Trial new medication  - plecanatide (TRULANCE) 3 MG tablet  Dispense: 30 tablet; Refill: 2    LLQ abdominal pain  Chronic and ongoing constipation.  Left lower quadrant abdominal pain intermittent.  Imaging recommended  - CT Abdomen w/o Contrast    Screening for HIV (human immunodeficiency virus)  Declined need for same    Need for hepatitis C screening test  Declined need for same        Depression Screening Follow Up    PHQ 7/20/2022   PHQ-9 Total Score 11   Q9: Thoughts of better off dead/self-harm past 2 weeks Several days   F/U: Thoughts of suicide or self-harm Yes   F/U: Self harm-plan Yes   F/U: Self-harm action No   F/U: Safety concerns No     Continue psychotherapy  Increase Effexor as previously discussed    Follow Up  6 weeks  Call or return to the clinic with any worsening of symptoms or no resolution. Patient/Parent verbalized understanding and is in agreement. Medication side effects reviewed.   Current Outpatient Medications   Medication Sig Dispense Refill     desogestrel-ethinyl estradiol (APRI) 0.15-30 MG-MCG tablet Take 1 tablet by mouth daily 84 tablet 3     famotidine (PEPCID) 40 MG tablet Take 1 tablet (40 mg) by mouth 2 times daily 180 tablet 1     hydrOXYzine (ATARAX) 25 MG tablet Take 1 tablet (25 mg) by mouth 3 times daily as needed for anxiety 30 tablet 3     plecanatide (TRULANCE) 3 MG tablet Take 1 tablet (3 mg) by mouth daily 30 tablet 2     polyethylene glycol (MIRALAX) 17 GM/Dose powder Take 17 g by mouth daily       senna-docusate (SENOKOT-S/PERICOLACE) 8.6-50 MG tablet Take 2 tablets by mouth daily 180 tablet 0     traZODone (DESYREL) 50 MG tablet TAKE 1 TABLET (50 MG) BY MOUTH AT BEDTIME 30 tablet 1     venlafaxine (EFFEXOR  XR) 150 MG 24 hr capsule Take 1 capsule (150 mg) by mouth daily Take with 75 mg daily 90 capsule 1     venlafaxine (EFFEXOR-ER) 75 MG 24 hr tablet Take 1 tablet (75 mg) by mouth daily Add to 150 mg ER take daily 90 tablet 0     Chart documentation with Dragon Voice recognition Software. Although reviewed after completion, some words and grammatical errors may remain.    Lilliana Lubin, MARIO CNP  [facility]    Rajesh Mancia is a 27 year old, presenting for the following health issues:  Abdominal Pain      HPI     FOLLOW UP constipation  Reason for visit:  Constipation, stomach pains  Symptom onset:  1-2 weeks ago  Symptoms include:  Constpation on and off with stomach pains  Symptom intensity:  Moderate  Symptom progression:  Improving  Had these symptoms before:  Yes  Has tried/received treatment for these symptoms:  No  What makes it worse:  When it s bad eating makes my feel sick or throw up  What makes it better:  Senna, drinking lots of water  Continues to take MiraLAX  Previous imaging reviewed.  Previous labs reviewed.  Has not yet increased her Effexor dose due to problems with insurance.  Will check with them to see if the tablets were covered            Review of Systems   Constitutional, HEENT, cardiovascular, pulmonary, GI, , musculoskeletal, neuro, skin, endocrine and psych systems are negative, except as otherwise noted.      Objective    /72   Pulse 87   Temp 98  F (36.7  C) (Tympanic)   Resp 16   Wt 109.3 kg (241 lb)   LMP 06/21/2022   SpO2 98%   BMI 36.91 kg/m    Body mass index is 36.91 kg/m .  Physical Exam   GENERAL: healthy, alert and no distress  EYES: Eyes grossly normal to inspection, PERRL and conjunctivae and sclerae normal  HENT: ear canals and TM's normal, nose and mouth without ulcers or lesions  NECK: no adenopathy, no asymmetry, masses, or scars and thyroid normal to palpation  RESP: lungs clear to auscultation - no rales, rhonchi  or wheezes  CV: regular rate and rhythm, normal S1 S2, no S3 or S4, no murmur, click or rub, no peripheral edema and peripheral pulses strong  ABDOMEN: tenderness LLQ and bowel sounds normal  MS: no gross musculoskeletal defects noted, no edema  SKIN: no suspicious lesions or rashes  NEURO: Normal strength and tone, mentation intact and speech normal  PSYCH: mentation appears normal and affect flat    Office Visit on 07/12/2022   Component Date Value Ref Range Status     TSH 07/12/2022 1.64  0.40 - 4.00 mU/L Final     Recent Results (from the past 744 hour(s))   XR Abdomen 2 Views    Narrative    XR ABDOMEN 2VIEWS 7/12/2022 10:56 AM    HISTORY: Constipation, unspecified constipation type    COMPARISON: None.      Impression    IMPRESSION: Moderate to large stool throughout the colon consistent  with history. No obstruction or free intraperitoneal air.    RAMANA MARQUEZ MD         SYSTEM ID:  [id number]                   .  ..  Answers for HPI/ROS submitted by the patient on 7/20/2022  If you checked off any problems, how difficult have these problems made it for you to do your work, take care of things at home, or get along with other people?: Somewhat difficult  PHQ9 TOTAL SCORE: 11

## 2022-07-21 ENCOUNTER — HOSPITAL ENCOUNTER (OUTPATIENT)
Dept: CT IMAGING | Facility: CLINIC | Age: 27
Discharge: HOME | End: 2022-07-21
Attending: NURSE PRACTITIONER | Admitting: NURSE PRACTITIONER
Payer: COMMERCIAL

## 2022-07-21 DIAGNOSIS — R10.32 LLQ ABDOMINAL PAIN: ICD-10-CM

## 2022-07-21 PROCEDURE — 250N000009 HC RX 250: Performed by: RADIOLOGY

## 2022-07-21 PROCEDURE — 74177 CT ABD & PELVIS W/CONTRAST: CPT

## 2022-07-21 PROCEDURE — 250N000011 HC RX IP 250 OP 636: Performed by: RADIOLOGY

## 2022-07-21 RX ORDER — IOPAMIDOL 755 MG/ML
100 INJECTION, SOLUTION INTRAVASCULAR ONCE
Status: COMPLETED | OUTPATIENT
Start: 2022-07-21 | End: 2022-07-21

## 2022-07-21 RX ADMIN — IOPAMIDOL 100 ML: 755 INJECTION, SOLUTION INTRAVENOUS at 17:46

## 2022-07-21 RX ADMIN — SODIUM CHLORIDE 69 ML: 9 INJECTION, SOLUTION INTRAVENOUS at 17:47

## 2022-07-22 ENCOUNTER — TELEPHONE (OUTPATIENT)
Dept: FAMILY MEDICINE | Facility: CLINIC | Age: 27
End: 2022-07-22

## 2022-07-22 NOTE — TELEPHONE ENCOUNTER
Prior Authorization Retail Medication Request    Medication/Dose: Trulance 3mg tablets  ICD code (if different than what is on RX):    Previously Tried and Failed:    Rationale:      Cover my meds key: [id number]      Pharmacy Information (if different than what is on RX)  Name:  [facility]  Phone:  [employer]

## 2022-07-26 NOTE — TELEPHONE ENCOUNTER
Central Prior Authorization Team   Phone: [phone]      PA Initiation    Medication: Trulance  Insurance Company: [employer] - Phone [phone] Fax [phone]  Pharmacy Filling the Rx: [id number] - [place], [place] - [address] [place] [address]  Filling Pharmacy Phone: [phone]  Filling Pharmacy Fax:    Start Date: 7/26/2022

## 2022-07-28 NOTE — TELEPHONE ENCOUNTER
PRIOR AUTHORIZATION DENIED    Medication: Trulance-DENIED    Denial Date: 7/26/2022    Denial Rational: Patient must have a history of trial & failure to the formulary alternative(s) or have a contraindication or intolerance to the formulary alternatives: Linzess and Lubiprostone (generic Amitiza)        Appeal Information:     If provider would like to appeal please provide a letter of medical necessity stating why formulary alternatives would not be clinically appropriate for patient and route back to the PA team.

## 2022-08-01 NOTE — TELEPHONE ENCOUNTER
This will have to wait for Lilliana Lubin, or she will have to see one of the providers if it can't

## 2022-08-19 DIAGNOSIS — F51.04 CHRONIC INSOMNIA: ICD-10-CM

## 2022-08-19 RX ORDER — TRAZODONE HYDROCHLORIDE 50 MG/1
TABLET, FILM COATED ORAL
Qty: 30 TABLET | Refills: 1 | Status: SHIPPED | OUTPATIENT
Start: 2022-08-19 | End: 2022-10-18

## 2022-08-19 NOTE — TELEPHONE ENCOUNTER
Prescription approved per [facility] Refill Protocol     Tete Alas     RN MSN

## 2022-08-25 ENCOUNTER — TRANSFERRED RECORDS (OUTPATIENT)
Dept: FAMILY MEDICINE | Facility: CLINIC | Age: 27
End: 2022-08-25

## 2022-09-29 ENCOUNTER — HOSPITAL ENCOUNTER (EMERGENCY)
Facility: CLINIC | Age: 27
Discharge: HOME | End: 2022-09-29
Attending: PHYSICIAN ASSISTANT | Admitting: PHYSICIAN ASSISTANT
Payer: MEDICAID

## 2022-09-29 ENCOUNTER — NURSE TRIAGE (OUTPATIENT)
Dept: NURSING | Facility: CLINIC | Age: 27
End: 2022-09-29

## 2022-09-29 VITALS
HEIGHT: 67 IN | RESPIRATION RATE: 16 BRPM | DIASTOLIC BLOOD PRESSURE: 84 MMHG | HEART RATE: 98 BPM | TEMPERATURE: 97.5 F | SYSTOLIC BLOOD PRESSURE: 134 MMHG | WEIGHT: 240 LBS | OXYGEN SATURATION: 100 % | BODY MASS INDEX: 37.67 KG/M2

## 2022-09-29 DIAGNOSIS — J02.9 ACUTE PHARYNGITIS, UNSPECIFIED ETIOLOGY: ICD-10-CM

## 2022-09-29 LAB — DEPRECATED S PYO AG THROAT QL EIA: NEGATIVE

## 2022-09-29 PROCEDURE — 87651 STREP A DNA AMP PROBE: CPT | Performed by: PHYSICIAN ASSISTANT

## 2022-09-29 PROCEDURE — G0463 HOSPITAL OUTPT CLINIC VISIT: HCPCS | Performed by: PHYSICIAN ASSISTANT

## 2022-09-29 PROCEDURE — 99213 OFFICE O/P EST LOW 20 MIN: CPT | Performed by: PHYSICIAN ASSISTANT

## 2022-09-29 RX ORDER — DEXAMETHASONE 4 MG/1
10 TABLET ORAL ONCE
Qty: 3 TABLET | Refills: 0 | Status: SHIPPED | OUTPATIENT
Start: 2022-09-29 | End: 2022-11-01

## 2022-09-29 ASSESSMENT — ENCOUNTER SYMPTOMS
SORE THROAT: 1
COUGH: 1
CONSTITUTIONAL NEGATIVE: 1
FEVER: 0

## 2022-09-29 NOTE — ED PROVIDER NOTES
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  Patient also complains of associated congestion and cough.  She works at a  and there are multiple strep positive kids there.  Denies fevers, chills, rash, neck pain/stiffness, sinus pressure, nasal congestion, nausea, vomiting, diarrhea, abdominal pain, chest pain, or shortness of breath.      Allergies:  Allergies   Allergen Reactions     Dairy Products [Milk Protein Extract] Other (See Comments)     Headaches, stomach aches       Peanuts [Nuts] Other (See Comments)     Headaches, stomach aches       Wheat Gluten [Gluten Meal] Other (See Comments)     Headaches, stomach aches         Problem List:    Patient Active Problem List    Diagnosis Date Noted     Major depression, recurrent (H) 02/23/2022     Priority: Medium     Morbid obesity (H) 02/23/2022     Priority: Medium        Past Medical History:    Past Medical History:   Diagnosis Date     Depressive disorder 2013       Past Surgical History:    No past surgical history on file.    Family History:    Family History   Problem Relation Age of Onset     Hypertension Father      Heart Disease Maternal Grandfather      Cancer Paternal Grandfather      Heart Disease Paternal Grandfather      Hypertension Paternal Grandfather      Hyperlipidemia Paternal Grandfather        Social History:  Marital Status:  Single [1]  Social History     Tobacco Use     Smoking status: Never Smoker     Smokeless tobacco: Never Used   Vaping Use     Vaping Use: Some days     Substances: Nicotine     Devices: Disposable     Passive vaping exposure: Yes   Substance Use Topics     Alcohol use: Yes     Comment: Maybe 2-3 drinks a week.     Drug use: Never        Medications:    dexamethasone (DECADRON) 4  "MG tablet  desogestrel-ethinyl estradiol (APRI) 0.15-30 MG-MCG tablet  famotidine (PEPCID) 40 MG tablet  hydrOXYzine (ATARAX) 25 MG tablet  plecanatide (TRULANCE) 3 MG tablet  polyethylene glycol (MIRALAX) 17 GM/Dose powder  senna-docusate (SENOKOT-S/PERICOLACE) 8.6-50 MG tablet  traZODone (DESYREL) 50 MG tablet  venlafaxine (EFFEXOR XR) 75 MG 24 hr capsule  venlafaxine (EFFEXOR-ER) 75 MG 24 hr tablet          Review of Systems   Constitutional: Negative.  Negative for fever.   HENT: Positive for congestion and sore throat.    Respiratory: Positive for cough.    All other systems reviewed and are negative.      Physical Exam   BP: 134/84  Pulse: 98  Temp: 97.5  F (36.4  C)  Resp: 16  Height: 170.2 cm (5' 7\")  Weight: 108.9 kg (240 lb)  SpO2: 100 %      Physical Exam  Constitutional:       General: She is not in acute distress.     Appearance: Normal appearance. She is well-developed. She is not ill-appearing, toxic-appearing or diaphoretic.   HENT:      Head: Normocephalic and atraumatic.      Right Ear: Tympanic membrane, ear canal and external ear normal.      Left Ear: Tympanic membrane, ear canal and external ear normal.      Nose: Mucosal edema, congestion and rhinorrhea present.      Mouth/Throat:      Lips: Pink.      Mouth: Mucous membranes are moist.      Pharynx: Uvula midline. Posterior oropharyngeal erythema present. No pharyngeal swelling, oropharyngeal exudate or uvula swelling.      Tonsils: No tonsillar exudate or tonsillar abscesses.   Eyes:      Extraocular Movements: Extraocular movements intact.      Conjunctiva/sclera: Conjunctivae normal.      Pupils: Pupils are equal, round, and reactive to light.   Cardiovascular:      Rate and Rhythm: Normal rate and regular rhythm.      Heart sounds: Normal heart sounds.   Pulmonary:      Effort: Pulmonary effort is normal. No respiratory distress.      Breath sounds: Normal breath sounds. No stridor. No wheezing, rhonchi or rales.   Musculoskeletal:      " Cervical back: Full passive range of motion without pain, normal range of motion and neck supple. No rigidity. Normal range of motion.   Lymphadenopathy:      Cervical: No cervical adenopathy.   Skin:     General: Skin is warm and dry.   Neurological:      Mental Status: She is alert and oriented to person, place, and time.   Psychiatric:         Behavior: Behavior is cooperative.         ED Course                 Procedures      Results for orders placed or performed during the hospital encounter of 09/29/22 (from the past 24 hour(s))   Streptococcus A Rapid Scr w Reflx to PCR    Specimen: Throat; Swab   Result Value Ref Range    Group A Strep antigen Negative Negative       Medications - No data to display    Assessments & Plan (with Medical Decision Making)     Pt is a 27 year old female who presents with complaints of sore throat for the past 2 days.  Patient also complains of associated congestion and cough.  She works at a  and there are multiple strep positive kids there.      Pt is afebrile on arrival.  Exam as above.  Rapid strep was negative.  Culture is pending.  Discussed results with patient.  Encouraged symptomatic treatments at home.  Return precautions were reviewed.  Hand-outs were provided.    Patient was sent with single dose of dexamethasone for symptomatic treatment and was instructed to follow-up with PCP in 3-5 days for continued care and management or sooner if new or worsening symptoms.  She is to return to the ED for persistent and/or worsening symptoms.  Patient expressed understanding of the diagnosis and plan and was discharged home in good condition.    I have reviewed the nursing notes.    I have reviewed the findings, diagnosis, plan and need for follow up with the patient.    New Prescriptions    DEXAMETHASONE (DECADRON) 4 MG TABLET    Take 2.5 tablets (10 mg) by mouth once for 1 dose       Final diagnoses:   Acute pharyngitis, unspecified etiology       9/29/2022   [facility]  [facility] EMERGENCY DEPT      Disclaimer:  This note consists of symbols derived from keyboarding, dictation and/or voice recognition software.  As a result, there may be errors in the script that have gone undetected.  Please consider this when interpreting information found in this chart.     Mayra Garcia PA-C  09/29/22 1815

## 2022-09-29 NOTE — Clinical Note
Blanche Lyman was seen and treated in our emergency department on 9/29/2022.  She may return to work on 10/03/2022.  Please excuse patient from work today and tomorrow.  Thank you.       If you have any questions or concerns, please don't hesitate to call.      Mayra Garcia PA-C

## 2022-09-29 NOTE — ED TRIAGE NOTES
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

## 2022-09-29 NOTE — TELEPHONE ENCOUNTER
Pt calling in to nurse triage today with exposure to and s/sx of strep throat. 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 Sore throat for the last 2-3 days. 1 confirmed case a few days ago and 1 confirmed case of strep in class today. S/sx of Pt include; sore throat, horse voice, cough, congestion, runny nose. Pt reports s/sx have been increasing and getting worse over the last few days.  Disposition to Call PCP within 24 hours. Pt is worried about passing infection to other kids and will go to  in [facility] So that she can be tested prior to work tomorrow.   Pt is amenable to plan and verbalizes understanding and agreement.    Simran Martino RN, [place], PHN on 9/29/2022 at 5:04 PM  [facility]  RN utilized sound nursing judgement based on facility triage protocols during this encounter.         Reason for Disposition    [1] Sore throat AND [2] strep throat EXPOSURE (i.e., meets definition) within past 10 days    Additional Information    Negative: SEVERE difficulty breathing (e.g., struggling for each breath, speaks in single words, stridor)    Negative: Sounds like a life-threatening emergency to the triager    Negative: Call about throat culture results    Negative: [1] Sore throat AND [2] diagnosed Strep throat AND [3] taking antibiotic    Negative: [1] Sore throat AND [2] no known Strep throat EXPOSURE (i.e., does not meet definition)    Negative: [1] Sore throat AND [2] strep throat EXPOSURE (i.e., meets definition) > 10 days ago    Negative: [1] Drooling or spitting out saliva (because can't swallow) AND [2] new-onset    Negative: [1] Difficulty breathing AND [2] not severe    Negative: Fever > 104 F (40 C)    Negative: [1] Refuses to drink anything AND [2] for > 12 hours    Negative: [1] Drinking very  "little AND [2] dehydration suspected (e.g., no urine > 12 hours, very dry mouth, very lightheaded)    Negative: Patient sounds very sick or weak to the triager    Negative: SEVERE (e.g., excruciating) throat pain    Answer Assessment - Initial Assessment Questions  1. 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 ONSET: \"How many days ago did the contact occur?\"       1 confirmed today , started in classroom 3 days ago   3. PROVEN STREP: \"Are you sure the person with strep had a positive throat culture or rapid strep test?\"       Yes  4. STREP SYMPTOMS: \"Do YOU have a sore throat, fever, or other symptoms suggestive of strep?\"       Sore throat that started 2-3 days ago and is just getting worse     5. VIRAL SYMPTOMS: \"Are there any symptoms of a cold, such as a runny nose, cough, hoarse voice?\"      Sore throat and cough, runny nose, congestion     6. PREGNANCY: \"Is there any chance you are pregnant?\" \"When was your last menstrual period?\"      No    Protocols used: STREP THROAT EXPOSURE-A-    COVID 19 Nurse Triage Plan/Patient Instructions    Please be aware that novel coronavirus (COVID-19) may be circulating in the community. If you develop symptoms such as fever, cough, or SOB or if you have concerns about the presence of another infection including coronavirus (COVID-19), please contact your health care provider or visit [email or web address].     Disposition/Instructions    Virtual Visit with provider recommended. Reference Visit Selection Guide.  In-Person Visit with provider recommended. Reference Visit Selection Guide.    Thank you for taking steps to prevent the spread of this virus.  o Limit your contact with others.  o Wear a simple mask to cover your cough.  o Wash your hands " well and often.    Resources    [facility]: About COVID-19: [email or web address]    CDC: What to Do If You're Sick: www.cdc.gov/coronavirus/2019-ncov/about/steps-when-sick.html    CDC: Ending Home Isolation: www.cdc.gov/coronavirus/2019-ncov/hcp/disposition-in-home-patients.html     CDC: Caring for Someone: www.cdc.gov/coronavirus/2019-ncov/if-you-are-sick/care-for-someone.html     [facility]: Interim Guidance for Hospital Discharge to Home: www.[facility].[place]./diseases/coronavirus/hcp/hospdischarge.pdf    [facility] clinical trials (COVID-19 research studies): [email or web address].[facility]-clinical-trials     Below are the COVID-19 hotlines at the [place] Department of [facility] ([facility]). Interpreters are available.   o For health questions: Call 540-460-8698 or 1-433.653.8343 (7 a.m. to 7 p.m.)  o For questions about schools and childcare: Call 691-357-0160 or 1-472.783.9420 (7 a.m. to 7 p.m.)

## 2022-09-30 ENCOUNTER — OFFICE VISIT (OUTPATIENT)
Dept: URGENT CARE | Facility: URGENT CARE | Age: 27
End: 2022-09-30
Payer: MEDICAID

## 2022-09-30 ENCOUNTER — MYC MEDICAL ADVICE (OUTPATIENT)
Dept: FAMILY MEDICINE | Facility: CLINIC | Age: 27
End: 2022-09-30

## 2022-09-30 VITALS
RESPIRATION RATE: 18 BRPM | BODY MASS INDEX: 37.67 KG/M2 | SYSTOLIC BLOOD PRESSURE: 137 MMHG | WEIGHT: 240 LBS | OXYGEN SATURATION: 100 % | HEIGHT: 67 IN | DIASTOLIC BLOOD PRESSURE: 84 MMHG | HEART RATE: 87 BPM | TEMPERATURE: 97.9 F

## 2022-09-30 DIAGNOSIS — J06.9 VIRAL URI WITH COUGH: ICD-10-CM

## 2022-09-30 DIAGNOSIS — H66.91 RIGHT ACUTE OTITIS MEDIA: Primary | ICD-10-CM

## 2022-09-30 LAB — GROUP A STREP BY PCR: NOT DETECTED

## 2022-09-30 PROCEDURE — 99213 OFFICE O/P EST LOW 20 MIN: CPT | Performed by: FAMILY MEDICINE

## 2022-09-30 RX ORDER — AMOXICILLIN 875 MG
875 TABLET ORAL 2 TIMES DAILY
Qty: 20 TABLET | Refills: 0 | Status: SHIPPED | OUTPATIENT
Start: 2022-09-30 | End: 2022-10-10

## 2022-09-30 NOTE — PATIENT INSTRUCTIONS
Amoxicillin twice a day for 10 days to treat ear infection if you concerns for strep throat this antibiotic would also take care of this      Drink 100 ounces of water a day to stay hydrated      Ibuprofen 400mg and/or tylenol 500mg every 4 hours as needed for discomfort      For cough (can take all of them together):   - Dextromethorphan 'DM' (over the counter - can be found in Robitussin/Delsym/generic cough meds)  - Honey: lozenges/cough drops or mix honey in warm water        If symptoms get worse return right away to be evaluated

## 2022-09-30 NOTE — PROGRESS NOTES
Assessment & Plan     Right acute otitis media  Viral URI with cough  Stable vitals. Normal lung exam. Right ear infection present start amoxicillin for this. Reassurance provided regarding throat discomfort no signs of abscess seen. Viral illness likely cause of her symptoms leading to right AOM.     See AVS summary for additional recommendations reviewed with patient during this visit.     Miguel Angel Whitfield MD   [facility]    Rajesh Mancia is a 27 year old female who presents to clinic today for the following health issues:  Chief Complaint   Patient presents with     Pharyngitis     Was seen yesterday at [facility]--negative strep test. Positive Covid 3 weeks ago. --was given dexamethasone in clinic yesterday did bring relief     HPI    Patient comes in today for evaluation for illness for the last 3 to 4 days she had a sore throat initially was seen in emergency room yesterday they gave her a dose of Decadron this significantly improved her sore throat she came in today with concerns that may have missed diagnosis of strep throat and she was hoping to get antibiotics today after further conversation just to discuss his having new right ear discomfort with decreased hearing no drainage on the side no recent history of ear infections additionally she has a mild cough which is not bothersome she has no history of asthma.  Denies shortness of breath or difficulty breathing.  Patient recovered from COVID several weeks ago and felt much better after a week and had 2 weeks of no illness.  She has no difficulty opening her mouth.    On birth control. Denies pregnancy    Patient Active Problem List    Diagnosis Date Noted     Major depression, recurrent (H) 02/23/2022     Priority: Medium     Morbid obesity (H) 02/23/2022     Priority: Medium       Current Outpatient Medications   Medication     desogestrel-ethinyl estradiol (APRI) 0.15-30 MG-MCG tablet     hydrOXYzine (ATARAX) 25 MG tablet      "senna-docusate (SENOKOT-S/PERICOLACE) 8.6-50 MG tablet     traZODone (DESYREL) 50 MG tablet     venlafaxine (EFFEXOR XR) 75 MG 24 hr capsule     venlafaxine (EFFEXOR-ER) 75 MG 24 hr tablet     dexamethasone (DECADRON) 4 MG tablet     famotidine (PEPCID) 40 MG tablet     plecanatide (TRULANCE) 3 MG tablet     polyethylene glycol (MIRALAX) 17 GM/Dose powder     No current facility-administered medications for this visit.           Objective    /84   Pulse 87   Temp 97.9  F (36.6  C)   Resp 18   Ht 1.702 m (5' 7\")   Wt 108.9 kg (240 lb)   SpO2 100%   BMI 37.59 kg/m    Physical Exam   GEN: NAD, appears age, hoarse voice  Pulm: clear bilaterally  Throat: no trismus, uvula midline, no exudates, 2+ tonsils, mallampati III/IV  CV: RRR no m/r/g  Ears: bulging right TM with purulent fluid behind the ear drum, normal left TM  No results found for any visits on 09/30/22.            The use of Dragon[employer] dictation services may have been used to construct the content in this note; any grammatical or spelling errors are non-intentional. Please contact the author of this note directly if you are in need of any clarification.   "

## 2022-10-18 DIAGNOSIS — F51.04 CHRONIC INSOMNIA: ICD-10-CM

## 2022-10-18 RX ORDER — TRAZODONE HYDROCHLORIDE 50 MG/1
TABLET, FILM COATED ORAL
Qty: 30 TABLET | Refills: 4 | Status: SHIPPED | OUTPATIENT
Start: 2022-10-18 | End: 2023-03-21

## 2022-10-25 ENCOUNTER — HOSPITAL ENCOUNTER (EMERGENCY)
Facility: CLINIC | Age: 27
Discharge: HOME | End: 2022-10-25
Attending: NURSE PRACTITIONER | Admitting: NURSE PRACTITIONER
Payer: COMMERCIAL

## 2022-10-25 VITALS
OXYGEN SATURATION: 100 % | DIASTOLIC BLOOD PRESSURE: 76 MMHG | RESPIRATION RATE: 20 BRPM | SYSTOLIC BLOOD PRESSURE: 130 MMHG | TEMPERATURE: 96.8 F | HEART RATE: 84 BPM

## 2022-10-25 DIAGNOSIS — J02.9 VIRAL PHARYNGITIS: ICD-10-CM

## 2022-10-25 LAB
DEPRECATED S PYO AG THROAT QL EIA: NEGATIVE
FLUAV RNA SPEC QL NAA+PROBE: NEGATIVE
FLUBV RNA RESP QL NAA+PROBE: NEGATIVE
GROUP A STREP BY PCR: NOT DETECTED
SARS-COV-2 RNA RESP QL NAA+PROBE: NEGATIVE

## 2022-10-25 PROCEDURE — 87651 STREP A DNA AMP PROBE: CPT | Performed by: NURSE PRACTITIONER

## 2022-10-25 PROCEDURE — 87636 SARSCOV2 & INF A&B AMP PRB: CPT | Performed by: NURSE PRACTITIONER

## 2022-10-25 PROCEDURE — G0463 HOSPITAL OUTPT CLINIC VISIT: HCPCS | Mod: CS | Performed by: NURSE PRACTITIONER

## 2022-10-25 PROCEDURE — C9803 HOPD COVID-19 SPEC COLLECT: HCPCS | Performed by: NURSE PRACTITIONER

## 2022-10-25 PROCEDURE — 99213 OFFICE O/P EST LOW 20 MIN: CPT | Mod: CS | Performed by: NURSE PRACTITIONER

## 2022-10-25 ASSESSMENT — ENCOUNTER SYMPTOMS
ABDOMINAL PAIN: 0
HEADACHES: 0
FEVER: 0
FATIGUE: 0
EYE REDNESS: 0
LIGHT-HEADEDNESS: 0
RHINORRHEA: 0
EYE DISCHARGE: 0
CHILLS: 0
ARTHRALGIAS: 0
COUGH: 1
SINUS PAIN: 0
SINUS PRESSURE: 0
DIZZINESS: 0
MYALGIAS: 0
JOINT SWELLING: 0
NAUSEA: 0
VOMITING: 0
TROUBLE SWALLOWING: 0
WEAKNESS: 0
SORE THROAT: 1
NUMBNESS: 0
SHORTNESS OF BREATH: 0

## 2022-10-25 ASSESSMENT — ACTIVITIES OF DAILY LIVING (ADL): ADLS_ACUITY_SCORE: 35

## 2022-10-25 NOTE — ED PROVIDER NOTES
History     Chief Complaint   Patient presents with     Pharyngitis     HPI  Blanche Lyman is a 27 year old female who presents to the urgent care for evaluation of cough and sore throat since this morning.  Patient works at a  with RSV and strep cases.  Not using any over-the-counter medications. Denies fever, headache, dizziness, congestion, shortness of breath, chest pain, abdominal pain, nausea, vomiting, diarrhea, dysuria, hematuria and rash.      Allergies:  Allergies   Allergen Reactions     Dairy Products [Milk Protein Extract] Other (See Comments)     Headaches, stomach aches       Peanuts [Nuts] Other (See Comments)     Headaches, stomach aches       Wheat Gluten [Gluten Meal] Other (See Comments)     Headaches, stomach aches         Problem List:    Patient Active Problem List    Diagnosis Date Noted     Major depression, recurrent (H) 02/23/2022     Priority: Medium     Morbid obesity (H) 02/23/2022     Priority: Medium        Past Medical History:    Past Medical History:   Diagnosis Date     Depressive disorder 2013       Past Surgical History:    No past surgical history on file.    Family History:    Family History   Problem Relation Age of Onset     Hypertension Father      Heart Disease Maternal Grandfather      Cancer Paternal Grandfather      Heart Disease Paternal Grandfather      Hypertension Paternal Grandfather      Hyperlipidemia Paternal Grandfather        Social History:  Marital Status:  Single [1]  Social History     Tobacco Use     Smoking status: Never     Smokeless tobacco: Never   Vaping Use     Vaping Use: Some days     Substances: Nicotine     Devices: Disposable     Passive vaping exposure: Yes   Substance Use Topics     Alcohol use: Yes     Comment: Maybe 2-3 drinks a week.     Drug use: Never        Medications:    desogestrel-ethinyl estradiol (APRI) 0.15-30 MG-MCG tablet  hydrOXYzine (ATARAX) 25 MG tablet  traZODone (DESYREL) 50 MG tablet  venlafaxine (EFFEXOR XR)  75 MG 24 hr capsule  dexamethasone (DECADRON) 4 MG tablet  famotidine (PEPCID) 40 MG tablet  plecanatide (TRULANCE) 3 MG tablet  polyethylene glycol (MIRALAX) 17 GM/Dose powder  senna-docusate (SENOKOT-S/PERICOLACE) 8.6-50 MG tablet  venlafaxine (EFFEXOR-ER) 75 MG 24 hr tablet          Review of Systems   Constitutional: Negative for chills, fatigue and fever.   HENT: Positive for sore throat. Negative for congestion, ear discharge, ear pain, rhinorrhea, sinus pressure, sinus pain and trouble swallowing.    Eyes: Negative for discharge and redness.   Respiratory: Positive for cough. Negative for shortness of breath.    Cardiovascular: Negative for chest pain.   Gastrointestinal: Negative for abdominal pain, nausea and vomiting.   Musculoskeletal: Negative for arthralgias, joint swelling and myalgias.   Skin: Negative for rash.   Neurological: Negative for dizziness, weakness, light-headedness, numbness and headaches.   All other systems reviewed and are negative.      Physical Exam   BP: 130/76  Pulse: 84  Temp: 96.8  F (36  C)  Resp: 20  SpO2: 100 %      Physical Exam  Constitutional:       General: She is not in acute distress.     Appearance: She is well-developed. She is not diaphoretic.   HENT:      Mouth/Throat:      Pharynx: Posterior oropharyngeal erythema present.      Tonsils: No tonsillar exudate. 1+ on the right. 1+ on the left.   Eyes:      Conjunctiva/sclera: Conjunctivae normal.      Pupils: Pupils are equal, round, and reactive to light.   Cardiovascular:      Rate and Rhythm: Normal rate and regular rhythm.      Pulses: Normal pulses.   Pulmonary:      Effort: Pulmonary effort is normal. No respiratory distress.      Breath sounds: Normal breath sounds and air entry. No decreased air movement. No decreased breath sounds, wheezing or rhonchi.   Musculoskeletal:         General: Normal range of motion.      Cervical back: Normal range of motion and neck supple.   Skin:     General: Skin is warm.       Capillary Refill: Capillary refill takes less than 2 seconds.   Neurological:      General: No focal deficit present.      Mental Status: She is alert and oriented to person, place, and time.   Psychiatric:         Mood and Affect: Mood normal.         ED Course                 Procedures       Results for orders placed or performed during the hospital encounter of 10/25/22 (from the past 24 hour(s))   Streptococcus A Rapid Scr w Reflx to PCR    Specimen: Throat; Swab   Result Value Ref Range    Group A Strep antigen Negative Negative   Symptomatic; Yes; 10/25/2022 Influenza A/B & SARS-CoV2 (COVID-19) Virus PCR Multiplex Nasopharyngeal    Specimen: Nasopharyngeal; Swab   Result Value Ref Range    Influenza A PCR Negative Negative    Influenza B PCR Negative Negative    SARS CoV2 PCR Negative Negative    Narrative    Testing was performed using the breanne SARS-CoV-2 & Influenza A/B Assay on the breanne [employer] System. This test should be ordered for the detection of SARS-CoV-2 and influenza viruses in individuals who meet clinical and/or epidemiological criteria. Test performance is unknown in asymptomatic patients. This test is for in vitro diagnostic use under the FDA EUA for laboratories certified under CLIA to perform moderate and/or high complexity testing. This test has not been FDA cleared or approved. A negative result does not rule out the presence of PCR inhibitors in the specimen or target RNA in concentration below the limit of detection for the assay. If only one viral target is positive but coinfection with multiple targets is suspected, the sample should be re-tested with another FDA cleared, approved or authorized test, if coinfection would change clinical management. [facility] are certified under the Clinical Laboratory Improvement Amendments of 1988 (CLIA-88) as qualified to perform moderate and/or high complexity laboratory testing.       Medications - No data to  display    Assessments & Plan (with Medical Decision Making)   Blanche Lyman is a 27 year old female who presents to the urgent care for evaluation of cough and sore throat since this morning. Vital signs normal, no worrisome findings on physical exam. Covid and influenza negative. Rapid strep negative, culture pending. Discussed likely viral etiology of symptoms and average course of viral illness. May use over the counter medications as needed and appropriate. Increase rest and hydration. Return precautions reviewed, all questions answered. Patient agreeable to plan of care and discharged in good condition.     I have reviewed the nursing notes.    I have reviewed the findings, diagnosis, plan and need for follow up with the patient.      Discharge Medication List as of 10/25/2022  5:43 PM          Final diagnoses:   Viral pharyngitis       10/25/2022   [facility] EMERGENCY DEPT     Nadia Munoz, APRN CNP  10/25/22 1818

## 2022-10-25 NOTE — ED TRIAGE NOTES
Pt reports sore throat and a cough since this morning, works in a  where there have been confirmed cases of RSV and strep.

## 2022-10-30 ENCOUNTER — E-VISIT (OUTPATIENT)
Dept: FAMILY MEDICINE | Facility: CLINIC | Age: 27
End: 2022-10-30
Payer: COMMERCIAL

## 2022-10-30 DIAGNOSIS — R43.0 LOSS OF SMELL: ICD-10-CM

## 2022-10-30 DIAGNOSIS — R05.1 ACUTE COUGH: Primary | ICD-10-CM

## 2022-10-30 DIAGNOSIS — J01.90 ACUTE BACTERIAL SINUSITIS: ICD-10-CM

## 2022-10-30 DIAGNOSIS — B96.89 ACUTE BACTERIAL SINUSITIS: ICD-10-CM

## 2022-10-30 PROCEDURE — 99422 OL DIG E/M SVC 11-20 MIN: CPT | Performed by: NURSE PRACTITIONER

## 2022-11-01 RX ORDER — BENZONATATE 100 MG/1
100 CAPSULE ORAL 3 TIMES DAILY PRN
Qty: 30 CAPSULE | Refills: 0 | Status: SHIPPED | OUTPATIENT
Start: 2022-11-01 | End: 2023-04-14

## 2022-11-01 NOTE — TELEPHONE ENCOUNTER
Provider E-Visit time total (minutes): 11  Phone call placed to patient for clarification of symptoms  No answer.. message left  Consider COVID testing  Treat for acute sinusitis  Lilliana Lubin MSN,FNP-BC  [address] [address] [phone]

## 2022-12-30 NOTE — PATIENT INSTRUCTIONS
You may want to try a nasal lavage (also known as nasal irrigation). You can find over-the-counter products, such as Neti-Pot, at retail locations or make your own at home. Instructions for homemade nasal lavage and more information on the process are available online at http://[email or web address]/[email or web address].      When to Use Antibiotics    Antibiotics are medicines used to treat infections caused by bacteria. They don t work for an illness caused by a virus. And they don't work for an allergic reaction. In fact, taking antibiotics for reasons other than an infection by bacteria can cause problems. You may have side effects from the medicine. And if you need an antibiotic in the future, it may not work well. This is because the bacteria can become immune to the medicine. You can also get a type of diarrhea that's hard to treat. This diarrhea is called C. diff.   When antibiotics likely won t help  Your healthcare provider won t usually give you antibiotics for the conditions listed below. You can help by not asking for them if you have:   A cold. This type of illness is caused by a virus. It can cause a runny nose, stuffed-up nose, sneezing, coughing, and headache. You may also have mild body aches and low fever. A cold gets better on its own in a few days to a week.  The flu (influenza). This is a respiratory illness caused by a virus. The flu usually goes away on its own in a week or so. It can cause fever, body aches, sore throat, and tiredness.  Bronchitis. This is an infection in the lungs. It is most often caused by a virus. You may have coughing, phlegm, body aches, and a low fever. A common type of bronchitis is known as a chest cold. This is called acute bronchitis. This often happens after you have a respiratory infection like a cold. Bronchitis can take weeks to go away. Antibiotics often don t help.  Most sore throats. Sore throats are most often caused by viruses. Your throat may feel scratchy  Comprehensive Nutrition Assessment    Type and Reason for Visit:  Initial (BMI low for age)    Nutrition Recommendations/Plan:   Resume regular diet when appropriate  Offer standard oral nutrition supplement as diet advances  Will continue to follow up during stay      Malnutrition Assessment:  Malnutrition Status: At risk for malnutrition (Comment) (12/30/22 1152)    Context:  Chronic Illness       Nutrition Assessment:    Admit with hx fall on ice, fracture of patella. NPO today. Usually on regular diet, eats brunch and supper every day. Willing to drink oral nutrition supplement during stay. Usual weight 105#, patient does report gain and hx this year indicates gain also. BMI low for age. Will follow at moderate nutrition risk with increased needs. Nutrition Related Findings:    resting in bed, hx CLL on chemo pill,  COPD    Hb 9 Wound Type: None       Current Nutrition Intake & Therapies:    Average Meal Intake: NPO  Average Supplements Intake: NPO  Diet NPO    Anthropometric Measures:  Height: 5' 1\" (154.9 cm)  Ideal Body Weight (IBW): 105 lbs (48 kg)       Current Body Weight: 116 lb 2.9 oz (52.7 kg), 110.6 % IBW.  Weight Source: Bed Scale  Current BMI (kg/m2): 22  Usual Body Weight: 105 lb (47.6 kg) (hx 106# May, 109# August, 114# November)  % Weight Change (Calculated): 10.6  Weight Adjustment For: No Adjustment                 BMI Categories: Underweight (BMI less than 22) age over 72    Estimated Daily Nutrient Needs:  Energy Requirements Based On: Kcal/kg  Weight Used for Energy Requirements: Current  Energy (kcal/day): 4604-4071 (30-35 leon/kg)  Weight Used for Protein Requirements: Current  Protein (g/day): 53-64 (1-1.2 g/kg)  Method Used for Fluid Requirements: 1 ml/kcal  Fluid (ml/day): 1600    Nutrition Diagnosis:   Increased nutrient needs related to acute injury/trauma as evidenced by other (comment) (healing fracture)    Nutrition Interventions:   Food and/or Nutrient Delivery: Start Oral Diet, Start Oral Nutrition Supplement  Nutrition Education/Counseling: No recommendation at this time  Coordination of Nutrition Care: Continue to monitor while inpatient  Plan of Care discussed with: patient    Goals:     Goals: Initiate PO diet, within 2 days       Nutrition Monitoring and Evaluation:   Behavioral-Environmental Outcomes: None Identified  Food/Nutrient Intake Outcomes: Diet Advancement/Tolerance, Food and Nutrient Intake  Physical Signs/Symptoms Outcomes: Biochemical Data, Meal Time Behavior, Skin, Weight    Discharge Planning:    Continue Oral Nutrition Supplement     Huey Cortez, [address], [place]  Contact: [phone] or achy. It may hurt to swallow. You may also have a low fever and body aches. A sore throat usually gets better in a few days.  Most outer ear infections. An ear infection may be caused by a virus or bacteria. It causes pain in the ear. Antibiotics by mouth usually don t help. Low-dose antibiotic ear drops work much better.  Some inner ear infections. An inner ear infection (otitis media) can be caused by a virus in the ear. It can also cause pain and a high fever. Most older children with low-grade fever don't need to be treated with antibiotics.  Most sinus infections. This is also known as sinusitis. This kind of infection causes sinus pain and swelling, and a runny nose. In most cases, it goes away on its own. Antibiotics don t make recovery quicker.  Allergic rhinitis. This is a set of symptoms caused by an allergic reaction. You may have sneezing, a runny nose, itchy or watery eyes, or a sore throat. Allergies are not treated with antibiotics.  Low fever. A mild fever that s less than 100.4 F (38 C) most likely doesn t need to be treated with antibiotics.   When antibiotics can help  Antibiotics can be used to treat:                                                     Strep throat. This is a throat infection caused by a certain type of bacteria. Symptoms of strep throat include a sore throat, white patches on the tonsils, red spots on the roof of the mouth, fever, body aches, and nausea and vomiting. Strep throat almost never causes a cough.  Urinary tract infection (UTI). This is an infection of the bladder and the tube that takes urine out of the body. It is caused by bacteria. It can cause burning pain and urine that s cloudy or tinted with blood. UTIs are very common. Antibiotics usually help treat them.  Some outer ear infections. In some cases, a healthcare provider may prescribe antibiotics by mouth for an ear infection. You may need a test to show the cause of the ear infection.  Some sinus  infections. In some cases, your healthcare provider may give you antibiotics. He or she may first need to make sure your symptoms aren t caused by something else. This may be a virus, fungus, allergies, or air pollutants such as smoke.   Your healthcare provider may give you antibiotics if you have a condition that can affect your immune system. This includes diabetes or cancer.  Self-care at home  If your infection can t be treated with antibiotics, you can take other steps to feel better. Try the remedies below. In general:   Rest and sleep as much as needed.  Drink water and other clear fluids.  Don t smoke. Stay away from smoke from other people.  Use over-the-counter medicine such as acetaminophen or ibuprofen to ease pain or fever, as directed by your healthcare provider.  To treat sinus pain or nasal stuffiness:  Put a warm, moist cloth on your face where you feel sinus pain or pressure.  Try a nasal spray with medicine or saline. Use as directed by your healthcare provider.  Breathe in steam from a hot shower.  Use a humidifier or cool mist vaporizer.   To quiet a cough:   Use a humidifier or cool mist vaporizer.  Breathe in steam from a hot shower.  Suck on cough lozenges.   To sooth a sore throat:   Suck on ice chips, frozen ice pops, or lozenges.  Use a sore throat spray.  Use a humidifier or cool mist vaporizer.  Gargle with saltwater.  Drink warm liquids.  Take ibuprofen to reduce swelling and pain.  To ease ear pain:   Hold a warm, moist washcloth on the ear for 10 minutes at a time.  [employer] last reviewed this educational content on 12/1/2019 2000-2021 [employer]. All rights reserved. This information is not intended as a substitute for professional medical care. Always follow your healthcare professional's instructions.          Sinusitis (Antibiotic Treatment)    The sinuses are air-filled spaces within the bones of the face. They connect to the inside of the nose. Sinusitis is an  inflammation of the tissue that lines the sinuses. Sinusitis can occur during a cold. It can also happen due to allergies to pollens and other particles in the air. Sinusitis can cause symptoms of sinus congestion and a feeling of fullness. A sinus infection causes fever, headache, and facial pain. There is often green or yellow fluid draining from the nose or into the back of the throat (post-nasal drip). You have been given antibiotics to treat this condition.   Home care  Take the full course of antibiotics as instructed. Don't stop taking them, even when you feel better.  Drink plenty of water, hot tea, and other liquids as directed by the healthcare provider. This may help thin nasal mucus. It also may help your sinuses drain fluids.  Heat may help soothe painful areas of your face. Use a towel soaked in hot water. Or,  the shower and direct the warm spray onto your face. Using a vaporizer along with a menthol rub at night may also help soothe symptoms.   An expectorant with guaifenesin may help thin nasal mucus and help your sinuses drain fluids. Talk with your provider or pharmacists before taking an over-the-counter (OTC) medicine if you have any questions about it or its side effects..  You can use an OTC decongestant, unless a similar medicine was prescribed to you. Nasal sprays work the fastest. Use one that contains phenylephrine or oxymetazoline. First blow your nose gently. Then use the spray. Don't use these medicines more often than directed on the label. If you do, your symptoms may get worse. You may also take pills that contain pseudoephedrine. Don t use products that combine multiple medicines. This is because side effects may be increased. Read labels. You can also ask the pharmacist for help. (People with high blood pressure should not use decongestants. They can raise blood pressure.) Talk with your provider or pharmacist if you have any questions about the medicine..  OTC  antihistamines may help if allergies contributed to your sinusitis. Talk with your provider or pharmacist if you have any questions about the medicine..  Don't use nasal rinses or irrigation during an acute sinus infection, unless your healthcare provider tells you to. Rinsing may spread the infection to other areas in your sinuses.  Use acetaminophen or ibuprofen to control pain, unless another pain medicine was prescribed to you. If you have chronic liver or kidney disease or ever had a stomach ulcer, talk with your healthcare provider before using these medicines. Never give aspirin to anyone under age 18 who is ill with a fever. It may cause severe liver damage.  Don't smoke. This can make symptoms worse.    Follow-up care  Follow up with your healthcare provider, or as advised.   When to seek medical advice  Call your healthcare provider if any of these occur:   Facial pain or headache that gets worse  Stiff neck  Unusual drowsiness or confusion  Swelling of your forehead or eyelids  Symptoms don't go away in 10 days  Vision problems, such as blurred or double vision  Fever of 100.4 F (38 C) or higher, or as directed by your healthcare provider  Call 911  Call 911 if any of these occur:   Seizure  Trouble breathing  Feeling dizzy or faint  Fingernails, skin or lips look blue, purple , or gray  Prevention  Here are steps you can take to help prevent an infection:   Keep good hand washing habits.  Don t have close contact with people who have sore throats, colds, or other upper respiratory infections.  Don t smoke, and stay away from secondhand smoke.  Stay up to date with of your vaccines.  [employer] last reviewed this educational content on 12/1/2019 2000-2021 [employer]. All rights reserved. This information is not intended as a substitute for professional medical care. Always follow your healthcare professional's instructions.        Dear Blanche Lyman    After reviewing your responses,  I've been able to diagnose you with? Sinus infection .?   Consider doing a COVID test if you have not done so already      Based on your responses and diagnosis, I have prescribed Augmentin to cover for bacterial infection and Tessalon for cough to treat your symptoms. I have sent this to your pharmacy.?     It is also important to stay well hydrated, get lots of rest and take over-the-counter decongestants,?tylenol?or ibuprofen if you?are able to?take those medications per your primary care provider to help relieve discomfort.?     It is important that you take?all of?your prescribed medication even if your symptoms are improving after a few doses.? Taking?all of?your medicine helps prevent the symptoms from returning.?     If your symptoms worsen, you develop severe headache, vomiting, high fever (>102), or are not improving in 7 days, please contact your primary care provider for an appointment or visit any of our convenient Walk-in Care or Urgent Care Centers to be seen which can be found on our website?here.?     Thanks again for choosing?us?as your health care partner,?   ?  Lilliana Lubin, MARIO CNP?

## 2023-02-07 DIAGNOSIS — Z30.41 SURVEILLANCE FOR BIRTH CONTROL, ORAL CONTRACEPTIVES: ICD-10-CM

## 2023-02-10 RX ORDER — DESOGESTREL AND ETHINYL ESTRADIOL 0.15-0.03
KIT ORAL
Qty: 84 TABLET | Refills: 3 | OUTPATIENT
Start: 2023-02-10

## 2023-02-20 ENCOUNTER — TELEPHONE (OUTPATIENT)
Dept: FAMILY MEDICINE | Facility: CLINIC | Age: 28
End: 2023-02-20
Payer: MEDICAID

## 2023-02-20 NOTE — TELEPHONE ENCOUNTER
The patient called us back and she has a sore throat and nausea and congestion, and would like a Strep test.  She is drinking fluids.  Covid negative this morning.  I advised an E Visit  Pt is not short of breath, no chest pain and no fever right now.

## 2023-02-20 NOTE — TELEPHONE ENCOUNTER
Reason for Call:  Cough, sore throat, body aches, throwing up, Headache & Fever    Detailed comments: Symptoms started Sat 1/18/23  Cough, sore Throat, body Aches, throwing up, headache and Fever.  Negative Covid test this am.  Pt is wondering if she should be seen.    Phone Number Patient can be reached at: Home number on file [phone] (home)    Best Time: Any Time      Can we leave a detailed message on this number? YES    Call taken on 2/20/2023 at 7:06 AM by Ashley Peres

## 2023-03-07 ENCOUNTER — TELEPHONE (OUTPATIENT)
Dept: FAMILY MEDICINE | Facility: CLINIC | Age: 28
End: 2023-03-07
Payer: MEDICAID

## 2023-03-07 DIAGNOSIS — Z30.41 SURVEILLANCE FOR BIRTH CONTROL, ORAL CONTRACEPTIVES: ICD-10-CM

## 2023-03-07 RX ORDER — DESOGESTREL AND ETHINYL ESTRADIOL 0.15-0.03
1 KIT ORAL DAILY
Qty: 84 TABLET | Refills: 0 | Status: SHIPPED | OUTPATIENT
Start: 2023-03-07 | End: 2023-05-10

## 2023-03-07 NOTE — TELEPHONE ENCOUNTER
Birthcontrol Refill     Contacts       Type Contact Phone/Fax    03/07/2023 09:54 AM CST Phone (Incoming) LymanBlanche (Self) [phone] (M)        Reason for Call:  Requesting Birth Control - which is not on current med list.  Pt did not know the name of it.   Pt has been taking the Birth control for years. Pt is out at this time. Please Advise.     Date of last appointment with provider: 7/20/22 Lilliana Lubin NP      Could we send this information to you in [facility] or would you prefer to receive a phone call?:   Patient would prefer a phone call   Okay to leave a detailed message?: Yes at Home number on file [phone] (home)

## 2023-03-07 NOTE — TELEPHONE ENCOUNTER
Patient called. Notified of Refill. Patient also notified that she is due for a yearly exam as well.

## 2023-03-21 DIAGNOSIS — F51.04 CHRONIC INSOMNIA: ICD-10-CM

## 2023-03-21 RX ORDER — TRAZODONE HYDROCHLORIDE 50 MG/1
TABLET, FILM COATED ORAL
Qty: 90 TABLET | Refills: 1 | Status: SHIPPED | OUTPATIENT
Start: 2023-03-21 | End: 2023-08-21

## 2023-03-24 ENCOUNTER — HOSPITAL ENCOUNTER (EMERGENCY)
Facility: CLINIC | Age: 28
Discharge: HOME | End: 2023-03-24
Attending: PHYSICIAN ASSISTANT | Admitting: PHYSICIAN ASSISTANT
Payer: COMMERCIAL

## 2023-03-24 VITALS
HEART RATE: 81 BPM | DIASTOLIC BLOOD PRESSURE: 109 MMHG | OXYGEN SATURATION: 100 % | TEMPERATURE: 98.6 F | SYSTOLIC BLOOD PRESSURE: 172 MMHG | RESPIRATION RATE: 18 BRPM

## 2023-03-24 DIAGNOSIS — U07.1 INFECTION DUE TO 2019 NOVEL CORONAVIRUS: ICD-10-CM

## 2023-03-24 LAB — SARS-COV-2 RNA RESP QL NAA+PROBE: POSITIVE

## 2023-03-24 PROCEDURE — C9803 HOPD COVID-19 SPEC COLLECT: HCPCS | Performed by: PHYSICIAN ASSISTANT

## 2023-03-24 PROCEDURE — G0463 HOSPITAL OUTPT CLINIC VISIT: HCPCS | Mod: CS | Performed by: PHYSICIAN ASSISTANT

## 2023-03-24 PROCEDURE — U0003 INFECTIOUS AGENT DETECTION BY NUCLEIC ACID (DNA OR RNA); SEVERE ACUTE RESPIRATORY SYNDROME CORONAVIRUS 2 (SARS-COV-2) (CORONAVIRUS DISEASE [COVID-19]), AMPLIFIED PROBE TECHNIQUE, MAKING USE OF HIGH THROUGHPUT TECHNOLOGIES AS DESCRIBED BY CMS-2020-01-R: HCPCS | Performed by: PHYSICIAN ASSISTANT

## 2023-03-24 PROCEDURE — 99214 OFFICE O/P EST MOD 30 MIN: CPT | Mod: CS | Performed by: PHYSICIAN ASSISTANT

## 2023-03-24 ASSESSMENT — ACTIVITIES OF DAILY LIVING (ADL): ADLS_ACUITY_SCORE: 35

## 2023-03-24 ASSESSMENT — ENCOUNTER SYMPTOMS
RHINORRHEA: 1
RESPIRATORY NEGATIVE: 1
CARDIOVASCULAR NEGATIVE: 1
SORE THROAT: 1
ACTIVITY CHANGE: 0
APPETITE CHANGE: 0
FEVER: 1
GASTROINTESTINAL NEGATIVE: 1
HEADACHES: 1

## 2023-03-24 NOTE — ED TRIAGE NOTES
Pt arrives with headaches, sore throat and congestion. Mild fever at home, tested positive for covid using a home test.   Works in a  where a client of 6 months is positive for covid.

## 2023-03-25 ENCOUNTER — PATIENT OUTREACH (OUTPATIENT)
Dept: CARE COORDINATION | Facility: CLINIC | Age: 28
End: 2023-03-25
Payer: MEDICAID

## 2023-03-25 NOTE — ED PROVIDER NOTES
History   No chief complaint on file.    HPI  Blanche Lyman is a 28 year old female with a past medical history of depression who presents for evaluation of URI symptoms which began over the past 24 hours.  Current symptoms include headaches, sore throat and congestion, mild fevers.  She tested positive for COVID-19 at home.  Has been taking over-the-counter medications such as ibuprofen and Tylenol with limited relief.  She works at a  and has been exposed to COVID-19.  She denies any chest pain or shortness of breath currently no concerns of breathing or swallowing..  Headache is felt behind her eyes, is dull in character.  No acute vision changes or numbness or tingling or weakness.  Has no abdominal pain, nausea or vomiting.  Eating and drinking normally, normal bowel and bladder function.  This is the third time she has had COVID-19, treated  with Paxlovid in the past with good relief of symptoms.    Allergies:  Allergies   Allergen Reactions     Dairy Products [Milk Protein Extract] Other (See Comments)     Headaches, stomach aches       Peanuts [Nuts] Other (See Comments)     Headaches, stomach aches       Wheat Gluten [Gluten Meal] Other (See Comments)     Headaches, stomach aches         Problem List:    Patient Active Problem List    Diagnosis Date Noted     Major depression, recurrent (H) 02/23/2022     Priority: Medium     Morbid obesity (H) 02/23/2022     Priority: Medium        Past Medical History:    Past Medical History:   Diagnosis Date     Depressive disorder 2013       Past Surgical History:    No past surgical history on file.    Family History:    Family History   Problem Relation Age of Onset     Hypertension Father      Heart Disease Maternal Grandfather      Cancer Paternal Grandfather      Heart Disease Paternal Grandfather      Hypertension Paternal Grandfather      Hyperlipidemia Paternal Grandfather        Social History:  Marital Status:  Single [1]  Social History      Tobacco Use     Smoking status: Never     Smokeless tobacco: Never   Vaping Use     Vaping Use: Some days     Substances: Nicotine     Devices: Disposable     Passive vaping exposure: Yes   Substance Use Topics     Alcohol use: Yes     Comment: Maybe 2-3 drinks a week.     Drug use: Never        Medications:    nirmatrelvir and ritonavir (PAXLOVID) 300 mg/100 mg therapy pack  benzonatate (TESSALON) 100 MG capsule  desogestrel-ethinyl estradiol (APRI) 0.15-30 MG-MCG tablet  hydrOXYzine (ATARAX) 25 MG tablet  polyethylene glycol (MIRALAX) 17 GM/Dose powder  senna-docusate (SENOKOT-S/PERICOLACE) 8.6-50 MG tablet  traZODone (DESYREL) 50 MG tablet  venlafaxine (EFFEXOR XR) 75 MG 24 hr capsule  venlafaxine (EFFEXOR-ER) 75 MG 24 hr tablet          Review of Systems   Constitutional: Positive for fever. Negative for activity change and appetite change.   HENT: Positive for congestion, rhinorrhea and sore throat.    Respiratory: Negative.    Cardiovascular: Negative.    Gastrointestinal: Negative.    Genitourinary: Negative.    Neurological: Positive for headaches.       Physical Exam   BP: (!) 172/109  Pulse: 81  Temp: 98.6  F (37  C)  Resp: 18  SpO2: 100 %      Physical Exam  Constitutional:       General: She is not in acute distress.     Appearance: Normal appearance. She is not ill-appearing, toxic-appearing or diaphoretic.   HENT:      Head: Normocephalic and atraumatic.      Right Ear: Tympanic membrane, ear canal and external ear normal. No middle ear effusion. There is no impacted cerumen. No mastoid tenderness. Tympanic membrane is not injected, perforated, erythematous, retracted or bulging.      Left Ear: Tympanic membrane, ear canal and external ear normal.  No middle ear effusion. There is no impacted cerumen. No mastoid tenderness. Tympanic membrane is not injected, perforated, erythematous, retracted or bulging.      Nose: Nose normal. No congestion or rhinorrhea.      Mouth/Throat:      Mouth: Mucous  membranes are moist.      Pharynx: Oropharynx is clear. No oropharyngeal exudate or posterior oropharyngeal erythema.   Cardiovascular:      Rate and Rhythm: Normal rate and regular rhythm.      Pulses: Normal pulses.      Heart sounds: Normal heart sounds. No murmur heard.  Pulmonary:      Effort: Pulmonary effort is normal. No respiratory distress.      Breath sounds: Normal breath sounds. No stridor. No wheezing or rhonchi.   Musculoskeletal:      Cervical back: Neck supple. No rigidity or tenderness. No muscular tenderness.   Lymphadenopathy:      Cervical: No cervical adenopathy.      Right cervical: No superficial, deep or posterior cervical adenopathy.     Left cervical: No superficial, deep or posterior cervical adenopathy.   Neurological:      Mental Status: She is alert and oriented to person, place, and time.      Sensory: No sensory deficit.         ED Course                 Procedures                  Results for orders placed or performed during the hospital encounter of 03/24/23 (from the past 24 hour(s))   Symptomatic COVID-19 Virus (Coronavirus) by PCR Nasopharyngeal    Specimen: Nasopharyngeal; Swab   Result Value Ref Range    SARS CoV2 PCR Positive (A) Negative    Narrative    Testing was performed using the Xpert Xpress SARS-CoV-2 Assay on the Cepheid Gene-Xpert Instrument Systems. Additional information about this Emergency Use Authorization (EUA) assay can be found via the Lab Guide. This test should be ordered for the detection of SARS-CoV-2 in individuals who meet SARS-CoV-2 clinical and/or epidemiological criteria as well as from individuals without symptoms or other reasons to suspect COVID-19. Test performance for asymptomatic patients has only been established in anterior nasal swab specimens. This test is for in vitro diagnostic use under the FDA EUA for laboratories certified under CLIA to perform high complexity testing. This test has not been FDA cleared or approved. A negative result  does not rule out the presence of PCR inhibitors in the specimen or target RNA concentration below the limit of detection for the assay. The possibility of a false negative should be considered if the patient's recent exposure or clinical presentation suggests COVID-19. This test was validated by the [facility]. This laboratory is certified under the Clinical Laboratory Improvement Amendments (CLIA) as qualified to perform high complexity laboratory testing.         Medications - No data to display    Assessments & Plan (with Medical Decision Making)   The patient tested positive for COVID-19 today. I provided her with a referral to the COVID-19 Get Well Loop.  She appears well, has no shortness of breath, excessive fatigue, high fevers or difficulties with breathing.  Normal oxygen saturation.  Return to clinic if oxygen saturation is persistently 90% or less with or without activity.      Prescribed Paxlovid per the patient's request.    Recommend isolating for 5 days since symptom onset at a minimum, isolate longer if symptoms persist beyond 5 days, then wait for 24 additional hours after your fever and all other symptoms completely resolve (no use of pain, fever, or other OTC medications for acute symptom relief) before going out in public.  Day 0 is considered the start of symptoms.  May discontinue isolation after day 5 if symptoms improve, wear a mask while in public until day 10 from the start of symptoms.  If symptoms reasonably improved after 10 days, may discontinue wearing the mask.     Return to clinic for further evaluation if you develop fever of 103 degrees F or greater unresponsive to fever reducing medication, chest pain, difficulty breathing, palpitations, shortness of breath, difficulty swallowing, severe headache, worsening numbness or tingling or weakness, dizziness or lightheadedness, acute vision changes, acutely worsening rash, severe abdominal pain, or  uncontrolled nausea/vomiting.      I have reviewed the nursing notes.    I have reviewed the findings, diagnosis, plan and need for follow up with the patient.  Stable at the time of discharge.    New Prescriptions    NIRMATRELVIR AND RITONAVIR (PAXLOVID) 300 MG/100 MG THERAPY PACK    Take 3 tablets by mouth 2 times daily for 5 days Take 2 Nirmatrelvir tablets and 1 Ritonavir tablet twice daily for 5 days.       Final diagnoses:   Infection due to 2019 novel coronavirus       3/24/2023   [facility] EMERGENCY DEPT     Shravan Taylor PA-C  03/27/23 1291

## 2023-03-25 NOTE — DISCHARGE INSTRUCTIONS
Recommend isolating for 5 days since symptom onset at a minimum, isolate longer if symptoms persist beyond 5 days, then wait for 24 additional hours after your fever and all other symptoms completely resolve (no use of pain, fever, or other OTC medications for acute symptom relief) before going out in public.  Day 0 is considered the start of symptoms.  May discontinue isolation after day 5 if symptoms improve, wear a mask while in public until day 10 from the start of symptoms.  If symptoms reasonably improved after 10 days, may discontinue wearing the mask.     Return to clinic for further evaluation if you develop fever of 103 degrees F or greater unresponsive to fever reducing medication, chest pain, difficulty breathing, palpitations, shortness of breath, difficulty swallowing, severe headache, worsening numbness or tingling or weakness, dizziness or lightheadedness, acute vision changes, acutely worsening rash, severe abdominal pain, or uncontrolled nausea/vomiting.

## 2023-03-25 NOTE — PROGRESS NOTES
Clinic Care Coordination Contact    Referral Type: Virtual Home Monitoring - Epic Care Companion    Patient referred for Virtual Home Monitoring Program for either COVID-19 or Community Acquired Pneumonia diagnosis following recent [facility] ED visit.  Epic Care Companion referral was also placed by provider.    Criteria for Virtual Home Monitoring telephone outreach is not met after review of ED encounter/ED provider note because:    1) ED provider note indicates assessment was negative for respiratory distress. O2 sats were stable throughout course of ED visit per chart review.      2) Patient was not discharged with new supplemental oxygen.    Per notes, ED provider and/or ED care team discussed appropriate follow up guidelines with patient prior to discharge or reflected these instructions on AVS.       Care Companion team remains available to support patient via [facility] account once activated by patient.     MARIAMA Soares  - RN Care Coordinator

## 2023-04-03 ENCOUNTER — APPOINTMENT (OUTPATIENT)
Dept: GENERAL RADIOLOGY | Facility: CLINIC | Age: 28
End: 2023-04-03
Attending: FAMILY MEDICINE
Payer: COMMERCIAL

## 2023-04-03 ENCOUNTER — TELEPHONE (OUTPATIENT)
Dept: FAMILY MEDICINE | Facility: CLINIC | Age: 28
End: 2023-04-03
Payer: MEDICAID

## 2023-04-03 ENCOUNTER — HOSPITAL ENCOUNTER (EMERGENCY)
Facility: CLINIC | Age: 28
Discharge: HOME | End: 2023-04-03
Attending: FAMILY MEDICINE | Admitting: FAMILY MEDICINE
Payer: COMMERCIAL

## 2023-04-03 VITALS
RESPIRATION RATE: 30 BRPM | HEIGHT: 67 IN | HEART RATE: 104 BPM | OXYGEN SATURATION: 100 % | TEMPERATURE: 97.7 F | SYSTOLIC BLOOD PRESSURE: 173 MMHG | BODY MASS INDEX: 37.67 KG/M2 | WEIGHT: 240 LBS | DIASTOLIC BLOOD PRESSURE: 103 MMHG

## 2023-04-03 DIAGNOSIS — R07.89 ATYPICAL CHEST PAIN: ICD-10-CM

## 2023-04-03 LAB
ALBUMIN SERPL BCG-MCNC: 3.9 G/DL (ref 3.5–5.2)
ALP SERPL-CCNC: 71 U/L (ref 35–104)
ALT SERPL W P-5'-P-CCNC: 24 U/L (ref 10–35)
ANION GAP SERPL CALCULATED.3IONS-SCNC: 10 MMOL/L (ref 7–15)
AST SERPL W P-5'-P-CCNC: 18 U/L (ref 10–35)
BASE EXCESS BLDV CALC-SCNC: 4.2 MMOL/L (ref -7.7–1.9)
BASOPHILS # BLD AUTO: 0 10E3/UL (ref 0–0.2)
BASOPHILS NFR BLD AUTO: 0 %
BILIRUB SERPL-MCNC: <0.2 MG/DL
BUN SERPL-MCNC: 11.4 MG/DL (ref 6–20)
CALCIUM SERPL-MCNC: 9.6 MG/DL (ref 8.6–10)
CHLORIDE SERPL-SCNC: 101 MMOL/L (ref 98–107)
CREAT SERPL-MCNC: 0.77 MG/DL (ref 0.51–0.95)
D DIMER PPP FEU-MCNC: 0.43 UG/ML FEU (ref 0–0.5)
DEPRECATED HCO3 PLAS-SCNC: 25 MMOL/L (ref 22–29)
EOSINOPHIL # BLD AUTO: 0 10E3/UL (ref 0–0.7)
EOSINOPHIL NFR BLD AUTO: 0 %
ERYTHROCYTE [DISTWIDTH] IN BLOOD BY AUTOMATED COUNT: 11.9 % (ref 10–15)
FLUAV RNA SPEC QL NAA+PROBE: NEGATIVE
FLUBV RNA RESP QL NAA+PROBE: NEGATIVE
GFR SERPL CREATININE-BSD FRML MDRD: >90 ML/MIN/1.73M2
GLUCOSE SERPL-MCNC: 83 MG/DL (ref 70–99)
HCO3 BLDV-SCNC: 31 MMOL/L (ref 21–28)
HCT VFR BLD AUTO: 42.6 % (ref 35–47)
HGB BLD-MCNC: 14.3 G/DL (ref 11.7–15.7)
IMM GRANULOCYTES # BLD: 0 10E3/UL
IMM GRANULOCYTES NFR BLD: 0 %
LYMPHOCYTES # BLD AUTO: 1.9 10E3/UL (ref 0.8–5.3)
LYMPHOCYTES NFR BLD AUTO: 22 %
MCH RBC QN AUTO: 30.8 PG (ref 26.5–33)
MCHC RBC AUTO-ENTMCNC: 33.6 G/DL (ref 31.5–36.5)
MCV RBC AUTO: 92 FL (ref 78–100)
MONOCYTES # BLD AUTO: 0.9 10E3/UL (ref 0–1.3)
MONOCYTES NFR BLD AUTO: 11 %
NEUTROPHILS # BLD AUTO: 5.8 10E3/UL (ref 1.6–8.3)
NEUTROPHILS NFR BLD AUTO: 67 %
NRBC # BLD AUTO: 0 10E3/UL
NRBC BLD AUTO-RTO: 0 /100
O2/TOTAL GAS SETTING VFR VENT: 0 %
PCO2 BLDV: 51 MM HG (ref 40–50)
PH BLDV: 7.39 [PH] (ref 7.32–7.43)
PLATELET # BLD AUTO: 351 10E3/UL (ref 150–450)
PO2 BLDV: 21 MM HG (ref 25–47)
POTASSIUM SERPL-SCNC: 4.4 MMOL/L (ref 3.4–5.3)
PROT SERPL-MCNC: 7 G/DL (ref 6.4–8.3)
RBC # BLD AUTO: 4.65 10E6/UL (ref 3.8–5.2)
RSV RNA SPEC NAA+PROBE: NEGATIVE
SARS-COV-2 RNA RESP QL NAA+PROBE: NEGATIVE
SODIUM SERPL-SCNC: 136 MMOL/L (ref 136–145)
TROPONIN T SERPL HS-MCNC: <6 NG/L
WBC # BLD AUTO: 8.6 10E3/UL (ref 4–11)

## 2023-04-03 PROCEDURE — 99285 EMERGENCY DEPT VISIT HI MDM: CPT | Mod: CS,25 | Performed by: FAMILY MEDICINE

## 2023-04-03 PROCEDURE — 80053 COMPREHEN METABOLIC PANEL: CPT | Performed by: FAMILY MEDICINE

## 2023-04-03 PROCEDURE — 258N000003 HC RX IP 258 OP 636: Performed by: FAMILY MEDICINE

## 2023-04-03 PROCEDURE — 85379 FIBRIN DEGRADATION QUANT: CPT | Performed by: FAMILY MEDICINE

## 2023-04-03 PROCEDURE — 99284 EMERGENCY DEPT VISIT MOD MDM: CPT | Mod: CS | Performed by: FAMILY MEDICINE

## 2023-04-03 PROCEDURE — 84484 ASSAY OF TROPONIN QUANT: CPT | Performed by: FAMILY MEDICINE

## 2023-04-03 PROCEDURE — 96361 HYDRATE IV INFUSION ADD-ON: CPT | Performed by: FAMILY MEDICINE

## 2023-04-03 PROCEDURE — 85004 AUTOMATED DIFF WBC COUNT: CPT | Performed by: FAMILY MEDICINE

## 2023-04-03 PROCEDURE — 36415 COLL VENOUS BLD VENIPUNCTURE: CPT | Performed by: FAMILY MEDICINE

## 2023-04-03 PROCEDURE — 87637 SARSCOV2&INF A&B&RSV AMP PRB: CPT | Performed by: FAMILY MEDICINE

## 2023-04-03 PROCEDURE — 93005 ELECTROCARDIOGRAM TRACING: CPT | Performed by: FAMILY MEDICINE

## 2023-04-03 PROCEDURE — C9803 HOPD COVID-19 SPEC COLLECT: HCPCS | Performed by: FAMILY MEDICINE

## 2023-04-03 PROCEDURE — 93010 ELECTROCARDIOGRAM REPORT: CPT | Performed by: FAMILY MEDICINE

## 2023-04-03 PROCEDURE — 82803 BLOOD GASES ANY COMBINATION: CPT | Performed by: FAMILY MEDICINE

## 2023-04-03 PROCEDURE — 96360 HYDRATION IV INFUSION INIT: CPT | Performed by: FAMILY MEDICINE

## 2023-04-03 PROCEDURE — 71045 X-RAY EXAM CHEST 1 VIEW: CPT

## 2023-04-03 RX ADMIN — SODIUM CHLORIDE 1000 ML: 9 INJECTION, SOLUTION INTRAVENOUS at 11:54

## 2023-04-03 ASSESSMENT — ACTIVITIES OF DAILY LIVING (ADL)
ADLS_ACUITY_SCORE: 35
ADLS_ACUITY_SCORE: 35

## 2023-04-03 NOTE — TELEPHONE ENCOUNTER
Symptoms    Describe your symptoms: loosing voice today. Had covid and PAXLOVID treatment. Getting harder to breath. Chest hurts, and congestion are worse. Please advise    Any pain: Yes: when I breath    How long have you been having symptoms:i am worried about pneumonia. O2 this am was 96 . ususlly 99 to 100    Have you been seen for this:  Yes:     Appointment offered?: No    Triage offered?: Yes: transfer to RN

## 2023-04-03 NOTE — ED TRIAGE NOTES
Pt was covid positive 10x days ago. Was feeling 100% better yesterday. Mid sternal chest pain that started last night described as sharp. Increased today. Worse with activity. Voice went hoarse this morning as well.      Triage Assessment     Row Name 04/03/23 1041       Triage Assessment (Adult)    Airway WDL WDL       Respiratory WDL    Respiratory WDL WDL       Skin Circulation/Temperature WDL    Skin Circulation/Temperature WDL WDL       Cardiac WDL    Cardiac WDL X;chest pain       Chest Pain Assessment    Chest Pain Location midsternal    Character sharp    Precipitating Factors activity    Alleviating Factors rest       Peripheral/Neurovascular WDL    Peripheral Neurovascular WDL WDL       Cognitive/Neuro/Behavioral WDL    Cognitive/Neuro/Behavioral WDL WDL

## 2023-04-03 NOTE — ED PROVIDER NOTES
"  History     Chief Complaint   Patient presents with     Chest Pain     HPI  Blanche Lyman is a 28 year old female, past medical history is significant for depression, morbid obesity, presents to the emergency department with concerns of central chest pain upon awakening this morning.  History is obtained from the patient who states that she works at a  and unfortunately was diagnosed with COVID for the third time about 10 days ago.  She quarantined for 5 days as directed and has actually continued to improve after beginning with URI type symptoms primarily including congestion.  She felt \"100% back to normal\" yesterday and when she woke up today had dull achy constant central chest pain that does not radiate.  She feels mildly short of breath at rest and worse with exertion.  No associated lightheadedness, appreciation of palpitations or irregular heartbeat.  Denies fever chills or sweats.  No body aches.  She states that she has been eating and drinking normally.      Allergies:  Allergies   Allergen Reactions     Dairy Products [Milk Protein Extract] Other (See Comments)     Headaches, stomach aches       Peanuts [Nuts] Other (See Comments)     Headaches, stomach aches       Wheat Gluten [Gluten Meal] Other (See Comments)     Headaches, stomach aches         Problem List:    Patient Active Problem List    Diagnosis Date Noted     Major depression, recurrent (H) 02/23/2022     Priority: Medium     Morbid obesity (H) 02/23/2022     Priority: Medium        Past Medical History:    Past Medical History:   Diagnosis Date     Depressive disorder 2013       Past Surgical History:    No past surgical history on file.    Family History:    Family History   Problem Relation Age of Onset     Hypertension Father      Heart Disease Maternal Grandfather      Cancer Paternal Grandfather      Heart Disease Paternal Grandfather      Hypertension Paternal Grandfather      Hyperlipidemia Paternal Grandfather  " "      Social History:  Marital Status:  Single [1]  Social History     Tobacco Use     Smoking status: Never     Smokeless tobacco: Never   Vaping Use     Vaping status: Some Days     Substances: Nicotine     Devices: Disposable     Passive vaping exposure: Yes   Substance Use Topics     Alcohol use: Yes     Comment: Maybe 2-3 drinks a week.     Drug use: Never        Medications:    benzonatate (TESSALON) 100 MG capsule  desogestrel-ethinyl estradiol (APRI) 0.15-30 MG-MCG tablet  hydrOXYzine (ATARAX) 25 MG tablet  polyethylene glycol (MIRALAX) 17 GM/Dose powder  senna-docusate (SENOKOT-S/PERICOLACE) 8.6-50 MG tablet  traZODone (DESYREL) 50 MG tablet  venlafaxine (EFFEXOR XR) 75 MG 24 hr capsule  venlafaxine (EFFEXOR-ER) 75 MG 24 hr tablet          Review of Systems   All other systems reviewed and are negative.      Physical Exam   BP: (!) 148/104  Pulse: 111  Temp: 97.7  F (36.5  C)  Resp: 18  Height: 170.2 cm (5' 7\")  Weight: 108.9 kg (240 lb)  SpO2: 99 %      Physical Exam  Vitals and nursing note reviewed.   Constitutional:       General: She is not in acute distress.     Appearance: She is well-developed and normal weight. She is not ill-appearing.   HENT:      Head: Normocephalic and atraumatic.   Eyes:      Extraocular Movements: Extraocular movements intact.      Pupils: Pupils are equal, round, and reactive to light.   Cardiovascular:      Rate and Rhythm: Regular rhythm. Tachycardia present.      Heart sounds: Normal heart sounds.   Pulmonary:      Effort: Pulmonary effort is normal.      Breath sounds: Normal breath sounds.   Abdominal:      General: Bowel sounds are normal.      Palpations: Abdomen is soft.   Musculoskeletal:         General: Normal range of motion.      Cervical back: Normal range of motion and neck supple.   Skin:     General: Skin is warm and dry.      Capillary Refill: Capillary refill takes less than 2 seconds.   Neurological:      General: No focal deficit present.      Mental " Status: She is alert.   Psychiatric:         Mood and Affect: Mood normal.         Behavior: Behavior normal.         ED Course                 Procedures              EKG Interpretation:      Interpreted by Humza Torres MD  Time reviewed: Time obtained 11:00 time interpreted same 105 bpm sinus tachycardia, RSR prime V1, no acute ST-T wave changes normal axis, normal intervals.  Impression sinus tachycardia               Results for orders placed or performed during the hospital encounter of 04/03/23 (from the past 24 hour(s))   CBC with platelets, differential    Narrative    The following orders were created for panel order CBC with platelets, differential.  Procedure                               Abnormality         Status                     ---------                               -----------         ------                     CBC with platelets and d...[[id number]]                      Final result                 Please view results for these tests on the individual orders.   Comprehensive metabolic panel   Result Value Ref Range    Sodium 136 136 - 145 mmol/L    Potassium 4.4 3.4 - 5.3 mmol/L    Chloride 101 98 - 107 mmol/L    Carbon Dioxide (CO2) 25 22 - 29 mmol/L    Anion Gap 10 7 - 15 mmol/L    Urea Nitrogen 11.4 6.0 - 20.0 mg/dL    Creatinine 0.77 0.51 - 0.95 mg/dL    Calcium 9.6 8.6 - 10.0 mg/dL    Glucose 83 70 - 99 mg/dL    Alkaline Phosphatase 71 35 - 104 U/L    AST 18 10 - 35 U/L    ALT 24 10 - 35 U/L    Protein Total 7.0 6.4 - 8.3 g/dL    Albumin 3.9 3.5 - 5.2 g/dL    Bilirubin Total <0.2 <=1.2 mg/dL    GFR Estimate >90 >60 mL/min/1.73m2   Troponin T, High Sensitivity   Result Value Ref Range    Troponin T, High Sensitivity <6 <=14 ng/L   D dimer quantitative   Result Value Ref Range    D-Dimer Quantitative 0.43 0.00 - 0.50 ug/mL FEU    Narrative    This D-dimer assay is intended for use in conjunction with a clinical pretest probability assessment model to exclude pulmonary embolism (PE)  and deep venous thrombosis (DVT) in outpatients suspected of PE or DVT. The cut-off value is 0.50 ug/mL FEU.   Blood gas venous   Result Value Ref Range    pH Venous 7.39 7.32 - 7.43    pCO2 Venous 51 (H) 40 - 50 mm Hg    pO2 Venous 21 (L) 25 - 47 mm Hg    Bicarbonate Venous 31 (H) 21 - 28 mmol/L    Base Excess/Deficit (+/-) 4.2 (H) -7.7 - 1.9 mmol/L    FIO2 0    Symptomatic Influenza A/B, RSV, & SARS-CoV2 PCR (COVID-19) Nasopharyngeal    Specimen: Nasopharyngeal; Swab   Result Value Ref Range    Influenza A PCR Negative Negative    Influenza B PCR Negative Negative    RSV PCR Negative Negative    SARS CoV2 PCR Negative Negative    Narrative    Testing was performed using the Xpert Xpress CoV2/Flu/RSV Assay on the [employer] Instrument. This test should be ordered for the detection of SARS-CoV-2, influenza, and RSV viruses in individuals who meet clinical and/or epidemiological criteria. Test performance is unknown in asymptomatic patients. This test is for in vitro diagnostic use under the FDA EUA for laboratories certified under CLIA to perform high or moderate complexity testing. This test has not been FDA cleared or approved. A negative result does not rule out the presence of PCR inhibitors in the specimen or target RNA in concentration below the limit of detection for the assay. If only one viral target is positive but coinfection with multiple targets is suspected, the sample should be re-tested with another FDA cleared, approved, or authorized test, if coinfection would change clinical management. This test was validated by the [facility]. These laboratories are certified under the Clinical Laboratory Improvement Amendments of 1988 (CLIA-88) as qualified to perform high complexity laboratory testing.   CBC with platelets and differential   Result Value Ref Range    WBC Count 8.6 4.0 - 11.0 10e3/uL    RBC Count 4.65 3.80 - 5.20 10e6/uL    Hemoglobin 14.3 11.7 - 15.7 g/dL     Hematocrit 42.6 35.0 - 47.0 %    MCV 92 78 - 100 fL    MCH 30.8 26.5 - 33.0 pg    MCHC 33.6 31.5 - 36.5 g/dL    RDW 11.9 10.0 - 15.0 %    Platelet Count 351 150 - 450 10e3/uL    % Neutrophils 67 %    % Lymphocytes 22 %    % Monocytes 11 %    % Eosinophils 0 %    % Basophils 0 %    % Immature Granulocytes 0 %    NRBCs per 100 WBC 0 <1 /100    Absolute Neutrophils 5.8 1.6 - 8.3 10e3/uL    Absolute Lymphocytes 1.9 0.8 - 5.3 10e3/uL    Absolute Monocytes 0.9 0.0 - 1.3 10e3/uL    Absolute Eosinophils 0.0 0.0 - 0.7 10e3/uL    Absolute Basophils 0.0 0.0 - 0.2 10e3/uL    Absolute Immature Granulocytes 0.0 <=0.4 10e3/uL    Absolute NRBCs 0.0 10e3/uL   XR Chest Port 1 View    Narrative    CHEST ONE VIEW  4/3/2023 12:30 PM     HISTORY: SOA,covid positive    COMPARISON: None.      Impression    IMPRESSION: No acute disease.    PAPI BATISTA MD         SYSTEM ID:  [id number]   1:35 PM  Recheck on the patient at this time finds her comfortable.  We reviewed all diagnostics performed.  We discussed differential diagnostic considerations, my suspicion at this point with the patient's work-up for serious underlying etiology for her chest pain is very small.  I think that her discomfort is quite likely related to recent viral infection i.e. COVID-19 and I shared this thought with her.  She is comfortable with plan for disposition to home.  She will observe her symptoms and if not improving or worse or concerning changes will return to the emergency department.      Medications   0.9% sodium chloride BOLUS (0 mLs Intravenous Stopped 4/3/23 1333)       Assessments & Plan (with Medical Decision Making)   Assessments and plan with medical decision making at the time stamp above.      Disclaimer: This note consists of symbols derived from keyboarding, dictation and/or voice recognition software. As a result, there may be errors in the script that have gone undetected. Please consider this when interpreting information found in this  chart.      I have reviewed the nursing notes.    I have reviewed the findings, diagnosis, plan and need for follow up with the patient.      New Prescriptions    No medications on file       Final diagnoses:   Atypical chest pain - Likely pleuritic secondary to recent COVID infection       4/3/2023   [facility] EMERGENCY DEPT     Humza Torres MD  04/03/23 2485

## 2023-04-03 NOTE — DISCHARGE INSTRUCTIONS
Tylenol/ibuprofen as needed for discomfort.  As we discussed today your diagnostic evaluation which included EKG, lab diagnostics and chest x-ray imaging is reassuring that there is no serious underlying reason for your pain.  I suspect that this is irritation due to a recent viral illness i.e. COVID-19 infection.  This should pass with time.  Please observe symptoms and if worse or changing in concerning way please return to the emergency department.

## 2023-04-03 NOTE — TELEPHONE ENCOUNTER
S-(situation) I talked with Blanche.  She C/O hard to breath and when she does breath chest hurts.  Felt 100 % yesterday she says.  States has head congestion.  No fever.  Asking if would be prescribed antibiotic for laryngitis.    B-(background): had positive COVID 11 days ago she says.  Negative for COVID this AM.  O2 sat at home was 96 this AM.  She does not sound short of breath on the phone while talking.  Voice is somewhat hoarse.    A-(assessment): voice hoarse, possible laryngitis     R-(recommendations): encouraged rest voice, fluids, humidity.  Needs to be seen if chest pain while breathing  Kendy Valdez RN

## 2023-04-04 ENCOUNTER — PATIENT OUTREACH (OUTPATIENT)
Dept: FAMILY MEDICINE | Facility: CLINIC | Age: 28
End: 2023-04-04
Payer: MEDICAID

## 2023-04-04 NOTE — TELEPHONE ENCOUNTER
ED / Discharge Outreach Protocol    Patient Contact    Attempt # 1    Was call answered?  No.  Left message on voicemail with information to call me back. Yudi BAUTISTA RN

## 2023-04-14 ENCOUNTER — OFFICE VISIT (OUTPATIENT)
Dept: FAMILY MEDICINE | Facility: CLINIC | Age: 28
End: 2023-04-14
Payer: COMMERCIAL

## 2023-04-14 VITALS
HEART RATE: 99 BPM | DIASTOLIC BLOOD PRESSURE: 86 MMHG | RESPIRATION RATE: 16 BRPM | OXYGEN SATURATION: 100 % | SYSTOLIC BLOOD PRESSURE: 124 MMHG | TEMPERATURE: 98.2 F

## 2023-04-14 DIAGNOSIS — J20.9 ACUTE BRONCHITIS WITH SYMPTOMS > 10 DAYS: Primary | ICD-10-CM

## 2023-04-14 PROCEDURE — 99213 OFFICE O/P EST LOW 20 MIN: CPT | Performed by: NURSE PRACTITIONER

## 2023-04-14 RX ORDER — PREDNISONE 20 MG/1
20 TABLET ORAL 2 TIMES DAILY
Qty: 10 TABLET | Refills: 0 | Status: SHIPPED | OUTPATIENT
Start: 2023-04-14 | End: 2023-04-19

## 2023-04-14 ASSESSMENT — PAIN SCALES - GENERAL: PAINLEVEL: NO PAIN (0)

## 2023-04-14 ASSESSMENT — PATIENT HEALTH QUESTIONNAIRE - PHQ9
SUM OF ALL RESPONSES TO PHQ QUESTIONS 1-9: 0
SUM OF ALL RESPONSES TO PHQ QUESTIONS 1-9: 0

## 2023-04-14 ASSESSMENT — ENCOUNTER SYMPTOMS: COUGH: 1

## 2023-04-14 NOTE — PROGRESS NOTES
Assessment & Plan     Acute bronchitis with symptoms > 10 days  -due to duration of symptoms recommended to start Augmentin to cover for bacterial etiology  -continue over the counter Mucinex   - amoxicillin-clavulanate (AUGMENTIN) 875-125 MG tablet; Take 1 tablet by mouth 2 times daily for 7 days  - predniSONE (DELTASONE) 20 MG tablet; Take 1 tablet (20 mg) by mouth 2 times daily for 5 days  -follow up if no improvement in 7-10 days     MARIO Hyde CNP  [facility]    Rajesh Mancia is a 28 year old, presenting for the following health issues:  Cough    Cough    History of Present Illness       Reason for visit:  Coughing and pain in my neck/tonsil area when swallowing. Each morning I cough up a lot of yellow colored mucus. I am congested as well. More in the head because it does not want to come out the nose.  Symptom onset:  3-7 days ago  Symptoms include:  Coughing, congestion, and sore tonsils  Symptom intensity:  Moderate  Symptom progression:  Worsening  Had these symptoms before:  No  What makes it worse:  Talking makes me cough more. Swallowing hurts.  What makes it better:  Having a warm beverage, or having a popsicle.    She eats 4 or more servings of fruits and vegetables daily.She consumes 0 sweetened beverage(s) daily.She exercises with enough effort to increase her heart rate 10 to 19 minutes per day.  She exercises with enough effort to increase her heart rate 3 or less days per week.   She is taking medications regularly.    Today's PHQ-9         PHQ-9 Total Score: 0    PHQ-9 Q9 Thoughts of better off dead/self-harm past 2 weeks :   Not at all             Review of Systems   Respiratory: Positive for cough.             Objective    /86 (BP Location: Left arm, Patient Position: Sitting, Cuff Size: Adult Large)   Pulse 99   Temp 98.2  F (36.8  C) (Tympanic)   Resp 16   SpO2 100%   There is no height or weight on file to calculate BMI.  Physical Exam    GENERAL: healthy, alert and no distress  EYES: Eyes grossly normal to inspection, PERRL and conjunctivae and sclerae normal  HENT: normal cephalic/atraumatic, right ear: mild ear canal erythema, TM intact, left ear: normal: no effusions, no erythema, normal landmarks, oropharynx clear and oral mucous membranes moist  NECK: bilateral anterior cervical adenopathy  RESP: lungs clear to auscultation - no rales, rhonchi or wheezes  SKIN: no suspicious lesions or rashes  NEURO: Normal strength and tone, mentation intact and speech normal  PSYCH: mentation appears normal, affect normal/bright

## 2023-04-14 NOTE — PATIENT INSTRUCTIONS
Start Augmentin 1 tablet twice daily for 7 days   Prednisone 20 mg twice daily for 5 days     Can return to work if feeling better

## 2023-04-23 ENCOUNTER — HEALTH MAINTENANCE LETTER (OUTPATIENT)
Age: 28
End: 2023-04-23

## 2023-04-25 DIAGNOSIS — F33.41 RECURRENT MAJOR DEPRESSIVE DISORDER, IN PARTIAL REMISSION (H): ICD-10-CM

## 2023-04-25 RX ORDER — VENLAFAXINE HYDROCHLORIDE 75 MG/1
CAPSULE, EXTENDED RELEASE ORAL
Qty: 270 CAPSULE | Refills: 1 | Status: SHIPPED | OUTPATIENT
Start: 2023-04-25 | End: 2023-11-15

## 2023-05-10 ENCOUNTER — OFFICE VISIT (OUTPATIENT)
Dept: FAMILY MEDICINE | Facility: CLINIC | Age: 28
End: 2023-05-10
Payer: COMMERCIAL

## 2023-05-10 VITALS
HEIGHT: 67 IN | TEMPERATURE: 97.3 F | RESPIRATION RATE: 14 BRPM | WEIGHT: 253 LBS | SYSTOLIC BLOOD PRESSURE: 138 MMHG | DIASTOLIC BLOOD PRESSURE: 80 MMHG | OXYGEN SATURATION: 100 % | BODY MASS INDEX: 39.71 KG/M2 | HEART RATE: 110 BPM

## 2023-05-10 DIAGNOSIS — Z00.00 ROUTINE GENERAL MEDICAL EXAMINATION AT A HEALTH CARE FACILITY: Primary | ICD-10-CM

## 2023-05-10 DIAGNOSIS — Z30.41 SURVEILLANCE FOR BIRTH CONTROL, ORAL CONTRACEPTIVES: ICD-10-CM

## 2023-05-10 PROCEDURE — 99395 PREV VISIT EST AGE 18-39: CPT | Performed by: NURSE PRACTITIONER

## 2023-05-10 RX ORDER — DESOGESTREL AND ETHINYL ESTRADIOL 0.15-0.03
1 KIT ORAL DAILY
Qty: 84 TABLET | Refills: 3 | Status: SHIPPED | OUTPATIENT
Start: 2023-05-10 | End: 2023-05-15

## 2023-05-10 ASSESSMENT — ENCOUNTER SYMPTOMS
JOINT SWELLING: 0
NAUSEA: 0
CONSTIPATION: 0
PARESTHESIAS: 0
SHORTNESS OF BREATH: 0
HEADACHES: 0
EYE PAIN: 0
FREQUENCY: 0
ABDOMINAL PAIN: 0
DIARRHEA: 0
SORE THROAT: 0
DIZZINESS: 0
COUGH: 0
FEVER: 0
MYALGIAS: 0
HEMATURIA: 0
BREAST MASS: 0
DYSURIA: 0
PALPITATIONS: 0
HEMATOCHEZIA: 0
HEARTBURN: 0
NERVOUS/ANXIOUS: 0
ARTHRALGIAS: 0
WEAKNESS: 0
CHILLS: 0

## 2023-05-10 NOTE — PROGRESS NOTES
SUBJECTIVE:   CC: Blanche is an 28 year old who presents for preventive health visit.       5/10/2023     5:05 PM   Additional Questions   Roomed by chrissie     Healthy Habits:     Getting at least 3 servings of Calcium per day:  Yes    Bi-annual eye exam:  Yes    Dental care twice a year:  NO    Sleep apnea or symptoms of sleep apnea:  Excessive snoring    Diet:  Regular (no restrictions)    Frequency of exercise:  None    Taking medications regularly:  Yes    Medication side effects:  None    PHQ-2 Total Score: 0    Additional concerns today:  No        Today's PHQ-2 Score:       5/10/2023     2:30 PM   PHQ-2 (  [employer])   Q1: Little interest or pleasure in doing things 0   Q2: Feeling down, depressed or hopeless 0   PHQ-2 Score 0   Q1: Little interest or pleasure in doing things Not at all   Q2: Feeling down, depressed or hopeless Not at all   PHQ-2 Score 0           Social History     Tobacco Use     Smoking status: Light Smoker     Types: Other     Smokeless tobacco: Never   Vaping Use     Vaping status: Some Days     Substances: Nicotine     Devices: Disposable     Passive vaping exposure: Yes   Substance Use Topics     Alcohol use: Yes     Comment: Maybe 2-3 drinks a week.             5/10/2023     2:30 PM   Alcohol Use   Prescreen: >3 drinks/day or >7 drinks/week? No     Reviewed orders with patient.  Reviewed health maintenance and updated orders accordingly - Yes  Lab work is in process  Labs reviewed in EPIC    Breast Cancer Screenin/23/2022     4:52 PM   Breast CA Risk Assessment (FHS-7)   Do you have a family history of breast, colon, or ovarian cancer? No / Unknown         Patient under 40 years of age: Routine Mammogram Screening not recommended.   Pertinent mammograms are reviewed under the imaging tab.    History of abnormal Pap smear: NO - age 21-29 PAP every 3 years recommended      2022     5:38 PM 2018    11:41 AM   PAP / HPV   PAP Negative for Intraepithelial Lesion or  "Malignancy (NILM)      PAP (Historical)  NIL       Reviewed and updated as needed this visit by clinical staff   Tobacco  Allergies  Meds              Reviewed and updated as needed this visit by Provider                 Past Medical History:   Diagnosis Date     Depressive disorder 2013    I am on zoloft for this.      No past surgical history on file.  OB History   No obstetric history on file.       Review of Systems   Constitutional: Negative for chills and fever.   HENT: Negative for congestion, ear pain, hearing loss and sore throat.    Eyes: Negative for pain and visual disturbance.   Respiratory: Negative for cough and shortness of breath.    Cardiovascular: Negative for chest pain, palpitations and peripheral edema.   Gastrointestinal: Negative for abdominal pain, constipation, diarrhea, heartburn, hematochezia and nausea.   Breasts:  Negative for tenderness, breast mass and discharge.   Genitourinary: Positive for vaginal bleeding. Negative for dysuria, frequency, genital sores, hematuria, pelvic pain, urgency and vaginal discharge.   Musculoskeletal: Negative for arthralgias, joint swelling and myalgias.   Skin: Negative for rash.   Neurological: Negative for dizziness, weakness, headaches and paresthesias.   Psychiatric/Behavioral: Negative for mood changes. The patient is not nervous/anxious.           OBJECTIVE:   /80   Pulse 110   Temp 97.3  F (36.3  C) (Tympanic)   Resp 14   Ht 1.689 m (5' 6.5\")   Wt 114.8 kg (253 lb)   LMP 05/03/2023   SpO2 100%   BMI 40.22 kg/m    Physical Exam  GENERAL: healthy, alert and no distress  EYES: Eyes grossly normal to inspection, PERRL and conjunctivae and sclerae normal  HENT: ear canals and TM's normal, nose and mouth without ulcers or lesions  NECK: no adenopathy, no asymmetry, masses, or scars and thyroid normal to palpation  RESP: lungs clear to auscultation - no rales, rhonchi or wheezes  BREAST: normal without masses, tenderness or nipple " "discharge and no palpable axillary masses or adenopathy  CV: regular rate and rhythm, normal S1 S2, no S3 or S4, no murmur, click or rub, no peripheral edema and peripheral pulses strong  ABDOMEN: soft, nontender, no hepatosplenomegaly, no masses and bowel sounds normal  MS: no gross musculoskeletal defects noted, no edema  SKIN: no suspicious lesions or rashes  NEURO: Normal strength and tone, mentation intact and speech normal  PSYCH: mentation appears normal, affect normal/bright    Diagnostic Test Results:  Labs reviewed in Epic  No results found for any visits on 05/10/23.    ASSESSMENT/PLAN:   Blanche was seen today for physical.    Diagnoses and all orders for this visit:    Routine general medical examination at a health care facility    Surveillance for birth control, oral contraceptives  -     Discontinue: desogestrel-ethinyl estradiol (APRI) 0.15-30 MG-MCG tablet; Take 1 tablet by mouth daily    Other orders  -     REVIEW OF HEALTH MAINTENANCE PROTOCOL ORDERS              COUNSELING:  Reviewed preventive health counseling, as reflected in patient instructions      BMI:   Estimated body mass index is 40.22 kg/m  as calculated from the following:    Height as of this encounter: 1.689 m (5' 6.5\").    Weight as of this encounter: 114.8 kg (253 lb).   Weight management plan: Discussed healthy diet and exercise guidelines      She reports that she has been smoking other. She has never used smokeless tobacco.  Nicotine/Tobacco Cessation Plan:   Information offered: Patient not interested at this time      Call or return to the clinic with any worsening of symptoms or no resolution. Patient/Parent verbalized understanding and is in agreement. Medication side effects reviewed.   Current Outpatient Medications   Medication Sig Dispense Refill     hydrOXYzine (ATARAX) 25 MG tablet Take 1 tablet (25 mg) by mouth 3 times daily as needed for anxiety 30 tablet 3     traZODone (DESYREL) 50 MG tablet TAKE 1 TABLET BY " MOUTH AT BEDTIME 90 tablet 1     venlafaxine (EFFEXOR XR) 75 MG 24 hr capsule TAKE 3 CAPSULES (225 MG) BYMOUTH DAILY. 270 capsule 1     ISIBLOOM 0.15-30 MG-MCG tablet TAKE 1 TABLET BY MOUTH DAILY 84 tablet 3     Chart documentation with Dragon Voice recognition Software. Although reviewed after completion, some words and grammatical errors may remain.  MARIO Kilgore [facility]

## 2023-05-15 DIAGNOSIS — Z30.41 SURVEILLANCE FOR BIRTH CONTROL, ORAL CONTRACEPTIVES: ICD-10-CM

## 2023-05-15 RX ORDER — DESOGESTREL AND ETHINYL ESTRADIOL 0.15-0.03
KIT ORAL
Qty: 84 TABLET | Refills: 0 | Status: SHIPPED | OUTPATIENT
Start: 2023-05-15 | End: 2023-05-22

## 2023-05-22 DIAGNOSIS — Z30.41 SURVEILLANCE FOR BIRTH CONTROL, ORAL CONTRACEPTIVES: ICD-10-CM

## 2023-05-22 RX ORDER — DESOGESTREL AND ETHINYL ESTRADIOL 0.15-0.03
KIT ORAL
Qty: 84 TABLET | Refills: 3 | Status: SHIPPED | OUTPATIENT
Start: 2023-05-22

## 2023-06-22 ENCOUNTER — E-VISIT (OUTPATIENT)
Dept: FAMILY MEDICINE | Facility: CLINIC | Age: 28
End: 2023-06-22
Payer: COMMERCIAL

## 2023-06-22 DIAGNOSIS — R05.3 CHRONIC COUGH: Primary | ICD-10-CM

## 2023-06-22 PROCEDURE — 99207 PR NON-BILLABLE SERV PER CHARTING: CPT | Performed by: NURSE PRACTITIONER

## 2023-06-23 NOTE — PATIENT INSTRUCTIONS
Dear Blanche Lyman,    We are sorry you are not feeling well. Based on the responses you provided, it is recommended that you be seen in-person in clinic or urgent care so we can better evaluate your symptoms. Please click here to find the nearest urgent care location to you.   You will not be charged for this Visit. Thank you for trusting us with your care.    MARIO Kilgore CNP

## 2023-08-16 NOTE — PROGRESS NOTES
Ortho Evaluation  06/30/21 1600   General Information   Type of Visit Initial OP Ortho PT Evaluation   Start of Care Date 06/30/21   Referring Physician Ryan Charlton   Patient/Family Goals Statement Get out of boot, stairs, walking.    Orders Evaluate and Treat   Date of Order 06/22/21   Certification Required? Yes  (ANDREAS Yin'lucas )   Medical Diagnosis Sprain of Ankle Ligaments,  Peroneal Tendonitis,    Surgical/Medical history reviewed   (Depression, Multiple L ankle sprains. )   Precautions/Limitations   (Be off foot as much as you can. )   Special Instructions Started to wear camwalker 4 days after injury.    General Information Comments Ibuprofen type medication once/day.    Body Part(s)   Body Part(s) Ankle/Foot   Presentation and Etiology   Pertinent history of current problem (include personal factors and/or comorbidities that impact the POC) 5/22/21 was walking in yard at [employer] house and stepped in hole and ankle had inversion and eversion injury. Had pain, bruising, swelling initially. Saw a NP and she ordered MRI, and was sent to [facility]. Switched to a smaller boot. Saw him x 2 and then he referred her for therapy. Has had multiple sprains before of L ankle, but no PT.    Impairments A. Pain;B. Decreased WB tolerance;C. Swelling;G. Impaired balance;H. Impaired gait   Functional Limitations perform desired leisure / sports activities   Symptom Location Pain mostly around L Lateral malleoli and top of foot. Still swelling at end of day.    How/Where did it occur At home;With a fall   Onset date of current episode/exacerbation 05/22/21   Chronicity New   Pain rating (0-10 point scale)   (2-7/10)   Pain quality A. Sharp;C. Aching;E. Shooting   Frequency of pain/symptoms C. With activity   Pain/symptoms are: Worse during the day  (End of day. )   Pain/symptoms exacerbated by B. Walking;D. Carrying;G. Certain positions   Pain/symptoms eased by A. Sitting;C. Rest;E. Changing positions;J. Braces/supports    Progression of symptoms since onset: Improved  (Pain meds help. )   Current / Previous Interventions   Diagnostic Tests: MRI   MRI Results Results   MRI results Bone Contusion, Full thickness tear of Ant Talo-Fib ligament. Deltoid ligament sprains.    Prior Level of Function   Functional Level Prior Comment Independent w/ADL's   Current Level of Function   Current Community Support Family/friend caregiver   Patient role/employment history A. Employed   Employment Comments  at     Living environment House/[facility]   Home/community accessibility Stairs w/ railings.    Current equipment-Gait/Locomotion Other  (Cam boot. )   Fall Risk Screen   Fall screen completed by PT   Have you fallen 2 or more times in the past year? No   Have you fallen and had an injury in the past year? No   Is patient a fall risk? No   System Outcome Measures   Outcome Measures   (LEFS  41/80 )   Functional Scales   Functional Scales OPTIMAL   Optimal (1=able to do without difficulty, 2=able to do with little difficulty, 3=able to do with moderate difficulty, 4=able to do with much difficulty, 5=unable to do, 9=NA) Activity 1;Activity 2;Activity 3   Activity 1 comment Reliant on Cam walker, has not switched to brace yet.    Activity 2 comment Walking 2 blocks a lot of difficulty per LEFS   Activity 3 comment Stairs - a lot of difficulty per LEFS.    Ankle/Foot Objective Findings   Integumentary Swelling Lateral Malleoli and Medial Upper foot.    Gait/Locomotion Toes out B, More pain on stairs vs. level w/ boot on. Without boot, antalgic gait and takes stairs 1 legged.    Foot Position In Standing Pronated Feet B.    Ankle/Foot ROM Comment PF L 41, R 60, DF L 4, R 14; DF w/ knee bent L 13, R 19.     Ankle/Foot Strength Comments L DF 4 and Pful, More painful w/ Inversion vs. Eversion and 4+/5.    Ankle/Foot Flexibility Comments HS's -20 B.   Palpation Tender Deltoid, Lateral Malleoli all ligaments.    Planned Therapy  Interventions   Planned Therapy Interventions Comment DTFM, STM, Jt mobs, Develop HEP, PTRX# [id number]   Planned Modality Interventions   Planned Modality Interventions Comments US   Clinical Impression   Criteria for Skilled Therapeutic Interventions Met yes, treatment indicated   PT Diagnosis L Severe Ankle Sprain, Multiple previous Ankle Sprains.    Influenced by the following impairments Pain, Decreased WBing tolerance, Imapired Balance/Gait.    Functional limitations due to impairments Normal, safe mobility at home and in community, Walking, Stairs.    Clinical Presentation Evolving/Changing   Clinical Presentation Rationale LEFS, AROM, Palpation, MMT, Taking stairs 1 legged.    Clinical Decision Making (Complexity) Moderate complexity   Therapy Frequency 2 times/Week   Predicted Duration of Therapy Intervention (days/wks) 6 weeks, 8 visits as needed and decreasing frequency as appropriate.    Risk & Benefits of therapy have been explained Yes   Patient, Family & other staff in agreement with plan of care Yes   Clinical Impression Comments L Severe Ankle Sprain, Multiple previous Ankle Sprains.    Education Assessment   Preferred Learning Style Listening;Demonstration;Pictures/video   Barriers to Learning No barriers   ORTHO GOALS   PT Ortho Eval Goals 1;2;3;4   Ortho Goal 1   Goal Identifier 1   Goal Description STG: Pt will be able to walk 2 blocks w/ moderate difficulty   Target Date 07/28/21   Ortho Goal 2   Goal Identifier 2   Goal Description STG: Pt will be able to do stairs normally and w/ minimal difficulty.    Target Date 07/28/21   Ortho Goal 3   Goal Identifier 3   Goal Description STG: Pt will be able to transition to a brace for better ability to navigate stairs and walking more normally.    Target Date 07/28/21   Ortho Goal 4   Goal Identifier 4   Goal Description LTG: Pt will be independent w/ HEP and self cares to manage Severe L Ankle Sprain.    Target Date 08/12/21   Total Evaluation Time    PT Víctor, Moderate Complexity Minutes ([id number]) 30   Therapy Certification   Certification date from 06/30/21   Certification date to 08/05/21   Medical Diagnosis L Sprain of Ankle Ligaments,  Peroneal Tendonitis,    Serena Magana, PT, [facility] (#[id number])  [facility]           [phone]  Fax          [phone]  Appt #      [phone]     Spontaneous, unlabored and symmetrical

## 2023-08-20 DIAGNOSIS — F51.04 CHRONIC INSOMNIA: ICD-10-CM

## 2023-08-21 RX ORDER — TRAZODONE HYDROCHLORIDE 50 MG/1
TABLET, FILM COATED ORAL
Qty: 90 TABLET | Refills: 3 | Status: SHIPPED | OUTPATIENT
Start: 2023-08-21 | End: 2024-08-13

## 2023-10-04 NOTE — NURSING NOTE
"Chief Complaint   Patient presents with     Consult     Sprain of anterior talofibular ligament, MRI 5/28/21       Initial BP (!) 151/113   Pulse 72   Ht 1.702 m (5' 7\")   Wt 113.4 kg (250 lb)   BMI 39.16 kg/m   Estimated body mass index is 39.16 kg/m  as calculated from the following:    Height as of this encounter: 1.702 m (5' 7\").    Weight as of this encounter: 113.4 kg (250 lb).  Medications and allergies reviewed.      Linda BARRIENTOS MA    " Please check with pt if still experiencing neuropathy pain in the arms and hands. How is her BP doing? If needed, I would see her sooner than January to evaluate neuropathy sx.

## 2023-11-15 DIAGNOSIS — F33.41 RECURRENT MAJOR DEPRESSIVE DISORDER, IN PARTIAL REMISSION (H): ICD-10-CM

## 2023-11-15 RX ORDER — VENLAFAXINE HYDROCHLORIDE 75 MG/1
225 CAPSULE, EXTENDED RELEASE ORAL DAILY
Qty: 270 CAPSULE | Refills: 0 | Status: SHIPPED | OUTPATIENT
Start: 2023-11-15 | End: 2024-02-15

## 2023-11-15 NOTE — TELEPHONE ENCOUNTER
Routing refill request to provider for review/approval because:  Labs out of range:        7/12/2022     9:25 AM 7/20/2022     2:56 PM 4/14/2023     1:58 AM   PHQ   PHQ-9 Total Score 13 11 0   Q9: Thoughts of better off dead/self-harm past 2 weeks Several days Several days Not at all   F/U: Thoughts of suicide or self-harm No Yes    F/U: Self harm-plan  Yes    F/U: Self-harm action  No    F/U: Safety concerns No No          12/2/2021     9:25 AM 2/2/2022     6:45 AM 3/17/2022     3:54 PM   MIGUEL-7 SCORE   Total Score 8 (mild anxiety) 5 (mild anxiety) 6 (mild anxiety)   Total Score 8 5 6     PHQ-9/MIGUEL questionnaire's sent via [employer] for completion.   Last Written Prescription Date:  4/25/23  Last Fill Quantity: 270,  # refills: 1   Last office visit: 5/10/2023 ; last virtual visit: 3/17/2022 with prescribing provider:     Future Office Visit:  None.     Julie Behrendt RN

## 2023-11-16 ASSESSMENT — ANXIETY QUESTIONNAIRES
IF YOU CHECKED OFF ANY PROBLEMS ON THIS QUESTIONNAIRE, HOW DIFFICULT HAVE THESE PROBLEMS MADE IT FOR YOU TO DO YOUR WORK, TAKE CARE OF THINGS AT HOME, OR GET ALONG WITH OTHER PEOPLE: NOT DIFFICULT AT ALL
3. WORRYING TOO MUCH ABOUT DIFFERENT THINGS: NOT AT ALL
7. FEELING AFRAID AS IF SOMETHING AWFUL MIGHT HAPPEN: NOT AT ALL
GAD7 TOTAL SCORE: 2
5. BEING SO RESTLESS THAT IT IS HARD TO SIT STILL: NOT AT ALL
2. NOT BEING ABLE TO STOP OR CONTROL WORRYING: SEVERAL DAYS
1. FEELING NERVOUS, ANXIOUS, OR ON EDGE: SEVERAL DAYS
6. BECOMING EASILY ANNOYED OR IRRITABLE: NOT AT ALL
GAD7 TOTAL SCORE: 2

## 2023-11-16 ASSESSMENT — PATIENT HEALTH QUESTIONNAIRE - PHQ9
5. POOR APPETITE OR OVEREATING: NOT AT ALL
SUM OF ALL RESPONSES TO PHQ QUESTIONS 1-9: 2

## 2024-02-12 DIAGNOSIS — F33.41 RECURRENT MAJOR DEPRESSIVE DISORDER, IN PARTIAL REMISSION (H): ICD-10-CM

## 2024-02-15 RX ORDER — VENLAFAXINE HYDROCHLORIDE 75 MG/1
225 CAPSULE, EXTENDED RELEASE ORAL DAILY
Qty: 270 CAPSULE | Refills: 0 | Status: SHIPPED | OUTPATIENT
Start: 2024-02-15 | End: 2024-05-13

## 2024-05-12 DIAGNOSIS — F33.41 RECURRENT MAJOR DEPRESSIVE DISORDER, IN PARTIAL REMISSION (H): ICD-10-CM

## 2024-05-13 RX ORDER — VENLAFAXINE HYDROCHLORIDE 75 MG/1
225 CAPSULE, EXTENDED RELEASE ORAL DAILY
Qty: 270 CAPSULE | Refills: 0 | Status: SHIPPED | OUTPATIENT
Start: 2024-05-13

## 2024-05-13 NOTE — TELEPHONE ENCOUNTER
Refilled 1 time only, please call patient to schedule for Preventive on/after June 23.  Tete Alas MSN, RN

## 2024-06-23 NOTE — PROGRESS NOTES
ASSESSMENT & PLAN  Blanche was seen today for pain.    Diagnoses and all orders for this visit:    Moderate left ankle sprain, initial encounter  -     [facility]; Future  -     order for DME; Equipment being ordered: medium figure 8 ankle brace    Acute pain of left knee  -     XR Tibia & Fibula Left 2 Views; Future  -     order for DME; Equipment being ordered: medium figure 8 ankle brace      Patient is a 24 year old female presenting for evaluation of   Chief Complaint   Patient presents with     Left Ankle - Pain      Left Ankle Sprain: TTP over ATFL, mild over CFL with pos Talar tilt.  XR neg x2 for ankle fx and Tib/fib neg for Ma[place]oneuve fx.  Pain improved concern for prolong immobilization in IWalk and CAM boot.  Plan to transition pt to ankle brace, refer to formal PT and f/u if not improved  Treatment: transition to ankle brace  Physical Therapy ordered  Injection none  Medications  Limited NSAIDs/Tylenol    Concerning signs/sx that would warrant urgent evaluation were discussed.  All questions were answered, patient understands and agrees with plan.      No follow-ups on file.      -----    SUBJECTIVE  Blanche Lyman is a/an 24 year old female who is seen as an  referral for evaluation of right ankle pain. The patient is seen by themselves.    Onset: 7/17/19, 2.5 week(s) ago. Patient describes injury as walking dog, stepped in hole and twisted ankle.  Felt a pop, crunch and pain.   Location of Pain: left lateral ankle   Rating of Pain at worst: 5/10  Rating of Pain Currently: 3/10  Worsened by: weight bearing, inversion   Better with: non weight bearing   Treatments tried: iwalk, cam boot, Tylenol   Associated symptoms: numbness in toes, swelling   Orthopedic history: YES - self limiting ankle injury   Relevant surgical history: NO  No past medical history on file.  Social History     Socioeconomic History     Marital status: Single     Spouse name: None     Number of  "children: None     Years of education: None     Highest education level: None   Occupational History     None   Social Needs     Financial resource strain: None     Food insecurity:     Worry: None     Inability: None     Transportation needs:     Medical: None     Non-medical: None   Tobacco Use     Smoking status: Never Smoker     Smokeless tobacco: Never Used   Substance and Sexual Activity     Alcohol use: None     Drug use: None     Sexual activity: None   Lifestyle     Physical activity:     Days per week: None     Minutes per session: None     Stress: None   Relationships     Social connections:     Talks on phone: None     Gets together: None     Attends Mu-ism service: None     Active member of club or organization: None     Attends meetings of clubs or organizations: None     Relationship status: None     Intimate partner violence:     Fear of current or ex partner: None     Emotionally abused: None     Physically abused: None     Forced sexual activity: None   Other Topics Concern     Parent/sibling w/ CABG, MI or angioplasty before 65F 55M? No   Social History Narrative     None         Patient's past medical, surgical, social, and family histories were reviewed today and no changes are noted.    REVIEW OF SYSTEMS:  10 point ROS is negative other than symptoms noted above in HPI, Past Medical History or as stated below  Constitutional: NEGATIVE for fever, chills, change in weight  Skin: NEGATIVE for worrisome rashes, moles or lesions  GI/: NEGATIVE for bowel or bladder changes  Neuro: NEGATIVE for weakness, dizziness or paresthesias    OBJECTIVE:  /70   Ht 1.702 m (5' 7\")   BMI 32.89 kg/m     General: healthy, alert and in no distress  HEENT: no scleral icterus or conjunctival erythema  Skin: no suspicious lesions or rash. No jaundice.  CV:  no pedal edema  Resp: normal respiratory effort without conversational dyspnea   Psych: normal mood and affect  Gait: normal steady gait with " appropriate coordination and balance  Neuro: Normal light sensory exam of lower extremity  MSK:  LEFT ANKLE  Inspection:  Lateral ankle swelling, ecchymosis over dorsal MTP joints  Palpation:    Tender about the ATFL, CFL and mild pain diffusely around swelling with no focal tenderness.  TTP over prox fibula. Remainder of bony and ligamentous landmarks are nontender.  Range of Motion:     Plantarflexion full / dorsiflexion full / inversion limited slightly by pain / eversion limited slightly by pain  Strength:    full  Special Tests:    negative anterior drawer, positive talar tilt, negative valgus stress, negative forced external rotation/eversion, negative Stewart sign, negative squeeze test. Unable to perform heel raise and Unable to hop.    LEFT FOOT  Inspection:    no swelling or ecchymosis  Palpation:    Non-tender.  Range of Motion:     Grossly intact and non-painful  Strength:    Grossly intact in all planes  Special Tests:    Positive: None    Negative: anterior drawer, calcaneal squeeze, MTP stress and Lisfranc joint tenderness    Independent visualization of the below image:  No results found for this or any previous visit (from the past 24 hour(s)).  XR LEFT ANKLE THREE OR MORE VIEWS   7/22/2019 10:28 PM      INDICATION: Persistent lateral pain after fall 1 week ago.     COMPARISON: 7/17/2019.                                                                      IMPRESSION: No acute fracture or dislocation. The lateral soft tissue  swelling seen previously has improved.     ANA M STEWART MD    Patient's conditions were thoroughly discussed during today's visit with greater than 50% of the visit spent counseling the patient with total time spent face-to-face with the patient being 30 minutes.    Erlin Farris MD [facility]     [FreeTextEntry1] : I SPENT 31   MIN ON THIS PATIENT CHART INCLUDING PREPARATION, PATIENT VISIT( HISTORY TAKING, EXAMINATION, AND DISCUSSION OF PLAN) AND NOTE COMPLETION.

## 2024-06-30 ENCOUNTER — HEALTH MAINTENANCE LETTER (OUTPATIENT)
Age: 29
End: 2024-06-30

## 2024-08-10 DIAGNOSIS — F51.04 CHRONIC INSOMNIA: ICD-10-CM

## 2024-08-13 RX ORDER — TRAZODONE HYDROCHLORIDE 50 MG/1
TABLET, FILM COATED ORAL
Qty: 90 TABLET | Refills: 0 | Status: SHIPPED | OUTPATIENT
Start: 2024-08-13

## 2025-06-06 NOTE — ED PROVIDER NOTES
History     Chief Complaint   Patient presents with     Thumb Discomfort     HPI  Blanche Lyman is a 23 year old female who presents with a left thumb injury.  Patient states that 4 days ago she was putting in a dock and her thumb got smashed between 2 sections of the dock.  Patient states that she has difficulty with movement and pain with movement.  Patient states she applied ice immediately and that was somewhat helpful.  She has taken ibuprofen 200 mg once and 400 mg once with minimal relief.  Patient states she is concerned about possible fracture.  Patient reports feeling well otherwise and denies any other concerns.    Problem List:    There are no active problems to display for this patient.       Past Medical History:    No past medical history on file.    Past Surgical History:    No past surgical history on file.    Family History:    Family History   Problem Relation Age of Onset     HEART DISEASE Maternal Grandfather      CANCER Paternal Grandfather      HEART DISEASE Paternal Grandfather        Social History:  Marital Status:  Single [1]  Social History   Substance Use Topics     Smoking status: Never Smoker     Smokeless tobacco: Never Used     Alcohol use Not on file        Medications:      levonorgestrel-ethinyl estradiol (AVIANE,ALESSE,LESSINA) 0.1-20 MG-MCG per tablet         Review of Systems   Constitutional: Negative for chills, fatigue and fever.   HENT: Negative for ear pain and sore throat.    Eyes: Negative for pain.   Respiratory: Negative for cough and shortness of breath.    Cardiovascular: Negative for chest pain.   Gastrointestinal: Negative for abdominal pain, constipation, diarrhea, nausea and vomiting.   Genitourinary: Negative for difficulty urinating and dysuria.   Skin: Positive for wound.   Neurological: Negative for seizures.   All other systems reviewed and are negative.      Physical Exam   BP: (!) 154/102  Pulse: 84  Temp: 98.8  F (37.1  C)  Weight: 73.9 kg (163  lb)  SpO2: 100 %      Physical Exam   Constitutional: She appears well-developed and well-nourished. No distress.   HENT:   Head: Normocephalic and atraumatic.   Eyes: Conjunctivae are normal. Right eye exhibits no discharge. Left eye exhibits no discharge.   Cardiovascular: Normal rate and regular rhythm.  Exam reveals no gallop and no friction rub.    No murmur heard.  Pulmonary/Chest: Effort normal and breath sounds normal. No respiratory distress. She has no wheezes. She has no rales. She exhibits no tenderness.   Neurological: She is alert.   Skin: Skin is warm. Bruising noted. She is not diaphoretic.        Psychiatric: She has a normal mood and affect.   Nursing note and vitals reviewed.      ED Course     ED Course     Procedures    Results for orders placed or performed during the hospital encounter of 05/27/18 (from the past 24 hour(s))   Fingers XR, 2-3 views, left    Narrative    LEFT FINGER TWO OR MORE VIEWS   5/27/2018 1:13 PM     HISTORY: Smashed thumb 4 days ago.     COMPARISON: None.      Impression    IMPRESSION: Normal.       Medications - No data to display    Assessments & Plan (with Medical Decision Making)     I have reviewed the nursing notes.    I have reviewed the findings, diagnosis, plan and need for follow up with the patient.  Blanche Lyman is a 23 year old female who presents with a left thumb injury.  Patient states that 4 days ago she was putting in a dock and her thumb got smashed between 2 sections of the dock.  Patient states that she has difficulty with movement and pain with movement.  Patient states she applied ice immediately and that was somewhat helpful.  She has taken ibuprofen 200 mg once and 400 mg once with minimal relief.  Patient states she is concerned about possible fracture.  Patient reports feeling well otherwise and denies any other concerns.  X-ray completed to rule out fracture versus contusion.  Based upon exam no evidence of tendon damage.  X-ray is negative  Grossly greater then 3/5 strength throughout. for fracture.  Explained findings to patient and explained care and treatment for contusion.  Splint applied for comfort.  Explained Tylenol and/or ibuprofen as needed for pain management.  Patient verbalizes understanding denies any questions.    New Prescriptions    No medications on file       Final diagnoses:   Contusion of left thumb with damage to nail, initial encounter       5/27/2018   [facility] EMERGENCY DEPARTMENT     Leida Falcon, MARIO CNP  05/27/18 1766

## 2025-07-13 ENCOUNTER — HEALTH MAINTENANCE LETTER (OUTPATIENT)
Age: 30
End: 2025-07-13